# Patient Record
Sex: MALE | Race: WHITE | Employment: OTHER | URBAN - METROPOLITAN AREA
[De-identification: names, ages, dates, MRNs, and addresses within clinical notes are randomized per-mention and may not be internally consistent; named-entity substitution may affect disease eponyms.]

---

## 2022-10-13 ENCOUNTER — APPOINTMENT (OUTPATIENT)
Dept: GENERAL RADIOLOGY | Age: 62
DRG: 988 | End: 2022-10-13
Payer: COMMERCIAL

## 2022-10-13 ENCOUNTER — APPOINTMENT (OUTPATIENT)
Dept: CT IMAGING | Age: 62
DRG: 988 | End: 2022-10-13
Payer: COMMERCIAL

## 2022-10-13 ENCOUNTER — ANESTHESIA EVENT (OUTPATIENT)
Dept: OPERATING ROOM | Age: 62
DRG: 988 | End: 2022-10-13
Payer: COMMERCIAL

## 2022-10-13 ENCOUNTER — HOSPITAL ENCOUNTER (INPATIENT)
Age: 62
LOS: 3 days | Discharge: HOME | DRG: 988 | End: 2022-10-16
Attending: EMERGENCY MEDICINE | Admitting: STUDENT IN AN ORGANIZED HEALTH CARE EDUCATION/TRAINING PROGRAM
Payer: COMMERCIAL

## 2022-10-13 DIAGNOSIS — F10.10 ALCOHOL ABUSE: ICD-10-CM

## 2022-10-13 DIAGNOSIS — R55 SYNCOPE, UNSPECIFIED SYNCOPE TYPE: Primary | ICD-10-CM

## 2022-10-13 DIAGNOSIS — R29.6 FREQUENT FALLS: ICD-10-CM

## 2022-10-13 DIAGNOSIS — T79.6XXA TRAUMATIC RHABDOMYOLYSIS, INITIAL ENCOUNTER (HCC): ICD-10-CM

## 2022-10-13 DIAGNOSIS — S01.81XA FOREHEAD LACERATION, INITIAL ENCOUNTER: ICD-10-CM

## 2022-10-13 DIAGNOSIS — S01.311A COMPLEX LACERATION OF RIGHT EAR, INITIAL ENCOUNTER: ICD-10-CM

## 2022-10-13 DIAGNOSIS — R79.89 ELEVATED LFTS: ICD-10-CM

## 2022-10-13 DIAGNOSIS — I71.43 INFRARENAL ABDOMINAL AORTIC ANEURYSM (AAA) WITHOUT RUPTURE: ICD-10-CM

## 2022-10-13 LAB
A/G RATIO: 1.7 (ref 1.1–2.2)
ALBUMIN SERPL-MCNC: 4.5 G/DL (ref 3.4–5)
ALP BLD-CCNC: 83 U/L (ref 40–129)
ALT SERPL-CCNC: 86 U/L (ref 10–40)
AMMONIA: 31 UMOL/L (ref 16–60)
AMPHETAMINE SCREEN, URINE: NORMAL
ANION GAP SERPL CALCULATED.3IONS-SCNC: 17 MMOL/L (ref 3–16)
AST SERPL-CCNC: 244 U/L (ref 15–37)
BACTERIA: NORMAL /HPF
BARBITURATE SCREEN URINE: NORMAL
BASOPHILS ABSOLUTE: 0 K/UL (ref 0–0.2)
BASOPHILS RELATIVE PERCENT: 0.3 %
BENZODIAZEPINE SCREEN, URINE: NORMAL
BILIRUB SERPL-MCNC: 1.5 MG/DL (ref 0–1)
BILIRUB SERPL-MCNC: 1.8 MG/DL (ref 0–1)
BILIRUBIN DIRECT: 0.4 MG/DL (ref 0–0.3)
BILIRUBIN URINE: NEGATIVE
BILIRUBIN, INDIRECT: 1.1 MG/DL (ref 0–1)
BLOOD, URINE: ABNORMAL
BUN BLDV-MCNC: 10 MG/DL (ref 7–20)
CALCIUM SERPL-MCNC: 9.7 MG/DL (ref 8.3–10.6)
CANNABINOID SCREEN URINE: NORMAL
CHLORIDE BLD-SCNC: 95 MMOL/L (ref 99–110)
CLARITY: CLEAR
CO2: 22 MMOL/L (ref 21–32)
COCAINE METABOLITE SCREEN URINE: NORMAL
COLOR: YELLOW
COMMENT UA: NORMAL
CREAT SERPL-MCNC: 0.6 MG/DL (ref 0.8–1.3)
EOSINOPHILS ABSOLUTE: 0 K/UL (ref 0–0.6)
EOSINOPHILS RELATIVE PERCENT: 0.2 %
EPITHELIAL CELLS, UA: 0 /HPF (ref 0–5)
ETHANOL: NORMAL MG/DL (ref 0–0.08)
FENTANYL SCREEN, URINE: NORMAL
GFR AFRICAN AMERICAN: >60
GFR NON-AFRICAN AMERICAN: >60
GLUCOSE BLD-MCNC: 121 MG/DL (ref 70–99)
GLUCOSE URINE: NEGATIVE MG/DL
HCT VFR BLD CALC: 47.3 % (ref 40.5–52.5)
HEMOGLOBIN: 16.6 G/DL (ref 13.5–17.5)
HYALINE CASTS: 0 /LPF (ref 0–8)
KETONES, URINE: NEGATIVE MG/DL
LACTIC ACID: 4.2 MMOL/L (ref 0.4–2)
LEUKOCYTE ESTERASE, URINE: NEGATIVE
LYMPHOCYTES ABSOLUTE: 0.5 K/UL (ref 1–5.1)
LYMPHOCYTES RELATIVE PERCENT: 3.6 %
Lab: NORMAL
MAGNESIUM: 1.9 MG/DL (ref 1.8–2.4)
MCH RBC QN AUTO: 33.3 PG (ref 26–34)
MCHC RBC AUTO-ENTMCNC: 35.2 G/DL (ref 31–36)
MCV RBC AUTO: 94.5 FL (ref 80–100)
METHADONE SCREEN, URINE: NORMAL
MICROSCOPIC EXAMINATION: YES
MONOCYTES ABSOLUTE: 1.2 K/UL (ref 0–1.3)
MONOCYTES RELATIVE PERCENT: 9.5 %
NEUTROPHILS ABSOLUTE: 11.2 K/UL (ref 1.7–7.7)
NEUTROPHILS RELATIVE PERCENT: 86.4 %
NITRITE, URINE: NEGATIVE
OPIATE SCREEN URINE: NORMAL
OXYCODONE URINE: NORMAL
PDW BLD-RTO: 14 % (ref 12.4–15.4)
PH UA: 7
PH UA: 7 (ref 5–8)
PHENCYCLIDINE SCREEN URINE: NORMAL
PLATELET # BLD: 132 K/UL (ref 135–450)
PMV BLD AUTO: 7.3 FL (ref 5–10.5)
POTASSIUM REFLEX MAGNESIUM: 4.2 MMOL/L (ref 3.5–5.1)
PRO-BNP: 202 PG/ML (ref 0–124)
PROTEIN UA: NEGATIVE MG/DL
RBC # BLD: 5 M/UL (ref 4.2–5.9)
RBC UA: NORMAL /HPF (ref 0–4)
SODIUM BLD-SCNC: 134 MMOL/L (ref 136–145)
SPECIFIC GRAVITY UA: 1.01 (ref 1–1.03)
TOTAL CK: 6707 U/L (ref 39–308)
TOTAL PROTEIN: 7.2 G/DL (ref 6.4–8.2)
TROPONIN: <0.01 NG/ML
URINE REFLEX TO CULTURE: ABNORMAL
URINE TYPE: ABNORMAL
UROBILINOGEN, URINE: 1 E.U./DL
WBC # BLD: 13 K/UL (ref 4–11)
WBC UA: 0 /HPF (ref 0–5)

## 2022-10-13 PROCEDURE — 36415 COLL VENOUS BLD VENIPUNCTURE: CPT

## 2022-10-13 PROCEDURE — 99221 1ST HOSP IP/OBS SF/LOW 40: CPT | Performed by: OTOLARYNGOLOGY

## 2022-10-13 PROCEDURE — 83735 ASSAY OF MAGNESIUM: CPT

## 2022-10-13 PROCEDURE — 6360000004 HC RX CONTRAST MEDICATION: Performed by: PHYSICIAN ASSISTANT

## 2022-10-13 PROCEDURE — 93005 ELECTROCARDIOGRAM TRACING: CPT | Performed by: PHYSICIAN ASSISTANT

## 2022-10-13 PROCEDURE — 6360000002 HC RX W HCPCS: Performed by: PHYSICIAN ASSISTANT

## 2022-10-13 PROCEDURE — 70450 CT HEAD/BRAIN W/O DYE: CPT

## 2022-10-13 PROCEDURE — 80053 COMPREHEN METABOLIC PANEL: CPT

## 2022-10-13 PROCEDURE — 70486 CT MAXILLOFACIAL W/O DYE: CPT

## 2022-10-13 PROCEDURE — 82077 ASSAY SPEC XCP UR&BREATH IA: CPT

## 2022-10-13 PROCEDURE — 99285 EMERGENCY DEPT VISIT HI MDM: CPT

## 2022-10-13 PROCEDURE — 71045 X-RAY EXAM CHEST 1 VIEW: CPT

## 2022-10-13 PROCEDURE — 2580000003 HC RX 258: Performed by: STUDENT IN AN ORGANIZED HEALTH CARE EDUCATION/TRAINING PROGRAM

## 2022-10-13 PROCEDURE — 82140 ASSAY OF AMMONIA: CPT

## 2022-10-13 PROCEDURE — 90471 IMMUNIZATION ADMIN: CPT | Performed by: PHYSICIAN ASSISTANT

## 2022-10-13 PROCEDURE — 82550 ASSAY OF CK (CPK): CPT

## 2022-10-13 PROCEDURE — 90714 TD VACC NO PRESV 7 YRS+ IM: CPT | Performed by: PHYSICIAN ASSISTANT

## 2022-10-13 PROCEDURE — 96360 HYDRATION IV INFUSION INIT: CPT

## 2022-10-13 PROCEDURE — 72125 CT NECK SPINE W/O DYE: CPT

## 2022-10-13 PROCEDURE — 2580000003 HC RX 258: Performed by: INTERNAL MEDICINE

## 2022-10-13 PROCEDURE — 71260 CT THORAX DX C+: CPT

## 2022-10-13 PROCEDURE — 83605 ASSAY OF LACTIC ACID: CPT

## 2022-10-13 PROCEDURE — 1200000000 HC SEMI PRIVATE

## 2022-10-13 PROCEDURE — 96365 THER/PROPH/DIAG IV INF INIT: CPT

## 2022-10-13 PROCEDURE — 96361 HYDRATE IV INFUSION ADD-ON: CPT

## 2022-10-13 PROCEDURE — 2580000003 HC RX 258: Performed by: EMERGENCY MEDICINE

## 2022-10-13 PROCEDURE — 85025 COMPLETE CBC W/AUTO DIFF WBC: CPT

## 2022-10-13 PROCEDURE — 82248 BILIRUBIN DIRECT: CPT

## 2022-10-13 PROCEDURE — 96375 TX/PRO/DX INJ NEW DRUG ADDON: CPT

## 2022-10-13 PROCEDURE — 84484 ASSAY OF TROPONIN QUANT: CPT

## 2022-10-13 PROCEDURE — 80307 DRUG TEST PRSMV CHEM ANLYZR: CPT

## 2022-10-13 PROCEDURE — 96366 THER/PROPH/DIAG IV INF ADDON: CPT

## 2022-10-13 PROCEDURE — 2500000003 HC RX 250 WO HCPCS: Performed by: STUDENT IN AN ORGANIZED HEALTH CARE EDUCATION/TRAINING PROGRAM

## 2022-10-13 PROCEDURE — 6370000000 HC RX 637 (ALT 250 FOR IP): Performed by: PHYSICIAN ASSISTANT

## 2022-10-13 PROCEDURE — 6360000002 HC RX W HCPCS: Performed by: INTERNAL MEDICINE

## 2022-10-13 PROCEDURE — 83880 ASSAY OF NATRIURETIC PEPTIDE: CPT

## 2022-10-13 PROCEDURE — 81001 URINALYSIS AUTO W/SCOPE: CPT

## 2022-10-13 PROCEDURE — 2580000003 HC RX 258: Performed by: PHYSICIAN ASSISTANT

## 2022-10-13 RX ORDER — 0.9 % SODIUM CHLORIDE 0.9 %
1000 INTRAVENOUS SOLUTION INTRAVENOUS ONCE
Status: COMPLETED | OUTPATIENT
Start: 2022-10-13 | End: 2022-10-13

## 2022-10-13 RX ORDER — POLYETHYLENE GLYCOL 3350 17 G/17G
17 POWDER, FOR SOLUTION ORAL DAILY PRN
Status: DISCONTINUED | OUTPATIENT
Start: 2022-10-13 | End: 2022-10-16 | Stop reason: HOSPADM

## 2022-10-13 RX ORDER — SODIUM CHLORIDE, SODIUM LACTATE, POTASSIUM CHLORIDE, CALCIUM CHLORIDE 600; 310; 30; 20 MG/100ML; MG/100ML; MG/100ML; MG/100ML
INJECTION, SOLUTION INTRAVENOUS CONTINUOUS
Status: DISCONTINUED | OUTPATIENT
Start: 2022-10-13 | End: 2022-10-16

## 2022-10-13 RX ORDER — SODIUM CHLORIDE 0.9 % (FLUSH) 0.9 %
5-40 SYRINGE (ML) INJECTION PRN
Status: DISCONTINUED | OUTPATIENT
Start: 2022-10-13 | End: 2022-10-16 | Stop reason: HOSPADM

## 2022-10-13 RX ORDER — NICOTINE 21 MG/24HR
1 PATCH, TRANSDERMAL 24 HOURS TRANSDERMAL DAILY
Status: DISCONTINUED | OUTPATIENT
Start: 2022-10-13 | End: 2022-10-16 | Stop reason: HOSPADM

## 2022-10-13 RX ORDER — LORAZEPAM 2 MG/ML
1 INJECTION INTRAMUSCULAR ONCE
Status: COMPLETED | OUTPATIENT
Start: 2022-10-13 | End: 2022-10-13

## 2022-10-13 RX ORDER — SODIUM CHLORIDE 9 MG/ML
30 INJECTION, SOLUTION INTRAVENOUS ONCE
Status: COMPLETED | OUTPATIENT
Start: 2022-10-13 | End: 2022-10-13

## 2022-10-13 RX ORDER — METRONIDAZOLE 500 MG/100ML
500 INJECTION, SOLUTION INTRAVENOUS EVERY 8 HOURS
Status: DISCONTINUED | OUTPATIENT
Start: 2022-10-13 | End: 2022-10-16 | Stop reason: HOSPADM

## 2022-10-13 RX ORDER — 0.9 % SODIUM CHLORIDE 0.9 %
1000 INTRAVENOUS SOLUTION INTRAVENOUS ONCE
Status: DISCONTINUED | OUTPATIENT
Start: 2022-10-13 | End: 2022-10-13

## 2022-10-13 RX ORDER — SODIUM CHLORIDE 9 MG/ML
INJECTION, SOLUTION INTRAVENOUS PRN
Status: DISCONTINUED | OUTPATIENT
Start: 2022-10-13 | End: 2022-10-16 | Stop reason: HOSPADM

## 2022-10-13 RX ORDER — ACETAMINOPHEN 650 MG/1
650 SUPPOSITORY RECTAL EVERY 6 HOURS PRN
Status: DISCONTINUED | OUTPATIENT
Start: 2022-10-13 | End: 2022-10-16 | Stop reason: HOSPADM

## 2022-10-13 RX ORDER — BACITRACIN, NEOMYCIN, POLYMYXIN B 400; 3.5; 5 [USP'U]/G; MG/G; [USP'U]/G
OINTMENT TOPICAL ONCE
Status: COMPLETED | OUTPATIENT
Start: 2022-10-13 | End: 2022-10-13

## 2022-10-13 RX ORDER — GAUZE BANDAGE 2" X 2"
100 BANDAGE TOPICAL DAILY
Status: DISCONTINUED | OUTPATIENT
Start: 2022-10-13 | End: 2022-10-16 | Stop reason: HOSPADM

## 2022-10-13 RX ORDER — ONDANSETRON 4 MG/1
4 TABLET, ORALLY DISINTEGRATING ORAL EVERY 8 HOURS PRN
Status: DISCONTINUED | OUTPATIENT
Start: 2022-10-13 | End: 2022-10-14

## 2022-10-13 RX ORDER — ACETAMINOPHEN 325 MG/1
650 TABLET ORAL EVERY 6 HOURS PRN
Status: DISCONTINUED | OUTPATIENT
Start: 2022-10-13 | End: 2022-10-16 | Stop reason: HOSPADM

## 2022-10-13 RX ORDER — SODIUM CHLORIDE 0.9 % (FLUSH) 0.9 %
5-40 SYRINGE (ML) INJECTION EVERY 12 HOURS SCHEDULED
Status: DISCONTINUED | OUTPATIENT
Start: 2022-10-13 | End: 2022-10-16 | Stop reason: HOSPADM

## 2022-10-13 RX ORDER — ONDANSETRON 2 MG/ML
4 INJECTION INTRAMUSCULAR; INTRAVENOUS EVERY 6 HOURS PRN
Status: DISCONTINUED | OUTPATIENT
Start: 2022-10-13 | End: 2022-10-14

## 2022-10-13 RX ADMIN — SODIUM CHLORIDE 1000 ML: 9 INJECTION, SOLUTION INTRAVENOUS at 13:40

## 2022-10-13 RX ADMIN — CEFEPIME 2000 MG: 2 INJECTION, POWDER, FOR SOLUTION INTRAVENOUS at 15:30

## 2022-10-13 RX ADMIN — IOPAMIDOL 75 ML: 755 INJECTION, SOLUTION INTRAVENOUS at 14:15

## 2022-10-13 RX ADMIN — METRONIDAZOLE 500 MG: 500 INJECTION, SOLUTION INTRAVENOUS at 22:01

## 2022-10-13 RX ADMIN — Medication 100 MG: at 15:40

## 2022-10-13 RX ADMIN — CLOSTRIDIUM TETANI TOXOID ANTIGEN (FORMALDEHYDE INACTIVATED) AND CORYNEBACTERIUM DIPHTHERIAE TOXOID ANTIGEN (FORMALDEHYDE INACTIVATED) 0.5 ML: 5; 2 INJECTION, SUSPENSION INTRAMUSCULAR at 16:56

## 2022-10-13 RX ADMIN — LORAZEPAM 1 MG: 2 INJECTION INTRAMUSCULAR; INTRAVENOUS at 15:42

## 2022-10-13 RX ADMIN — SODIUM CHLORIDE 30 ML/KG/HR: 9 INJECTION, SOLUTION INTRAVENOUS at 15:47

## 2022-10-13 RX ADMIN — VANCOMYCIN HYDROCHLORIDE 1250 MG: 10 INJECTION, POWDER, LYOPHILIZED, FOR SOLUTION INTRAVENOUS at 23:03

## 2022-10-13 RX ADMIN — BACITRACIN ZINC, NEOMYCIN, POLYMYXIN B SULFAT: 5000; 3.5; 4 OINTMENT TOPICAL at 15:45

## 2022-10-13 RX ADMIN — SODIUM CHLORIDE, POTASSIUM CHLORIDE, SODIUM LACTATE AND CALCIUM CHLORIDE: 600; 310; 30; 20 INJECTION, SOLUTION INTRAVENOUS at 21:49

## 2022-10-13 RX ADMIN — SODIUM CHLORIDE 1000 ML: 9 INJECTION, SOLUTION INTRAVENOUS at 19:06

## 2022-10-13 ASSESSMENT — PAIN SCALES - GENERAL
PAINLEVEL_OUTOF10: 0

## 2022-10-13 ASSESSMENT — PAIN - FUNCTIONAL ASSESSMENT: PAIN_FUNCTIONAL_ASSESSMENT: 0-10

## 2022-10-13 NOTE — PROGRESS NOTES
Medication Reconciliation    List of medications patient is currently taking is complete. Source of information: 1. Conversation with patient at bedside                                      2. EPIC records      Allergies  Patient has no known allergies. Notes regarding home medications:   1.  Patient reports no regular prescription/OTC/herbal medications      Ishmael Gunn [facility]   10/13/2022  1:43 PM

## 2022-10-13 NOTE — CONSULTS
[facility]      Patient Name: Nanda Faulkner  Medical Record Number:  [id number]  Primary Care Physician:  Pcp Gladys  Date of Consultation: 10/13/2022    Chief Complaint: Facial trauma        HISTORY OF PRESENT ILLNESS  Lesa Rodrigues is a(n) 60 y.o. male who presents for evaluation of facial trauma. The patient says that he actually fell in the shower on Tuesday. He struck the right side of his face and had quite a bit of bleeding. He said that he does not have a good reason for why he waited until now to present to the emergency room. Minimal pain. Other than the obvious laceration of the right brow and right ear he does not think he hurt anything else. No changes in hearing. No changes in vision. No changes in occlusion. REVIEW OF SYSTEMS  As above    PHYSICAL EXAM  GENERAL: No Acute Distress, Alert and Oriented, no Hoarseness, strong voice  EYES: EOMI, Anti-icteric  HENT:   Head: Normocephalic and atraumatic. Face: Large deep laceration down to bone of the right frontal region extending through the right brow and into the right eyelid. I do not see any orbital fat. It does not seem to be involving the globe. He still able to raise the eyebrow and close his eye completely   Right Ear: Macerated right ear with exposed cartilage, small amount of purulent and serous fluid draining from the wound  Left Ear: Normal external ear, normal external auditory canal  Mouth/Oral Cavity:  normal lips, Uvula is midline, no mucosal lesions, no trismus  Oropharynx/Larynx:  normal oropharynx,  Nose:Normal external nasal appearance. NECK: Normal range of motion, no thyromegaly, trachea is midline, no lymphadenopathy, no neck masses, no crepitus      RADIOLOGY  Summary of findings:  I reviewed the CT scan of the facial bones.   I see no obvious evidence of a fracture            ASSESSMENT/PLAN  Right ear and right facial laceration-patient has a fairly severe right facial and right ear laceration. Ordinarily we try to wash this out and close them as soon as possible, but has already been 48 hours. I think that admitting him for IV antibiotics for 24 hours prior to washout and closure would be best.  I discussed the risk of the closure of the ear and the brow. I think that it is fairly high risk for infection given the delayed  closure. Also told him I think there is going to be a significant cosmetic defect of the right ear as there appears to be dead tissue. I do still think that washing it out and closing it the best we can as ideal for the healing process. Patient agrees with the plan.    -N.p.o. at midnight  -Recommend broad-spectrum IV antibiotics with vancomycin and Zosyn.  -Bacitracin to open wounds. I have performed a head and neck physical exam personally or was physically present during the key or critical portions of the service. This note was generated completely or in part utilizing Dragon dictation speech recognition software. Occasionally, words are mistranscribed and despite editing, the text may contain inaccuracies due to incorrect word recognition. If further clarification is needed please contact the office at [phone].

## 2022-10-13 NOTE — ED PROVIDER NOTES
[address]      Pt Name: Abigail Ramsey  MRN: [id number]  [place] 1960  Date of evaluation: 10/13/2022  Provider: Oh AmayaWalls , [address] [place] [address]       Chief Complaint   Patient presents with    Fall     Pt felt dizzy in shower and fell the right ear is cut in half and dangling x2 days ago. The right eyebrow is lacerated. Pt states they are drinking before bed to help them sleep. 7-8 drinks. Denies pain. States they are light headed. Pt thinks they lost consciousness. Ear Laceration     Pt fell in shower and ear is cut open. Ear looks necrotic. Facial Laceration     Pt fell in shower and cut eyebrow open. HISTORY OF PRESENT ILLNESS   (Location/Symptom, Timing/Onset, Context/Setting, Quality, Duration, Modifying Factors, Severity)  Note limiting factors. Abigail Ramsey is a 60 y.o. male who presents to the emergency department with a complaint of blackout spells. The patient states that he has been having fainting episodes in which she wakes up on the ground over the last several weeks. He states that this last occurred 2 days ago on Tuesday. He states that he was in the bathtub and awakened and found that he had cut his right eyebrow and right ear. He does not remember feeling dizzy or lightheaded before he passed out. He denies any preceding dizziness, vision change, speech change, chest pain, shortness of breath, palpitations. He contacted his sister today and advised her that he needed to go to the hospital because of the laceration to his right ear. He is unsure of his last tetanus immunization. He denies any headache or neck pain. He denies any chest pain heaviness pressure shortness of breath diaphoresis or dyspnea on exertion. He denies any palpitations or arrhythmia. He denies any prior history of syncope. He denies any history of seizures. He does drink alcohol, 7 or 8 drinks daily, mostly in the evening.   He states that it helps him sleep. He last drank yesterday evening at 11 PM.  He denies any history of alcohol withdrawal.  He denies any associated alcoholic seizures. He denies any associated incontinence. He does complain of some bruising to his upper back from the fall. He denies any recent fever, chills, cough or cold symptoms. He denies any recent nausea vomiting or diarrhea. He denies any melena hematochezia. He denies any abdominal pain dysuria hematuria frequency urgency. He denies any history of drug use. He denies any history of hypertension, hyperlipidemia, diabetes, coronary artery disease, thromboembolic disease, arrhythmia, or cardiomyopathy. He smokes tobacco.    Nursing Notes were reviewed. HPI        REVIEW OF SYSTEMS    (2-9 systems for level 4, 10 or more for level 5)       Constitutional: Negative for fever or chills. HENT: Negative for rhinorrhea and sore throat. Eyes: Negative for redness or drainage. Respiratory: Negative for shortness of breath or dyspnea on exertion. Cardiovascular: Negative for chest pain. Gastrointestinal: Negative for abdominal pain. Negative for vomiting or diarrhea. Genitourinary: Negative for flank pain. Negative for dysuria. Negative for hematuria. Neurological: Negative for headache. Musculoskeletal:  Negative edema. Hematological: Negative for adenopathy. All systems are reviewed and are negative except for those listed above in the history of present illness and ROS. PAST MEDICAL HISTORY   History reviewed. No pertinent past medical history. SURGICAL HISTORY     History reviewed. No pertinent surgical history. CURRENT MEDICATIONS       There are no discharge medications for this patient. ALLERGIES     Patient has no known allergies. FAMILY HISTORY     History reviewed. No pertinent family history.        SOCIAL HISTORY       Social History     Socioeconomic History    Marital status: Single     Spouse name: None    Number of children: None    Years of education: None    Highest education level: None   Tobacco Use    Smoking status: Every Day     Types: Cigarettes    Smokeless tobacco: Never   Vaping Use    Vaping Use: Never used   Substance and Sexual Activity    Alcohol use: Yes     Alcohol/week: 77.0 standard drinks     Types: 42 Cans of beer, 35 Shots of liquor per week    Drug use: Never    Sexual activity: Not Currently       SCREENINGS    [place] Coma Scale  Eye Opening: Spontaneous  Best Verbal Response: Oriented  Best Motor Response: Obeys commands  [place] Coma Scale Score: 15        PHYSICAL EXAM    (up to 7 for level 4, 8 or more for level 5)     ED Triage Vitals [10/13/22 1118]   BP Temp Temp Source Heart Rate Resp SpO2 Height Weight   (!) 146/81 98.4 °F (36.9 °C) Oral (!) 127 18 97 % 5' 8\" (1.727 m) 181 lb 3.5 oz (82.2 kg)         Physical Exam   Constitutional: Awake and alert. Head: There is a large oblique laceration to the right mid eyebrow that extends onto the forehead. No orbital involvement. Normocephalic. No hemotympanum. No niño sign. Eyes: Pupils equal and reactive. Pupils 3 mm and reactive bilaterally. Extraocular muscles were intact. No entrapment. No impairment of gaze. No hyphema. Mild bruising noted to the right upper lid. Lash margins were intact. No photophobia. Conjunctiva normal.    HENT: No mid facial bony tenderness. Mandible nontender forage motion. No malocclusion. Oral mucosa moist.  Airway patent. Pharynx without erythema. Nares were clear. No intraoral or intranasal injury. There was a large irregular laceration to the right ear that transected the pinna in the upper one third of the pinna. Cartilage was fully exposed. There was a flap of skin from the rim of the pinna that was hanging. This was thin and appeared to be nonviable. No evidence of infection or necrosis. No erythema or soft tissue swelling. Neck:  Soft and supple. Nontender.   No point or axial tenderness. No pain with range of motion. Heart: Sinus tachycardia on the monitor. No murmur. Lungs:  Clear to auscultation. No wheezes, rales, or ronchi. No conversational dyspnea or accessory muscle use. Chest: Chest wall non-tender. No evidence of trauma. Abdomen:  Soft, nondistended, bowel sounds present. Nontender. No guarding rigidity or rebound. No masses. Musculoskeletal: Thoracic and lumbar spine were nontender. No point tenderness or step-off. Pelvis stable and nontender. Some old appearing bruises noted to the upper back bilaterally, brownish-red in appearance. No bony tenderness or step-off. This was in the bilateral suprascapular area. Extremities non-tender with full range of motion. Radial and dorsalis pedis pulses were intact. No calf tenderness erythema or edema. Neurological: Alert and oriented x 3. GCS 15. No dysarthria. No aphasia. No pronator drift. Speech clear. Cranial nerves II-XII intact. No facial droop. No acute focal motor or sensory deficits. Able to lift all extremities off the bed without difficulty. There is a mild resting tremor in the upper and lower extremities. No agitation. Skin: Skin is warm and dry. No rash. Lymphatic:  No lympadenopathy. Psychiatric: Normal mood and affect. Behavior is normal.  No hallucinations. He denies any suicidal or homicidal ideations. No auditory or visual hallucinations. No agitation. DIAGNOSTIC RESULTS     EKG: All EKG's are interpreted by the Emergency Department Physician who either signs or Co-signs this chart in the absence of a cardiologist.    Patrick tachycardia. Rate 105. WI interval 132 ms. QRS duration 126 ms. QTc 510 ms. R axis -51 degrees. Right bundle branch block. No ST elevation.     RADIOLOGY:   Non-plain film images such as CT, Ultrasound and MRI are read by the radiologist. Plain radiographic images are visualized and preliminarily interpreted by the emergency physician with the below findings:        Interpretation per the Radiologist below, if available at the time of this note:    CT CHEST ABDOMEN PELVIS W CONTRAST Additional Contrast? None   Final Result   No acute abnormalities identified in the chest, abdomen or pelvis. Fusiform aneurysmal dilation of the infrarenal abdominal aorta with eccentric   thrombus formation. The aneurysm measures approximately 3.7 cm in diameter. RECOMMENDATIONS:   For management of fusiform AAA:      3.5-3.9 cm AAA, recommend follow-up every 2 years. Note: Recommend Vascular consultation if a fusiform AAA enlarges by >0.5 cm   in 6 months or >1 cm in 1 year or for a saccular AAA of any size. References: Shamika Space Radiol 2013; 47(63):481-033; J Vasc Surg. 2018; 67:2-77         CT HEAD WO CONTRAST   Preliminary Result   No evidence of acute intracranial abnormality. CT CERVICAL SPINE WO CONTRAST   Final Result   No acute abnormality of the cervical spine. CT FACIAL BONES WO CONTRAST   Preliminary Result   1. No radiographic finding to suggest presence of acute facial bone fracture. 2. Findings suggestive of presence of multiple dental caries. Abnormal   lucency surrounding multiple teeth particularly the maxillary teeth may be on   the basis of underlying periodontal disease as described above. XR CHEST PORTABLE   Final Result   Minimal scarring/atelectatic changes seen in the lateral aspect of the left   lower lung field. No confluent airspace opacity seen.          XR ELBOW LEFT (MIN 3 VIEWS)    (Results Pending)   XR ELBOW RIGHT (MIN 3 VIEWS)    (Results Pending)         ED BEDSIDE ULTRASOUND:   Performed by ED Physician - none    LABS:  Labs Reviewed   CBC WITH AUTO DIFFERENTIAL - Abnormal; Notable for the following components:       Result Value    WBC 13.0 (*)     Platelets 188 (*)     Neutrophils Absolute 11.2 (*)     Lymphocytes Absolute 0.5 (*)     All other components within normal limits   COMPREHENSIVE METABOLIC PANEL W/ REFLEX TO MG FOR LOW K - Abnormal; Notable for the following components:    Sodium 134 (*)     Chloride 95 (*)     Anion Gap 17 (*)     Glucose 121 (*)     Creatinine 0.6 (*)     Total Bilirubin 1.8 (*)     ALT 86 (*)      (*)     All other components within normal limits   URINALYSIS WITH REFLEX TO CULTURE - Abnormal; Notable for the following components:    Blood, Urine SMALL (*)     All other components within normal limits   LACTIC ACID - Abnormal; Notable for the following components:    Lactic Acid 4.2 (*)     All other components within normal limits    Narrative:     Ashok Mena tel. [phone],  Chemistry results called to and read back by Eri Polanco RN, 10/13/2022  13:20, by [address] [place] [address] - Abnormal; Notable for the following components:    Pro- (*)     All other components within normal limits   BILIRUBIN TOTAL DIRECT & INDIRECT - Abnormal; Notable for the following components: Total Bilirubin 1.5 (*)     Bilirubin, Direct 0.4 (*)     Bilirubin, Indirect 1.1 (*)     All other components within normal limits   CK - Abnormal; Notable for the following components: Total CK 6,707 (*)     All other components within normal limits   TROPONIN   ETHANOL   URINE DRUG SCREEN   AMMONIA   MAGNESIUM   MICROSCOPIC URINALYSIS   BASIC METABOLIC PANEL W/ REFLEX TO MG FOR LOW K   CBC WITH AUTO DIFFERENTIAL   CK       All other labs were within normal range or not returned as of this dictation.     EMERGENCY DEPARTMENT COURSE and DIFFERENTIAL DIAGNOSIS/MDM:   Vitals:    Vitals:    10/13/22 2030 10/13/22 2100 10/13/22 2104 10/13/22 2122   BP: 129/84 115/72  129/79   Pulse: (!) 104 100 93 99   Resp: 20 27 23 18   Temp:    98.4 °F (36.9 °C)   TempSrc:    Oral   SpO2:   99% 96%   Weight:       Height:             MDM        The patient presents to the emergency department with a complaint of blackout spells that have been occurring multiple times over the last few weeks. The last occurred on Tuesday. Patient has no recall of falling and no preceding presyncopal symptoms. Differential diagnosis would include seizure, syncope, simple fainting spell, alcohol intoxication. It appears that he does drink heavily. The patient does have a mild alcohol withdrawal.  He is mildly tachycardic with a heart rate of 115 and there is a mild resting tremor. However, there is no agitation or hallucinations. He was given Ativan 1 mg IV. The patient has large irregular laceration to the right ear that transects the pinna with full exposure of the cartilage. Wound care was provided. Wound was cleansed with chlorhexidine and irrigated with normal saline. Sterile dressing was applied. There is also a laceration to the right eyebrow as noted above. Tetanus immunization was updated. The patient was given cefepime 2 g IV. Please refer to the photographs documented in the physician assistant's chart regarding lacerations to the right ear and right eyebrow. Care was discussed by the physician assistant with Dr. Abdelrahman Navarro who is on-call for ENT. Patient's wounds are now greater than 50 hours old. They were unable to be repaired here in the emergency department. The patient may require surgical debridement. Further management per Dr. Abdelrahman Navarro. REASSESSMENT          CT head, cervical spine, and maxillofacial are negative. CT chest abdomen pelvis was added. White blood cell count is 13.0. Glucose 121. Bicarb 22. Creatinine 0.6. Troponin is normal.  Serum alcohol is 0. Lactate is elevated at 4.2. Patient is afebrile. Clinical suspicion for sepsis is very low. Fluid bolus of normal saline 30 mL/kg was given. He will also be placed on CIWA protocol. He will be admitted for further treatment and evaluation. A call was placed to the hospitalist on-call for admission. CK is elevated consistent with rhabdomyolysis.   Renal function is normal.  IV fluids will be continued. CRITICAL CARE TIME   Total Critical Care time was 20 minutes, excluding separately reportable procedures. There was a high probability of clinically significant/life threatening deterioration in the patient's condition which required my urgent intervention. CONSULTS:  IP CONSULT TO OTOLARYNGOLOGY  PHARMACY TO DOSE VANCOMYCIN    PROCEDURES:  Unless otherwise noted below, none     Procedures        FINAL IMPRESSION      1. Syncope, unspecified syncope type    2. Complex laceration of right ear, initial encounter    3. Forehead laceration, initial encounter    4. Alcohol abuse    5. Elevated LFTs    6. Infrarenal abdominal aortic aneurysm (AAA) without rupture    7. Traumatic rhabdomyolysis, initial encounter [facility])          DISPOSITION/PLAN   DISPOSITION Admitted 10/13/2022 06:09:04 PM      PATIENT REFERRED TO:  No follow-up provider specified. DISCHARGE MEDICATIONS:  There are no discharge medications for this patient. Controlled Substances Monitoring:     No flowsheet data found. (Please note that portions of this note were completed with a voice recognition program.  Efforts were made to edit the dictations but occasionally words are mis-transcribed. )    1859 Jose Luis Werner DO (electronically signed)  Attending Emergency Physician           Marielos Ibrahim DO  10/13/22 6954

## 2022-10-13 NOTE — H&P
Hospital Medicine History & Physical      PCP: Pcp No    Date of Admission: 10/13/2022    Date of Service: 10/13/2022    Pt seen/examined on 10/13/2022    Admitted to Inpatient with expected LOS greater than two midnights due to medical therapy. Chief Complaint:     Chief Complaint   Patient presents with    Fall     Pt felt dizzy in shower and fell the right ear is cut in half and dangling x2 days ago. The right eyebrow is lacerated. Pt states they are drinking before bed to help them sleep. 7-8 drinks. Denies pain. States they are light headed. Pt thinks they lost consciousness. Ear Laceration     Pt fell in shower and ear is cut open. Ear looks necrotic. Facial Laceration     Pt fell in shower and cut eyebrow open. History Of Present Illness:      60 y.o. male with a history of multiple medical problems who presented to [facility] with falls. Has been drinking quite a bit of alcohol at night time. No hx of seizures. He's been passing out and falling down recently. Murl Rung in the showed 2 days ago and cut himself rather badly. Waited till now to come in to ED for evaluation. No other complaints. ENT consulted, will take to OR tomorrow to wash out right facial laceration and close. Past Medical History:      Reviewed and non-contributory except as noted below  No past medical history on file. Past Surgical History:      Reviewed and non-contributory except as noted below  No past surgical history on file. Medications Prior to Admission:      Reviewed and non-contributory except as noted below  Prior to Admission medications    Not on File       Allergies:     Reviewed and non-contributory except as noted below   Patient has no known allergies. Social History:      Reviewed and non-contributory except as noted below    TOBACCO:   has no history on file for tobacco use. ETOH:   has no history on file for alcohol use.     Family History:      Reviewed and non-contributory except as noted below    No family history on file. REVIEW OF SYSTEMS:   Pertinent positives and negatives as noted in the HPI. All other systems reviewed and negative. PHYSICAL EXAM PERFORMED:    /82   Pulse (!) 112   Temp 97.8 °F (36.6 °C) (Oral)   Resp 28   Ht 5' 8\" (1.727 m)   Wt 181 lb 3.5 oz (82.2 kg)   SpO2 97%   BMI 27.55 kg/m²     General appearance: No apparent distress, appears stated age and cooperative. HEENT: Pupils equal, round, and reactive to light. Conjunctivae/corneas clear. Face: Deep laceration in R frontal region. Macerated right ear with purulent drainage. Neck: Supple, with full range of motion. No jugular venous distention. Trachea midline. Respiratory:  Normal respiratory effort. Clear to auscultation, bilaterally without Rales/Wheezes/Rhonchi. Cardiovascular: Regular rate and rhythm with normal S1/S2 without murmurs, rubs or gallops. Abdomen: Soft, non-tender, non-distended with normal bowel sounds. Musculoskeletal: No clubbing, cyanosis or edema bilaterally. Full range of motion without deformity. Skin: Skin color, texture, turgor normal.  No rashes or lesions. Neurologic:  Neurovascularly intact without any focal sensory/motor deficits. Cranial nerves: II-XII intact, grossly non-focal.  Psychiatric: Alert and oriented, thought content appropriate, normal insight    Labs:     Recent Labs     10/13/22  1241   WBC 13.0*   HGB 16.6   HCT 47.3   *     Recent Labs     10/13/22  1241   *   K 4.2   CL 95*   CO2 22   BUN 10   CREATININE 0.6*   CALCIUM 9.7     Recent Labs     10/13/22  1241 10/13/22  1516   *  --    ALT 86*  --    BILIDIR  --  0.4*   BILITOT 1.8* 1.5*   ALKPHOS 83  --      No results for input(s): INR in the last 72 hours.   Recent Labs     10/13/22  1241 10/13/22  1516   CKTOTAL  --  6,707*   TROPONINI <0.01  --        Urinalysis:      Lab Results   Component Value Date/Time    NITRU Negative 10/13/2022 03:16 PM    WBCUA 0 10/13/2022 03:16 PM    BACTERIA None Seen 10/13/2022 03:16 PM    RBCUA 0-2 10/13/2022 03:16 PM    BLOODU SMALL 10/13/2022 03:16 PM    SPECGRAV 1.006 10/13/2022 03:16 PM    GLUCOSEU Negative 10/13/2022 03:16 PM       Radiology:     CT CHEST ABDOMEN PELVIS W CONTRAST Additional Contrast? None   Final Result   No acute abnormalities identified in the chest, abdomen or pelvis. Fusiform aneurysmal dilation of the infrarenal abdominal aorta with eccentric   thrombus formation. The aneurysm measures approximately 3.7 cm in diameter. RECOMMENDATIONS:   For management of fusiform AAA:      3.5-3.9 cm AAA, recommend follow-up every 2 years. Note: Recommend Vascular consultation if a fusiform AAA enlarges by >0.5 cm   in 6 months or >1 cm in 1 year or for a saccular AAA of any size. References: Sera Graves Radiol 2013; 54(72):655-376; J Vasc Surg. 2018; 67:2-77         CT HEAD WO CONTRAST   Preliminary Result   No evidence of acute intracranial abnormality. CT CERVICAL SPINE WO CONTRAST   Final Result   No acute abnormality of the cervical spine. CT FACIAL BONES WO CONTRAST   Preliminary Result   1. No radiographic finding to suggest presence of acute facial bone fracture. 2. Findings suggestive of presence of multiple dental caries. Abnormal   lucency surrounding multiple teeth particularly the maxillary teeth may be on   the basis of underlying periodontal disease as described above. XR CHEST PORTABLE   Final Result   Minimal scarring/atelectatic changes seen in the lateral aspect of the left   lower lung field. No confluent airspace opacity seen.          XR ELBOW LEFT (MIN 3 VIEWS)    (Results Pending)   XR ELBOW RIGHT (MIN 3 VIEWS)    (Results Pending)       ASSESSMENT:    Active Hospital Problems    Diagnosis Date Noted    Frequent falls [R29.6] 10/13/2022     Priority: Medium       PLAN:    Recurrent falls likely 2/2 Alcohol abuse  - CIWA  - Thiamine  - IVF    Facial lacerations  - ENT consulted, to OR tomorrow  - Recommend IV abx with vanc and zosyn. D/t rhabdo and risk for IQRA, will start with vanc, cefepime, and flagyl  - Bacitracin ointment    Rhabdomyolysis  - CK 6700 on admit  - Daily CK  - LR @ 150 cc/h    Transaminitis  - With mild bilirubinemia, likely 2/2 alcohol abuse  - INR in AM  - Daily CMP    Lactic acidosis  - 4.2 on arrival  - Thiamine administration  - IVF as above    The patient and / or the family were informed of the results of any tests, a time was given to answer questions, a plan was proposed and they agreed with plan. DVT Prophylaxis: SCDs  Diet: No diet orders on file  Code Status: No Order    PT/OT Eval Status: Deferred    Dispo: Qiana Crane pending clinical improvement      Geoff Stack MD   Internal Medicine  10/13/2022 6:09 PM  Reach via [employer]      Thank you Pcp No for the opportunity to be involved in this patient's care. If you have any questions or concerns please feel free to contact me via [address] [place] [address] Avenue.

## 2022-10-13 NOTE — ED PROVIDER NOTES
[facility]        Pt Name: Sarah Mccoy  MRN: [id number]  [place] 1960  Date of evaluation: 10/13/2022  Provider: FRED Llamas  PCP: Pcp No  Note Started: 11:35 AM EDT     This patient was also seen and evaluated by the attending physician Anita Wright, [address] [place] [address]       Chief Complaint   Patient presents with    Fall     Pt felt dizzy in shower and fell the right ear is cut in half and dangling x2 days ago. The right eyebrow is lacerated. Pt states they are drinking before bed to help them sleep. 7-8 drinks. Denies pain. States they are light headed. Pt thinks they lost consciousness. Ear Laceration     Pt fell in shower and ear is cut open. Ear looks necrotic. Facial Laceration     Pt fell in shower and cut eyebrow open. HISTORY OF PRESENT ILLNESS   (Location, Timing/Onset, Context/Setting, Quality, Duration, Modifying Factors, Severity, Associated Signs and Symptoms)  Note limiting factors. Sarah Mccoy is a 60 y.o. male who presents wearing a fall in the shower. He says it happened 2 days ago, and he thinks he passed out, not sure how long he was on the floor, and says he realizes he probably should have gotten this checked out sooner but does not have a good explanation for why he waited. He says he has a big cut on his right ear, and another on the forehead. Says has been having episodes of passing out and falling a lot lately at home. Lives alone. Admits to drinking a lot of alcohol, mostly at night. Takes no prescription medications. Denies any other complaints, including shortness of breath, abdominal pain, chest pain. Says he has some bruises elsewhere but no significant pain. Nursing Notes were all reviewed and agreed with or any disagreements were addressed in the HPI.     REVIEW OF SYSTEMS    (2-9 systems for level 4, 10 or more for level 5)     Positives and pertinent negatives as per HPI. PAST MEDICAL HISTORY   No past medical history on file. SURGICAL HISTORY   No past surgical history on file. CURRENTMEDICATIONS       Previous Medications    No medications on file       ALLERGIES     Patient has no known allergies. FAMILYHISTORY     No family history on file. SOCIAL HISTORY          SCREENINGS    Danika Coma Scale  Eye Opening: Spontaneous  Best Verbal Response: Oriented  Best Motor Response: Obeys commands  Danika Coma Scale Score: 15      PHYSICAL EXAM    (up to 7 for level 4, 8 or more for level 5)     ED Triage Vitals [10/13/22 1118]   BP Temp Temp Source Heart Rate Resp SpO2 Height Weight   (!) 146/81 98.4 °F (36.9 °C) Oral (!) 127 18 97 % 5' 8\" (1.727 m) 181 lb 3.5 oz (82.2 kg)       Physical Exam  Vitals and nursing note reviewed. Constitutional:       General: He is not in acute distress. Appearance: Normal appearance. He is not ill-appearing. HENT:      Head: Normocephalic and atraumatic. Comments: There is a laceration noted to the right periorbital area superior and lateral to the eye, through the eyebrow and upper eyelid, with clotted blood and early stages of healing. Negative for significant bony tenderness in this area. There was also a complex laceration to the right ear all the way through the helix and antihelix, not involving the tragus or canal, with broad exposure of cartilage and soft tissue. See images below. Negative for raccoon eyes or Sloan's sign. Negative for mastoid tenderness bilaterally. Nose: Nose normal.   Eyes:      General:         Right eye: No discharge. Left eye: No discharge. Extraocular Movements: Extraocular movements intact. Pupils: Pupils are equal, round, and reactive to light. Cardiovascular:      Rate and Rhythm: Tachycardia present. Pulmonary:      Effort: Pulmonary effort is normal. No respiratory distress. Musculoskeletal:         General: Normal range of motion. Cervical back: Normal range of motion. Comments: Patient has some bruising on his elbows over the olecranon, and some ecchymosis on the back in various stages of healing. No direct spinal tenderness. Skin:     General: Skin is warm and dry. Neurological:      General: No focal deficit present. Mental Status: He is alert and oriented to person, place, and time. Psychiatric:         Mood and Affect: Mood normal.         Behavior: Behavior normal.                 DIAGNOSTIC RESULTS   LABS:    Labs Reviewed   CBC WITH AUTO DIFFERENTIAL - Abnormal; Notable for the following components:       Result Value    WBC 13.0 (*)     Platelets 668 (*)     Neutrophils Absolute 11.2 (*)     Lymphocytes Absolute 0.5 (*)     All other components within normal limits   COMPREHENSIVE METABOLIC PANEL W/ REFLEX TO MG FOR LOW K - Abnormal; Notable for the following components:    Sodium 134 (*)     Chloride 95 (*)     Anion Gap 17 (*)     Glucose 121 (*)     Creatinine 0.6 (*)     Total Bilirubin 1.8 (*)     ALT 86 (*)      (*)     All other components within normal limits   URINALYSIS WITH REFLEX TO CULTURE - Abnormal; Notable for the following components:    Blood, Urine SMALL (*)     All other components within normal limits   LACTIC ACID - Abnormal; Notable for the following components:    Lactic Acid 4.2 (*)     All other components within normal limits    Narrative:     Fer Ortiz tel. [phone],  Chemistry results called to and read back by Erik Anton RN, 10/13/2022  13:20, by Olinda Brumfield - Abnormal; Notable for the following components:    Pro- (*)     All other components within normal limits   BILIRUBIN TOTAL DIRECT & INDIRECT - Abnormal; Notable for the following components:     Total Bilirubin 1.5 (*)     Bilirubin, Direct 0.4 (*)     Bilirubin, Indirect 1.1 (*)     All other components within normal limits   CK - Abnormal; Notable for the following components: Total CK 6,707 (*)     All other components within normal limits   TROPONIN   ETHANOL   URINE DRUG SCREEN   AMMONIA   MAGNESIUM   MICROSCOPIC URINALYSIS       When ordered only abnormal lab results are displayed. All other labs were within normal range or not returned as of this dictation. EKG: When ordered, EKG's are interpreted by the Emergency Department Physician in the absence of a cardiologist.  Please see their note for interpretation of EKG. RADIOLOGY:   All images such as plain radiographs, CT, Ultrasound and MRI are interpreted by a radiologist. Some images are visualized and preliminarily interpreted by me and/or the ED attending physician. Interpretation per the radiologist below, if available at the time of this note:    CT CHEST ABDOMEN PELVIS W CONTRAST Additional Contrast? None   Final Result   No acute abnormalities identified in the chest, abdomen or pelvis. Fusiform aneurysmal dilation of the infrarenal abdominal aorta with eccentric   thrombus formation. The aneurysm measures approximately 3.7 cm in diameter. RECOMMENDATIONS:   For management of fusiform AAA:      3.5-3.9 cm AAA, recommend follow-up every 2 years. Note: Recommend Vascular consultation if a fusiform AAA enlarges by >0.5 cm   in 6 months or >1 cm in 1 year or for a saccular AAA of any size. References: Richards Maite Radiol 2013; 53(80):716-706; J Vasc Surg. 2018; 67:2-77         CT HEAD WO CONTRAST   Preliminary Result   No evidence of acute intracranial abnormality. CT CERVICAL SPINE WO CONTRAST   Final Result   No acute abnormality of the cervical spine. CT FACIAL BONES WO CONTRAST   Preliminary Result   1. No radiographic finding to suggest presence of acute facial bone fracture. 2. Findings suggestive of presence of multiple dental caries.   Abnormal   lucency surrounding multiple teeth particularly the maxillary teeth may be on   the basis of underlying periodontal disease as described above. XR CHEST PORTABLE   Final Result   Minimal scarring/atelectatic changes seen in the lateral aspect of the left   lower lung field. No confluent airspace opacity seen. XR ELBOW LEFT (MIN 3 VIEWS)    (Results Pending)   XR ELBOW RIGHT (MIN 3 VIEWS)    (Results Pending)       CONSULTS:  [facility]   Unless otherwise noted below, none. Procedures    EMERGENCY DEPARTMENT COURSE and DIFFERENTIAL DIAGNOSIS/MDM:   Vitals:    Vitals:    10/13/22 1558 10/13/22 1621 10/13/22 1623 10/13/22 1700   BP: 137/88 137/88 (!) 146/81 129/82   Pulse: (!) 101  (!) 127 (!) 112   Resp: 29   28   Temp: 97.8 °F (36.6 °C)      TempSrc: Oral      SpO2: 98%   97%   Weight:       Height:           Patient was given the following medications:  Medications   thiamine mononitrate tablet 100 mg (100 mg Oral Given 10/13/22 1540)   nicotine (NICODERM CQ) 21 MG/24HR 1 patch (1 patch TransDERmal Patch Applied 10/13/22 1539)   neomycin-bacitracin-polymyxin (NEOSPORIN) ointment (has no administration in time range)   0.9 % sodium chloride bolus (has no administration in time range)   tetanus & diphtheria toxoids (adult) 5-2 LFU injection 0.5 mL (0.5 mLs IntraMUSCular Given 10/13/22 1656)   LORazepam (ATIVAN) injection 1 mg (1 mg IntraVENous Given 10/13/22 1542)   cefepime (MAXIPIME) 2000 mg IVPB minibag (0 mg IntraVENous Stopped 10/13/22 1705)   iopamidol (ISOVUE-370) 76 % injection 75 mL (75 mLs IntraVENous Given 10/13/22 1415)   0.9 % sodium chloride infusion (30 mL/kg/hr × 82.2 kg IntraVENous New Bag 10/13/22 1547)           Is this patient to be included in the SEP-1 Core Measure due to severe sepsis or septic shock? No   Exclusion criteria - the patient is NOT to be included for SEP-1 Core Measure due to:  May have criteria for sepsis, but does not meet criteria for severe sepsis or septic shock    See physical exam above. Patient's wounds are now about 3days old.   They were cleaned in the ED but not appropriate for suturing. I consulted the ENT physician on-call, Dr. Jesenia Yepez, who visited the patient in the ED, says he will likely take the patient to the OR tomorrow for liver cleaning and closure as possible. Patient is alcoholic, says last drink was last night, and he was tachycardic and had a tremor in the ER, but CIWA score was 5. No seizure activity. He was given IV Ativan and seizure precautions were placed. Laboratory work-up was notable for CK greater than 6700. Lactate greater than 4, normal kidney function. Had some LFT and bilirubin elevation. He was given IV fluids and antibiotics in the emergency department. CT scans of the head, cervical spine, facial bones, chest, abdomen and pelvis all showed no acute findings. There was an incidental AAA. I consulted the hospitalist, who agreed to accept and admit the patient. The patient verbalized understanding and agreement with this plan of care. CRITICAL CARE TIME   There was a high probability of clinically significant and/or life threatening deterioration in this patient's condition which required my urgent intervention. I independently provided 20 minutes of non-concurrent critical care out of the total shared critical care time provided. This excludes any time for separately reportable procedures. FINAL IMPRESSION      1. Syncope, unspecified syncope type    2. Complex laceration of right ear, initial encounter    3. Forehead laceration, initial encounter    4. Alcohol abuse    5. Elevated LFTs    6. Infrarenal abdominal aortic aneurysm (AAA) without rupture    7. Traumatic rhabdomyolysis, initial encounter [facility])          DISPOSITION/PLAN   DISPOSITION Decision To Admit 10/13/2022 04:21:52 PM      PATIENT REFERRED TO:  No follow-up provider specified.     DISCHARGE MEDICATIONS:  New Prescriptions    No medications on file       DISCONTINUED MEDICATIONS:  Discontinued Medications    No medications on file (Please note that portions of this note were completed with a voice recognition program.  Efforts were made to edit the dictations but occasionally words are mis-transcribed.)    FRED Henderson (electronically signed)        Yana Henderson  10/13/22 [phone]

## 2022-10-13 NOTE — ED NOTES
Pt. to be admitted to [place] [address] room 4281. Report called to Fransisca Kent RN who will in turn give report to night shift nurse. Transport will be initiated when room has been cleaned.       Kaila Nuñez RN  10/13/22 8807

## 2022-10-13 NOTE — ED NOTES
1st attempt to call report to 4N. No answer from nurse. Will attempt again.       Sienna Bob RN  10/13/22 8885

## 2022-10-14 ENCOUNTER — ANESTHESIA (OUTPATIENT)
Dept: OPERATING ROOM | Age: 62
DRG: 988 | End: 2022-10-14
Payer: COMMERCIAL
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
## 2022-10-14 LAB
ANION GAP SERPL CALCULATED.3IONS-SCNC: 10 MMOL/L (ref 3–16)
BASOPHILS ABSOLUTE: 0.1 K/UL (ref 0–0.2)
BASOPHILS RELATIVE PERCENT: 0.6 %
BUN BLDV-MCNC: 9 MG/DL (ref 7–20)
CALCIUM SERPL-MCNC: 8.4 MG/DL (ref 8.3–10.6)
CHLORIDE BLD-SCNC: 107 MMOL/L (ref 99–110)
CO2: 23 MMOL/L (ref 21–32)
CREAT SERPL-MCNC: 0.5 MG/DL (ref 0.8–1.3)
EKG ATRIAL RATE: 105 BPM
EKG DIAGNOSIS: NORMAL
EKG P AXIS: 45 DEGREES
EKG P-R INTERVAL: 132 MS
EKG Q-T INTERVAL: 386 MS
EKG QRS DURATION: 126 MS
EKG QTC CALCULATION (BAZETT): 510 MS
EKG R AXIS: -51 DEGREES
EKG T AXIS: 4 DEGREES
EKG VENTRICULAR RATE: 105 BPM
EOSINOPHILS ABSOLUTE: 0 K/UL (ref 0–0.6)
EOSINOPHILS RELATIVE PERCENT: 0.2 %
GFR AFRICAN AMERICAN: >60
GFR NON-AFRICAN AMERICAN: >60
GLUCOSE BLD-MCNC: 100 MG/DL (ref 70–99)
HCT VFR BLD CALC: 40.1 % (ref 40.5–52.5)
HEMATOLOGY PATH CONSULT: NO
HEMOGLOBIN: 14 G/DL (ref 13.5–17.5)
LYMPHOCYTES ABSOLUTE: 0.9 K/UL (ref 1–5.1)
LYMPHOCYTES RELATIVE PERCENT: 10.7 %
MCH RBC QN AUTO: 33.6 PG (ref 26–34)
MCHC RBC AUTO-ENTMCNC: 35 G/DL (ref 31–36)
MCV RBC AUTO: 95.9 FL (ref 80–100)
MONOCYTES ABSOLUTE: 0.8 K/UL (ref 0–1.3)
MONOCYTES RELATIVE PERCENT: 9.6 %
NEUTROPHILS ABSOLUTE: 6.9 K/UL (ref 1.7–7.7)
NEUTROPHILS RELATIVE PERCENT: 78.9 %
PDW BLD-RTO: 13.5 % (ref 12.4–15.4)
PLATELET # BLD: 98 K/UL (ref 135–450)
PLATELET SLIDE REVIEW: ABNORMAL
PMV BLD AUTO: 7.1 FL (ref 5–10.5)
POTASSIUM REFLEX MAGNESIUM: 3.7 MMOL/L (ref 3.5–5.1)
RBC # BLD: 4.18 M/UL (ref 4.2–5.9)
RBC # BLD: NORMAL 10*6/UL
SLIDE REVIEW: ABNORMAL
SODIUM BLD-SCNC: 140 MMOL/L (ref 136–145)
TOTAL CK: 4287 U/L (ref 39–308)
VANCOMYCIN RANDOM: 8.1 UG/ML
WBC # BLD: 8.7 K/UL (ref 4–11)

## 2022-10-14 PROCEDURE — 7100000000 HC PACU RECOVERY - FIRST 15 MIN: Performed by: OTOLARYNGOLOGY

## 2022-10-14 PROCEDURE — 1200000000 HC SEMI PRIVATE

## 2022-10-14 PROCEDURE — 36415 COLL VENOUS BLD VENIPUNCTURE: CPT

## 2022-10-14 PROCEDURE — 0KQ00ZZ REPAIR HEAD MUSCLE, OPEN APPROACH: ICD-10-PCS | Performed by: OTOLARYNGOLOGY

## 2022-10-14 PROCEDURE — 6370000000 HC RX 637 (ALT 250 FOR IP): Performed by: OTOLARYNGOLOGY

## 2022-10-14 PROCEDURE — 0HB1XZZ EXCISION OF FACE SKIN, EXTERNAL APPROACH: ICD-10-PCS | Performed by: OTOLARYNGOLOGY

## 2022-10-14 PROCEDURE — 82550 ASSAY OF CK (CPK): CPT

## 2022-10-14 PROCEDURE — 2500000003 HC RX 250 WO HCPCS: Performed by: STUDENT IN AN ORGANIZED HEALTH CARE EDUCATION/TRAINING PROGRAM

## 2022-10-14 PROCEDURE — 85025 COMPLETE CBC W/AUTO DIFF WBC: CPT

## 2022-10-14 PROCEDURE — A4217 STERILE WATER/SALINE, 500 ML: HCPCS | Performed by: OTOLARYNGOLOGY

## 2022-10-14 PROCEDURE — 6370000000 HC RX 637 (ALT 250 FOR IP): Performed by: NURSE PRACTITIONER

## 2022-10-14 PROCEDURE — 97161 PT EVAL LOW COMPLEX 20 MIN: CPT

## 2022-10-14 PROCEDURE — 97530 THERAPEUTIC ACTIVITIES: CPT

## 2022-10-14 PROCEDURE — 2500000003 HC RX 250 WO HCPCS

## 2022-10-14 PROCEDURE — 80202 ASSAY OF VANCOMYCIN: CPT

## 2022-10-14 PROCEDURE — 6360000002 HC RX W HCPCS: Performed by: NURSE PRACTITIONER

## 2022-10-14 PROCEDURE — 6360000002 HC RX W HCPCS

## 2022-10-14 PROCEDURE — 3600000003 HC SURGERY LEVEL 3 BASE: Performed by: OTOLARYNGOLOGY

## 2022-10-14 PROCEDURE — 80048 BASIC METABOLIC PNL TOTAL CA: CPT

## 2022-10-14 PROCEDURE — 97166 OT EVAL MOD COMPLEX 45 MIN: CPT

## 2022-10-14 PROCEDURE — 3600000013 HC SURGERY LEVEL 3 ADDTL 15MIN: Performed by: OTOLARYNGOLOGY

## 2022-10-14 PROCEDURE — 3700000001 HC ADD 15 MINUTES (ANESTHESIA): Performed by: OTOLARYNGOLOGY

## 2022-10-14 PROCEDURE — 2709999900 HC NON-CHARGEABLE SUPPLY: Performed by: OTOLARYNGOLOGY

## 2022-10-14 PROCEDURE — 13152 CMPLX RPR E/N/E/L 2.6-7.5 CM: CPT | Performed by: OTOLARYNGOLOGY

## 2022-10-14 PROCEDURE — 7100000001 HC PACU RECOVERY - ADDTL 15 MIN: Performed by: OTOLARYNGOLOGY

## 2022-10-14 PROCEDURE — 6370000000 HC RX 637 (ALT 250 FOR IP): Performed by: STUDENT IN AN ORGANIZED HEALTH CARE EDUCATION/TRAINING PROGRAM

## 2022-10-14 PROCEDURE — 13132 CMPLX RPR F/C/C/M/N/AX/G/H/F: CPT | Performed by: OTOLARYNGOLOGY

## 2022-10-14 PROCEDURE — 3700000000 HC ANESTHESIA ATTENDED CARE: Performed by: OTOLARYNGOLOGY

## 2022-10-14 PROCEDURE — 2580000003 HC RX 258: Performed by: STUDENT IN AN ORGANIZED HEALTH CARE EDUCATION/TRAINING PROGRAM

## 2022-10-14 PROCEDURE — 93010 ELECTROCARDIOGRAM REPORT: CPT | Performed by: INTERNAL MEDICINE

## 2022-10-14 PROCEDURE — 6360000002 HC RX W HCPCS: Performed by: FAMILY MEDICINE

## 2022-10-14 PROCEDURE — 6360000002 HC RX W HCPCS: Performed by: STUDENT IN AN ORGANIZED HEALTH CARE EDUCATION/TRAINING PROGRAM

## 2022-10-14 PROCEDURE — 2580000003 HC RX 258: Performed by: FAMILY MEDICINE

## 2022-10-14 PROCEDURE — 2580000003 HC RX 258

## 2022-10-14 PROCEDURE — 2580000003 HC RX 258: Performed by: OTOLARYNGOLOGY

## 2022-10-14 RX ORDER — PROMETHAZINE HYDROCHLORIDE 25 MG/ML
12.5 INJECTION, SOLUTION INTRAMUSCULAR; INTRAVENOUS EVERY 6 HOURS PRN
Status: DISCONTINUED | OUTPATIENT
Start: 2022-10-14 | End: 2022-10-16 | Stop reason: HOSPADM

## 2022-10-14 RX ORDER — SODIUM CHLORIDE 0.9 % (FLUSH) 0.9 %
5-40 SYRINGE (ML) INJECTION EVERY 12 HOURS SCHEDULED
Status: DISCONTINUED | OUTPATIENT
Start: 2022-10-14 | End: 2022-10-14

## 2022-10-14 RX ORDER — ACETAMINOPHEN 325 MG/1
650 TABLET ORAL EVERY 6 HOURS PRN
Status: DISCONTINUED | OUTPATIENT
Start: 2022-10-14 | End: 2022-10-14 | Stop reason: ALTCHOICE

## 2022-10-14 RX ORDER — LANOLIN ALCOHOL/MO/W.PET/CERES
6 CREAM (GRAM) TOPICAL NIGHTLY PRN
Status: DISCONTINUED | OUTPATIENT
Start: 2022-10-14 | End: 2022-10-15

## 2022-10-14 RX ORDER — PROPOFOL 10 MG/ML
INJECTION, EMULSION INTRAVENOUS PRN
Status: DISCONTINUED | OUTPATIENT
Start: 2022-10-14 | End: 2022-10-14 | Stop reason: SDUPTHER

## 2022-10-14 RX ORDER — LORAZEPAM 2 MG/ML
3 INJECTION INTRAMUSCULAR
Status: DISCONTINUED | OUTPATIENT
Start: 2022-10-14 | End: 2022-10-16 | Stop reason: HOSPADM

## 2022-10-14 RX ORDER — ONDANSETRON 2 MG/ML
4 INJECTION INTRAMUSCULAR; INTRAVENOUS EVERY 6 HOURS PRN
Status: DISCONTINUED | OUTPATIENT
Start: 2022-10-14 | End: 2022-10-14 | Stop reason: SDUPTHER

## 2022-10-14 RX ORDER — SODIUM CHLORIDE 9 MG/ML
INJECTION, SOLUTION INTRAVENOUS PRN
Status: DISCONTINUED | OUTPATIENT
Start: 2022-10-14 | End: 2022-10-16 | Stop reason: HOSPADM

## 2022-10-14 RX ORDER — DEXAMETHASONE SODIUM PHOSPHATE 4 MG/ML
INJECTION, SOLUTION INTRA-ARTICULAR; INTRALESIONAL; INTRAMUSCULAR; INTRAVENOUS; SOFT TISSUE PRN
Status: DISCONTINUED | OUTPATIENT
Start: 2022-10-14 | End: 2022-10-14 | Stop reason: SDUPTHER

## 2022-10-14 RX ORDER — LORAZEPAM 1 MG/1
3 TABLET ORAL
Status: DISCONTINUED | OUTPATIENT
Start: 2022-10-14 | End: 2022-10-16 | Stop reason: HOSPADM

## 2022-10-14 RX ORDER — SODIUM CHLORIDE 0.9 % (FLUSH) 0.9 %
5-40 SYRINGE (ML) INJECTION EVERY 12 HOURS SCHEDULED
Status: DISCONTINUED | OUTPATIENT
Start: 2022-10-14 | End: 2022-10-14 | Stop reason: HOSPADM

## 2022-10-14 RX ORDER — ENOXAPARIN SODIUM 100 MG/ML
40 INJECTION SUBCUTANEOUS DAILY
Status: DISCONTINUED | OUTPATIENT
Start: 2022-10-14 | End: 2022-10-16 | Stop reason: HOSPADM

## 2022-10-14 RX ORDER — SODIUM CHLORIDE 0.9 % (FLUSH) 0.9 %
5-40 SYRINGE (ML) INJECTION PRN
Status: DISCONTINUED | OUTPATIENT
Start: 2022-10-14 | End: 2022-10-14 | Stop reason: HOSPADM

## 2022-10-14 RX ORDER — LIDOCAINE HYDROCHLORIDE AND EPINEPHRINE BITARTRATE 20; .01 MG/ML; MG/ML
INJECTION, SOLUTION SUBCUTANEOUS
Status: DISCONTINUED | OUTPATIENT
Start: 2022-10-14 | End: 2022-10-14 | Stop reason: ALTCHOICE

## 2022-10-14 RX ORDER — OXYCODONE HYDROCHLORIDE 5 MG/1
5 TABLET ORAL
Status: DISCONTINUED | OUTPATIENT
Start: 2022-10-14 | End: 2022-10-14 | Stop reason: HOSPADM

## 2022-10-14 RX ORDER — SODIUM CHLORIDE 9 MG/ML
INJECTION, SOLUTION INTRAVENOUS PRN
Status: DISCONTINUED | OUTPATIENT
Start: 2022-10-14 | End: 2022-10-14 | Stop reason: HOSPADM

## 2022-10-14 RX ORDER — ACETAMINOPHEN 650 MG/1
650 SUPPOSITORY RECTAL EVERY 6 HOURS PRN
Status: DISCONTINUED | OUTPATIENT
Start: 2022-10-14 | End: 2022-10-14 | Stop reason: ALTCHOICE

## 2022-10-14 RX ORDER — LORAZEPAM 1 MG/1
1 TABLET ORAL
Status: DISCONTINUED | OUTPATIENT
Start: 2022-10-14 | End: 2022-10-16 | Stop reason: HOSPADM

## 2022-10-14 RX ORDER — ONDANSETRON 2 MG/ML
4 INJECTION INTRAMUSCULAR; INTRAVENOUS
Status: DISCONTINUED | OUTPATIENT
Start: 2022-10-14 | End: 2022-10-14 | Stop reason: HOSPADM

## 2022-10-14 RX ORDER — MAGNESIUM HYDROXIDE 1200 MG/15ML
LIQUID ORAL CONTINUOUS PRN
Status: COMPLETED | OUTPATIENT
Start: 2022-10-14 | End: 2022-10-14

## 2022-10-14 RX ORDER — LIDOCAINE HYDROCHLORIDE 20 MG/ML
INJECTION, SOLUTION EPIDURAL; INFILTRATION; INTRACAUDAL; PERINEURAL PRN
Status: DISCONTINUED | OUTPATIENT
Start: 2022-10-14 | End: 2022-10-14 | Stop reason: SDUPTHER

## 2022-10-14 RX ORDER — GAUZE BANDAGE 2" X 2"
100 BANDAGE TOPICAL DAILY
Status: DISCONTINUED | OUTPATIENT
Start: 2022-10-14 | End: 2022-10-14

## 2022-10-14 RX ORDER — LORAZEPAM 1 MG/1
2 TABLET ORAL
Status: DISCONTINUED | OUTPATIENT
Start: 2022-10-14 | End: 2022-10-16 | Stop reason: HOSPADM

## 2022-10-14 RX ORDER — PROMETHAZINE HYDROCHLORIDE 25 MG/1
12.5 TABLET ORAL EVERY 6 HOURS PRN
Status: DISCONTINUED | OUTPATIENT
Start: 2022-10-14 | End: 2022-10-16 | Stop reason: HOSPADM

## 2022-10-14 RX ORDER — SUCCINYLCHOLINE/SOD CL,ISO/PF 200MG/10ML
SYRINGE (ML) INTRAVENOUS PRN
Status: DISCONTINUED | OUTPATIENT
Start: 2022-10-14 | End: 2022-10-14 | Stop reason: SDUPTHER

## 2022-10-14 RX ORDER — DROPERIDOL 2.5 MG/ML
0.62 INJECTION, SOLUTION INTRAMUSCULAR; INTRAVENOUS
Status: DISCONTINUED | OUTPATIENT
Start: 2022-10-14 | End: 2022-10-14 | Stop reason: HOSPADM

## 2022-10-14 RX ORDER — ONDANSETRON 2 MG/ML
INJECTION INTRAMUSCULAR; INTRAVENOUS PRN
Status: DISCONTINUED | OUTPATIENT
Start: 2022-10-14 | End: 2022-10-14 | Stop reason: SDUPTHER

## 2022-10-14 RX ORDER — BACITRACIN ZINC AND POLYMYXIN B SULFATE 500; 1000 [USP'U]/G; [USP'U]/G
OINTMENT TOPICAL
Status: COMPLETED | OUTPATIENT
Start: 2022-10-14 | End: 2022-10-14

## 2022-10-14 RX ORDER — ONDANSETRON 4 MG/1
4 TABLET, ORALLY DISINTEGRATING ORAL EVERY 8 HOURS PRN
Status: DISCONTINUED | OUTPATIENT
Start: 2022-10-14 | End: 2022-10-14 | Stop reason: SDUPTHER

## 2022-10-14 RX ORDER — LORAZEPAM 1 MG/1
4 TABLET ORAL
Status: DISCONTINUED | OUTPATIENT
Start: 2022-10-14 | End: 2022-10-16 | Stop reason: HOSPADM

## 2022-10-14 RX ORDER — LORAZEPAM 2 MG/ML
2 INJECTION INTRAMUSCULAR
Status: DISCONTINUED | OUTPATIENT
Start: 2022-10-14 | End: 2022-10-16 | Stop reason: HOSPADM

## 2022-10-14 RX ORDER — LORAZEPAM 2 MG/ML
1 INJECTION INTRAMUSCULAR
Status: DISCONTINUED | OUTPATIENT
Start: 2022-10-14 | End: 2022-10-16 | Stop reason: HOSPADM

## 2022-10-14 RX ORDER — SODIUM CHLORIDE 0.9 % (FLUSH) 0.9 %
5-40 SYRINGE (ML) INJECTION PRN
Status: DISCONTINUED | OUTPATIENT
Start: 2022-10-14 | End: 2022-10-14

## 2022-10-14 RX ORDER — DEXMEDETOMIDINE HYDROCHLORIDE 100 UG/ML
INJECTION, SOLUTION INTRAVENOUS PRN
Status: DISCONTINUED | OUTPATIENT
Start: 2022-10-14 | End: 2022-10-14 | Stop reason: SDUPTHER

## 2022-10-14 RX ORDER — MIDAZOLAM HYDROCHLORIDE 1 MG/ML
INJECTION INTRAMUSCULAR; INTRAVENOUS PRN
Status: DISCONTINUED | OUTPATIENT
Start: 2022-10-14 | End: 2022-10-14 | Stop reason: SDUPTHER

## 2022-10-14 RX ORDER — FENTANYL CITRATE 50 UG/ML
25 INJECTION, SOLUTION INTRAMUSCULAR; INTRAVENOUS EVERY 5 MIN PRN
Status: DISCONTINUED | OUTPATIENT
Start: 2022-10-14 | End: 2022-10-14 | Stop reason: HOSPADM

## 2022-10-14 RX ORDER — POLYETHYLENE GLYCOL 3350 17 G/17G
17 POWDER, FOR SOLUTION ORAL DAILY PRN
Status: DISCONTINUED | OUTPATIENT
Start: 2022-10-14 | End: 2022-10-14

## 2022-10-14 RX ORDER — LORAZEPAM 2 MG/ML
4 INJECTION INTRAMUSCULAR
Status: DISCONTINUED | OUTPATIENT
Start: 2022-10-14 | End: 2022-10-16 | Stop reason: HOSPADM

## 2022-10-14 RX ORDER — FENTANYL CITRATE 50 UG/ML
INJECTION, SOLUTION INTRAMUSCULAR; INTRAVENOUS PRN
Status: DISCONTINUED | OUTPATIENT
Start: 2022-10-14 | End: 2022-10-14 | Stop reason: SDUPTHER

## 2022-10-14 RX ADMIN — FENTANYL CITRATE 50 MCG: 50 INJECTION INTRAMUSCULAR; INTRAVENOUS at 14:40

## 2022-10-14 RX ADMIN — SODIUM CHLORIDE, POTASSIUM CHLORIDE, SODIUM LACTATE AND CALCIUM CHLORIDE: 600; 310; 30; 20 INJECTION, SOLUTION INTRAVENOUS at 15:40

## 2022-10-14 RX ADMIN — CEFEPIME 2000 MG: 2 INJECTION, POWDER, FOR SOLUTION INTRAVENOUS at 02:59

## 2022-10-14 RX ADMIN — Medication 100 MG: at 09:15

## 2022-10-14 RX ADMIN — Medication 140 MG: at 14:26

## 2022-10-14 RX ADMIN — ONDANSETRON 4 MG: 2 INJECTION INTRAMUSCULAR; INTRAVENOUS at 15:29

## 2022-10-14 RX ADMIN — ENOXAPARIN SODIUM 40 MG: 100 INJECTION SUBCUTANEOUS at 12:11

## 2022-10-14 RX ADMIN — DEXMEDETOMIDINE HYDROCHLORIDE 4 MCG: 100 INJECTION, SOLUTION INTRAVENOUS at 14:35

## 2022-10-14 RX ADMIN — METRONIDAZOLE 500 MG: 500 INJECTION, SOLUTION INTRAVENOUS at 22:30

## 2022-10-14 RX ADMIN — METRONIDAZOLE 500 MG: 500 INJECTION, SOLUTION INTRAVENOUS at 14:21

## 2022-10-14 RX ADMIN — SODIUM CHLORIDE, POTASSIUM CHLORIDE, SODIUM LACTATE AND CALCIUM CHLORIDE: 600; 310; 30; 20 INJECTION, SOLUTION INTRAVENOUS at 12:14

## 2022-10-14 RX ADMIN — LIDOCAINE HYDROCHLORIDE 100 MG: 20 INJECTION, SOLUTION EPIDURAL; INFILTRATION; INTRACAUDAL; PERINEURAL at 14:25

## 2022-10-14 RX ADMIN — DEXAMETHASONE SODIUM PHOSPHATE 4 MG: 4 INJECTION, SOLUTION INTRAMUSCULAR; INTRAVENOUS at 14:30

## 2022-10-14 RX ADMIN — Medication 6 MG: at 00:31

## 2022-10-14 RX ADMIN — MIDAZOLAM 2 MG: 1 INJECTION INTRAMUSCULAR; INTRAVENOUS at 14:25

## 2022-10-14 RX ADMIN — FENTANYL CITRATE 100 MCG: 50 INJECTION INTRAMUSCULAR; INTRAVENOUS at 14:25

## 2022-10-14 RX ADMIN — METRONIDAZOLE 500 MG: 500 INJECTION, SOLUTION INTRAVENOUS at 14:00

## 2022-10-14 RX ADMIN — DEXMEDETOMIDINE HYDROCHLORIDE 4 MCG: 100 INJECTION, SOLUTION INTRAVENOUS at 14:40

## 2022-10-14 RX ADMIN — PROPOFOL 200 MG: 10 INJECTION, EMULSION INTRAVENOUS at 14:26

## 2022-10-14 RX ADMIN — ACETAMINOPHEN 650 MG: 325 TABLET ORAL at 20:39

## 2022-10-14 RX ADMIN — CEFEPIME 2000 MG: 2 INJECTION, POWDER, FOR SOLUTION INTRAVENOUS at 16:38

## 2022-10-14 RX ADMIN — FENTANYL CITRATE 50 MCG: 50 INJECTION INTRAMUSCULAR; INTRAVENOUS at 15:25

## 2022-10-14 RX ADMIN — VANCOMYCIN HYDROCHLORIDE 1250 MG: 10 INJECTION, POWDER, LYOPHILIZED, FOR SOLUTION INTRAVENOUS at 12:16

## 2022-10-14 RX ADMIN — METRONIDAZOLE 500 MG: 500 INJECTION, SOLUTION INTRAVENOUS at 06:21

## 2022-10-14 RX ADMIN — Medication 6 MG: at 20:39

## 2022-10-14 RX ADMIN — VANCOMYCIN HYDROCHLORIDE 1500 MG: 10 INJECTION, POWDER, LYOPHILIZED, FOR SOLUTION INTRAVENOUS at 23:38

## 2022-10-14 ASSESSMENT — LIFESTYLE VARIABLES: SMOKING_STATUS: 1

## 2022-10-14 ASSESSMENT — PAIN SCALES - GENERAL: PAINLEVEL_OUTOF10: 0

## 2022-10-14 ASSESSMENT — PAIN - FUNCTIONAL ASSESSMENT: PAIN_FUNCTIONAL_ASSESSMENT: 0-10

## 2022-10-14 NOTE — CARE COORDINATION
Discharge Planning:  Consult noted. Pt is off the floor for surgery. SW will assess once pt is back up on the floor and time permits.    CHELSEA Baker  [phone]  Electronically signed by Rhonda Nix on 10/14/2022 at 4:28 PM

## 2022-10-14 NOTE — ANESTHESIA POSTPROCEDURE EVALUATION
[facility] Department of Anesthesiology  Post-Anesthesia Note       Name:  Peggy Souza                                  Age:  60 y.o. MRN:  [id number]     Last Vitals & Oxygen Saturation: /62   Pulse 84   Temp 98.3 °F (36.8 °C) (Oral)   Resp 19   Ht 5' 8\" (1.727 m)   Wt 181 lb (82.1 kg)   SpO2 97%   BMI 27.52 kg/m²   Patient Vitals for the past 4 hrs:   BP Temp Temp src Pulse Resp SpO2 Height Weight   10/14/22 1615 114/62 98.3 °F (36.8 °C) Oral 84 19 97 % -- --   10/14/22 1601 (!) 130/94 -- -- (!) 102 24 92 % -- --   10/14/22 1556 (!) 142/66 -- -- (!) 108 28 90 % -- --   10/14/22 1550 97/85 -- -- (!) 106 19 -- -- --   10/14/22 1543 107/67 98 °F (36.7 °C) Temporal 94 26 92 % -- --   10/14/22 1335 108/70 98.2 °F (36.8 °C) Oral 68 17 98 % 5' 8\" (1.727 m) 181 lb (82.1 kg)       Level of consciousness:  Awake, alert    Respiratory: Respirations easy, no distress. Stable. Cardiovascular: Hemodynamically stable. Hydration: Adequate. PONV: Adequately managed. Post-op pain: Adequately controlled. Post-op assessment: Tolerated anesthetic well without complication. Complications:  None.     Odin Anderson MD  October 14, 2022   5:15 PM

## 2022-10-14 NOTE — PROGRESS NOTES
Hospital Medicine Progress Note      Admit Date: 10/13/2022       CC: F/U for fall- dizzy in shower, injudry to ear and face (eyebrow); drinking before bed to help him sleep (7-8 drinks)    HPI: 60 y.o. male with a history of multiple medical problems who presented to [facility] with falls. Has been drinking quite a bit of alcohol at night time. No hx of seizures. He's been passing out and falling down recently. Lenin Constable in the shower 2 days ago and cut himself rather badly. Waited till now to come in to ED for evaluation. No other complaints. ENT consulted, will take to OR tomorrow to wash out right facial laceration and close. Interval History/Subjective: patient sitting up in bed. Daughter at bedside. Sounds as if he drinks quite a bit of alcohol daily including 4-6 beers and hard liquor after work. Daughter states he fell earlier in the week but patient didn't tell her until a couple days later. He does not have a PCP. [facility] referral will be given on discharge. States he drank and fell taking a shower 2- 3 days ago after passing out. He has a laceration to the right ear, right eyebrow/forehead. States he has been falling a lot lately at home. He is alert and oriented. He is having OR washout and repair this afternoon with ENT. He is on antibx. For his rhabdo, he is on LR infusion @ 150. Ck is improving. Workup otherwise unremarkable. Went over all labs and tests with patient and daughter at the bedside. Review of Systems:       The patient denied headaches, visual changes, LOC, SOB, CP, ABD pain, N/V/D, skin changes, new or worsening weakness or neuromuscular deficits. Comprehensive ROS negative except as mentioned above. Past Medical History:    History reviewed. No pertinent past medical history. Past Surgical History:    History reviewed. No pertinent surgical history. Allergies:  Patient has no known allergies. Past medical and surgical history reviewed.  Any changes have been noted.     PHYSICAL EXAM:  /71   Pulse 96   Temp 97.8 °F (36.6 °C) (Oral)   Resp 18   Ht 5' 8\" (1.727 m)   Wt 181 lb 3.5 oz (82.2 kg)   SpO2 97%   BMI 27.55 kg/m²       Intake/Output Summary (Last 24 hours) at 10/14/2022 0931  Last data filed at 10/14/2022 0355  Gross per 24 hour   Intake 51.51 ml   Output 200 ml   Net -148.49 ml        General appearance:   No apparent distress, appears stated age. Cooperative. HEENT: right ear with dressing covering ear. Laceration to center of outer pinna, looks necrotic, cartilage and soft tissue can be visualized at wound, possibly some part of the ear appears to be missing or possibly deformed from laceration; laceration right eye brow and upper eyelid ecchymosis right eye and edeam Normocephalic, atraumatic. PERRLA. EOMi. Conjunctivae/corneas clear, no icterus, non-injected. Neck: Supple, with full range of motion. No jugular venous distention. Trachea midline. Respiratory:  Normal respiratory effort. Clear to auscultation, bilaterally without Rales/Wheezes/Rhonchi. Cardiovascular:  Regular rate and rhythm without murmurs, rubs or gallops. Abdomen: Soft, non-tender, non-distended, without rebound or guarding. Normal bowel sounds. Musculoskeletal:  No clubbing, cyanosis or edema bilaterally. Full range of motion without deformity. Skin: Skin color, texture, turgor normal.  No rashes or lesions. Neurologic:  Neurovascularly intact without any focal sensory/motor deficits. Cranial nerves: II-XII intact, grossly intact. No facial asymmetry, tongue midline.    Psychiatric:  Alert and oriented, thought content appropriate  Capillary Refill: Brisk,< 3 seconds   Peripheral Pulses: +2 palpable, equal bilaterally                     LABS:    Lab Results   Component Value Date    WBC 13.0 (H) 10/13/2022    HGB 16.6 10/13/2022    HCT 47.3 10/13/2022    MCV 94.5 10/13/2022     (L) 10/13/2022    LYMPHOPCT 3.6 10/13/2022    RBC 5.00 10/13/2022    MCH 33.3 10/13/2022    MCHC 35.2 10/13/2022    RDW 14.0 10/13/2022       Lab Results   Component Value Date    CREATININE 0.6 (L) 10/13/2022    BUN 10 10/13/2022     (L) 10/13/2022    K 4.2 10/13/2022    CL 95 (L) 10/13/2022    CO2 22 10/13/2022       Lab Results   Component Value Date/Time    MG 1.90 10/13/2022 03:16 PM       Lab Results   Component Value Date    ALT 86 (H) 10/13/2022     (H) 10/13/2022    ALKPHOS 83 10/13/2022    BILITOT 1.5 (H) 10/13/2022        No flowsheet data found. No results found for: LABA1C    Imaging:  CT CHEST ABDOMEN PELVIS W CONTRAST Additional Contrast? None   Final Result   No acute abnormalities identified in the chest, abdomen or pelvis. Fusiform aneurysmal dilation of the infrarenal abdominal aorta with eccentric   thrombus formation. The aneurysm measures approximately 3.7 cm in diameter. RECOMMENDATIONS:   For management of fusiform AAA:      3.5-3.9 cm AAA, recommend follow-up every 2 years. Note: Recommend Vascular consultation if a fusiform AAA enlarges by >0.5 cm   in 6 months or >1 cm in 1 year or for a saccular AAA of any size. References: Ashley Cast Radiol 2013; 49(30):716-511; J Vasc Surg. 2018; 67:2-77         CT HEAD WO CONTRAST   Preliminary Result   No evidence of acute intracranial abnormality. CT CERVICAL SPINE WO CONTRAST   Final Result   No acute abnormality of the cervical spine. CT FACIAL BONES WO CONTRAST   Preliminary Result   1. No radiographic finding to suggest presence of acute facial bone fracture. 2. Findings suggestive of presence of multiple dental caries. Abnormal   lucency surrounding multiple teeth particularly the maxillary teeth may be on   the basis of underlying periodontal disease as described above. XR CHEST PORTABLE   Final Result   Minimal scarring/atelectatic changes seen in the lateral aspect of the left   lower lung field. No confluent airspace opacity seen.              Scheduled and prn Medications:    Scheduled Meds:   thiamine  100 mg Oral Daily    nicotine  1 patch TransDERmal Daily    sodium chloride flush  5-40 mL IntraVENous 2 times per day    metroNIDAZOLE  500 mg IntraVENous Q8H    cefepime  2,000 mg IntraVENous Q12H    vancomycin (VANCOCIN) intermittent dosing (placeholder)   Other RX Placeholder    vancomycin  1,250 mg IntraVENous Q12H     Continuous Infusions:   sodium chloride      lactated ringers 150 mL/hr at 10/13/22 7419     PRN Meds:.melatonin, sodium chloride flush, sodium chloride, ondansetron **OR** ondansetron, polyethylene glycol, acetaminophen **OR** acetaminophen    Assessment & Plan:        Recurrent falls likely 2/2 Alcohol abuse  - CIWA  - Thiamine  - IVF  - PT/OT evals  - social work for resources alcohol abuse  - urine drug screen and ETOH all neg    Alcohol abuse  - CIWA score if needed     Facial lacerations (eyebrow (R), eyelid (R); ear (R))  - ENT consulted, to OR 10/14/22 for repair and washout   - Recommend IV abx with vanc and zosyn. D/t rhabdo and risk for IQRA, will start with vanc, cefepime, and flagyl  - Bacitracin ointment     Rhabdomyolysis  - CK 6700 on admit-- improving   - Daily CK x3 checks  - LR @ 150 cc/h     Transaminitis  - With mild bilirubinemia, likely 2/2 alcohol abuse  - INR in AM  - Daily CMP     Lactic acidosis  - 4.2 on arrival  - Thiamine administration  - IVF as above    Continue current regimen/therapies. Monitor. Adjust medical regimen as appropriate. Body mass index is 27.55 kg/m². The patient and / or the family were informed of the results of any tests, a time was given to answer questions, a plan was proposed and they agreed with plan.       DVT ppx: lovenox      Diet: Diet NPO    Consults:  IP CONSULT TO OTOLARYNGOLOGY  PHARMACY TO DOSE VANCOMYCIN    DISPO/placement plan: pending     Code Status: Full Code      ARMAND Burroughs - CNP  10/14/22

## 2022-10-14 NOTE — PROGRESS NOTES
Patient return to unit @ 1615. VSS /62 HR84. A+O x4. Quiet and awake. Follows instructions to not touch new bandage on Right ear and face. Small amount of bloody drainage noted. Call light with in reach, family at bedside. All needs met at this time.  Electronically signed by Ester Garcia RN on 10/14/2022 at 5:46 PM

## 2022-10-14 NOTE — PROGRESS NOTES
Physical Therapy  Facility/Department: [facility] SURG  Physical Therapy Initial Assessment    Name: Symone Brian  : 1960  MRN: [id number]  Date of Service: 10/14/2022    Discharge Recommendations:  Home independently          Patient Diagnosis(es): The primary encounter diagnosis was Syncope, unspecified syncope type. Diagnoses of Complex laceration of right ear, initial encounter, Forehead laceration, initial encounter, Alcohol abuse, Elevated LFTs, Infrarenal abdominal aortic aneurysm (AAA) without rupture, and Traumatic rhabdomyolysis, initial encounter [facility]) were also pertinent to this visit. Past Medical History:  has no past medical history on file. Past Surgical History:  has no past surgical history on file. Assessment   Assessment: Pt is a 60 y.o. male who presented to the ED on 10/13/22 with right ear and facial laceration secondary to fall in shower when dizzy. Prior to admission, pt living alone in home setting, independent with ADLs and ambulation without device, working full time. Pt currently functioning near baseline. Anticipate return home independently.   Therapy Prognosis: Good  Decision Making: Low Complexity  History: see above  Exam: see below  Clinical Presentation: stable  No Skilled PT: Independent with functional mobility   Requires PT Follow-Up: No  Activity Tolerance  Activity Tolerance: Patient tolerated treatment well     Plan   Physcial Therapy Plan  General Plan: Discharge with evaluation only  Safety Devices  Type of Devices: Call light within reach, Left in bed, Nurse notified     Restrictions  Restrictions/Precautions  Restrictions/Precautions: Fall Risk  Position Activity Restriction  Other position/activity restrictions: CIWA     Subjective   General  Chart Reviewed: Yes  Patient assessed for rehabilitation services?: Yes  Additional Pertinent Hx: Pt is a 60 y.o. male who presented to the ED on 10/13/22 with right ear and facial laceration secondary to fall in shower when dizzy. Response To Previous Treatment: Not applicable  Family / Caregiver Present: No  Referring Practitioner: ARMAND Archuleta CNP  Referral Date : 10/14/22  Diagnosis: fall; right ear laceration  Follows Commands: Within Functional Limits  Subjective  Subjective: Pt is agreeable to PT         Social/Functional History  Social/Functional History  Lives With: Alone  Type of Home: Apartment  Home Layout: One level  Home Access: Level entry  Bathroom Shower/Tub: Tub/Shower unit  Bathroom Toilet: Standard  Has the patient had two or more falls in the past year or any fall with injury in the past year?: Yes (one fall in shower - dizzy)  ADL Assistance: [address]: Independent  Ambulation Assistance: Independent  Transfer Assistance: Independent  Active : Yes  Mode of Transportation: Car  Occupation: Full time employment  Type of Occupation: [employer]  Leisure & Hobbies: golf; hang out with friends    Vision/Hearing  Hearing  Hearing: Within functional limits      Cognition   Orientation  Overall Orientation Status: Within Functional Limits  Cognition  Overall Cognitive Status: WFL     Objective   Gross Assessment  Strength:  Within functional limits       Bed mobility  Supine to Sit: Modified independent  Sit to Supine: Modified independent  Transfers  Sit to Stand: Independent  Stand to Sit: Independent  Ambulation  Surface: Level tile  Device: No Device  Assistance: Independent  Gait Deviations: None  Distance: 120'     Balance  Posture: Good  Sitting - Static: Good  Sitting - Dynamic: Good  Standing - Static: Good  Standing - Dynamic: Good             AM-PAC Score  AM-PAC Inpatient Mobility Raw Score : 24 (10/14/22 1147)  AM-PAC Inpatient T-Scale Score : 61.14 (10/14/22 1147)  Mobility Inpatient CMS 0-100% Score: 0 (10/14/22 1147)  Mobility Inpatient CMS G-Code Modifier : [address] (10/14/22 1147)          Education  Patient Education  Education Given To: Patient  Education Provided: Role of Therapy;Plan of Care  Education Method: Demonstration  Barriers to Learning: None  Education Outcome: Verbalized understanding      Therapy Time   Individual Concurrent Group Co-treatment   Time In 1120         Time Out 1143         Minutes 23         Timed Code Treatment Minutes: 130 Omari Vo, PT

## 2022-10-14 NOTE — CONSULTS
Clinical Pharmacy Note  Vancomycin Consult    Eliazar García is a 60 y.o. male ordered Vancomycin for skin/soft tissue infection; consult received from Dr. Kourtney Preston to manage therapy. Also receiving cefepime and metronidazole. Allergies:  Patient has no known allergies. Temp max:  Temp (24hrs), Av.2 °F (36.8 °C), Min:97.8 °F (36.6 °C), Max:98.4 °F (36.9 °C)      Recent Labs     10/13/22  1241   WBC 13.0*       Recent Labs     10/13/22  1241   BUN 10   CREATININE 0.6*         Intake/Output Summary (Last 24 hours) at 10/13/2022 2326  Last data filed at 10/13/2022 1705  Gross per 24 hour   Intake 51.51 ml   Output --   Net 51.51 ml       Culture Results:  pending    Ht Readings from Last 1 Encounters:   10/13/22 5' 8\" (1.727 m)        Wt Readings from Last 1 Encounters:   10/13/22 181 lb 3.5 oz (82.2 kg)         Estimated Creatinine Clearance: 132 mL/min (A) (based on SCr of 0.6 mg/dL (L)). Assessment  -59 y/o M presented to ED with large lacerations to ear and forehead after fall in shower 2 days ago. -Met SIRS criteria upon arrival with WBC 13, , and source of infection of lacerations. Plan:  Day # 1 of vancomycin. Vancomycin 1250 mg IV every 12 hours ordered. Goal -600  Predicted   Level ordered for 2100 on 10/14 (after 2 total doses)    Thank you for the consult.    Adeel Smith, PharmD  10/13/2022 11:30 PM

## 2022-10-14 NOTE — PROGRESS NOTES
Occupational Therapy  [facility]  Occupational Therapy Initial Assessment/Discharge    Name: Dreek Watters  : 1960  MRN: [id number]  Date of Service: 10/14/2022    Discharge Recommendations:  Home with assist PRN     Derek Watters scored a 23/24 on the AM-PAC ADL Inpatient form. At this time, no further OT is recommended upon discharge due to pt near baseline. Recommend patient returns to prior setting with prior services. Patient Diagnosis(es): The primary encounter diagnosis was Syncope, unspecified syncope type. Diagnoses of Complex laceration of right ear, initial encounter, Forehead laceration, initial encounter, Alcohol abuse, Elevated LFTs, Infrarenal abdominal aortic aneurysm (AAA) without rupture, and Traumatic rhabdomyolysis, initial encounter [facility]) were also pertinent to this visit. Past Medical History:  has no past medical history on file. Past Surgical History:  has no past surgical history on file. Assessment   Assessment: Pt is a 61 yo M admitted d/t a fall at home - has been drinking a lot alcohol and passed out in the shower. He now has multiple facial lacerations and is awaiting surgery. PTA, pt lives alone in an apt where he is typically independent in self-care, fxl mobility, and still works FT. He is functioning close to baseline today, completing fxl transfers and mobility independently, and anticipate overall independent/supervision for ADLs. Pt denies concern for d/c home, will sign off. Anticipate pt will be safe to return home w/ assist PRN when medically stable.   Decision Making: Medium Complexity  REQUIRES OT FOLLOW-UP: No  Activity Tolerance  Activity Tolerance: Patient Tolerated treatment well        Plan   Occupational Therapy Plan  Times Per Week: d/c OT     Restrictions  Restrictions/Precautions  Restrictions/Precautions: Fall Risk  Position Activity Restriction  Other position/activity restrictions: CIWA    Subjective General  Chart Reviewed: Yes  Patient assessed for rehabilitation services?: Yes  Additional Pertinent Hx: per H&P: \"60 y.o. male with a history of multiple medical problems who presented to [facility] with falls. Has been drinking quite a bit of alcohol at night time. No hx of seizures. He's been passing out and falling down recently. Cyrilla Henna in the showed 2 days ago and cut himself rather badly. Waited till now to come in to ED for evaluation. ENT consulted, will take to OR tomorrow to wash out right facial laceration and close. \"  Family / Caregiver Present: Yes (Dtr)  Referring Practitioner: ARMAND Glez CNP  Diagnosis: Frequent falls, facial lacerations  Subjective  Subjective: Pt met b/s for OT eval/tx w/ PT. Pt in bed, agreeable to therapy. Denied pain  General Comment  Comments: Per RN ok to see     Social/Functional History  Social/Functional History  Lives With: Alone  Type of Home: Apartment  Home Layout: One level  Home Access: Level entry  Bathroom Shower/Tub: Tub/Shower unit  Bathroom Toilet: Standard  Has the patient had two or more falls in the past year or any fall with injury in the past year?: Yes (one fall in shower - dizzy)  ADL Assistance: [address] [place] [address] Avenue: Independent  Ambulation Assistance: Independent  Transfer Assistance: Independent  Active : Yes  Mode of Transportation: Car  Occupation: Full time employment  Type of Occupation: [employer]  Leisure & Hobbies: golf; hang out with friends       Objective   Safety Devices  Type of Devices: Call light within reach; Left in bed;Nurse notified  Balance  Sitting: Intact  Standing: Intact  Gait  Overall Level of Assistance: Independent (Fxl mobility ~40 ft independently, no LOB noted)     AROM: Within functional limits  PROM: Within functional limits  Strength:  Within functional limits  Coordination: Within functional limits  ADL  Additional Comments: Pt declined ADLs, anticipate he would be independent w/ most ADLs, up to supervision for bathing based on ROM, strength, endurance this session     Activity Tolerance  Activity Tolerance: Patient tolerated treatment well  Bed mobility  Supine to Sit: Modified independent  Sit to Supine: Modified independent  Transfers  Sit to stand: Independent  Stand to sit:  Independent  Hearing  Hearing: Within functional limits  Cognition  Overall Cognitive Status: WFL  Orientation  Overall Orientation Status: Within Functional Limits                  Education Given To: Patient  Education Provided: Role of Therapy  Education Method: Verbal  Barriers to Learning: None  Education Outcome: Verbalized understanding              AM-PAC Score  AM-[facility] Inpatient Daily Activity Raw Score: 23 (10/14/22 1146)  AM-PAC Inpatient ADL T-Scale Score : 51.12 (10/14/22 1146)  ADL Inpatient CMS 0-100% Score: 15.86 (10/14/22 1146)  ADL Inpatient CMS G-Code Modifier : CI (10/14/22 1146)    Goals  Short Term Goals  Time Frame for Short Term Goals: No acute care OT goals identified, pt nearing baseline function  Patient Goals   Patient goals : to go home       Therapy Time   Individual Concurrent Group Co-treatment   Time In 1120         Time Out 1143         Minutes 23         Timed Code Treatment Minutes: [address], [address]

## 2022-10-14 NOTE — PROGRESS NOTES
Patient is status post closure of his right facial and right ear lacerations. Plan per ENT  Patient is okay to discharge from my standpoint  -1 week of broad-spectrum antibiotics with Augmentin and ciprofloxacin  -Follow-up in 1 week  -Bacitracin ointment to incisions twice a day  -May remove head wrap to change gauze dressing and apply bacitracin as needed.

## 2022-10-14 NOTE — PROGRESS NOTES
Patient VSS, A+Ox4. Transported off unit for surgery. Consent form signed. All needs met at this time.  Electronically signed by Ester Garcia RN on 10/14/2022 at 1:48 PM

## 2022-10-14 NOTE — OP NOTE
[address] [facility] SURGERY  OPERATIVE REPORT    Patient Name: Nanda Faulkner  YOB: 1960  Medical Record Number:  [id number]  Billing Number:  [id number]  Date of Procedure: [unfilled]  Time: 5041    Pre Operative Diagnoses: Right facial laceration  Right ear laceration    Post Operative Diagnoses:    Same             Procedure: Complex closure of right facial laceration (19377)  Complex closure of right ear laceration (73428)       Surgeon: Dominick Cowan MD    OR Staff/ Assistant:  Circulator: Jadon Munoz RN  Surgical Assistant: Jazmin Barrett RN  Scrub Person First: Megan Roger  Circulator Assist: Colleen Wagner RN    Anesthesia:  General anesthesia. Findings:  1) 3.5 cm laceration extending from the right forehead, through the right brow and into the right eyelid. Complex closure requiring debridement of tissue, undermining and closure of multiple layers    2. Complex laceration of right ear with nonviable right helical cartilage and skin. Complex closure after debridement measured approximately 4 cm in length    Indications: This is a 60 y.o. male who presented to the emergency room yesterday after falling in his bathtub 48 hours prior. He had complex lacerations involving the right forehead and right ear. This is was made to admit him for some IV antibiotics for 24 hours prior to closure given the delay in presentation. Risks and benefits discussed with the patient including alternate treatment options, Informed consent was obtained, the patient elected to proceed with the planned procedure. DETAILS OF PROCEDURE(S):   The patient was brought to the operating room and placed in supine position on the operating table. He underwent uncomplicated general anesthesia with endotracheal intubation. The head of bed was turned 180 degrees. The right brow and right ear wounds were copiously debrided with bacitracin and a scrub brush.   There were then irrigated with saline. The patient was then prepped and draped. Attention was first turned to the right brow laceration. The wound was irrigated further. There was some nonviable wound edges that were debrided. The laceration extended to the frontal bone superiorly. More medially the frontalis muscle appeared to be intact. The laceration approached the orbital septum, but did not violate it. There was no evidence of orbital fat. After debridement of the wound it was closed in multiple layers with simple interrupted deep 4-0 Vicryl sutures followed by a running 5-0 Prolene. Bacitracin was applied to    Attention was then turned to the right ear. This was a nonlinear laceration, but rather stellate. There was nonviable conchal and helical cartilage removed. There was also nonviable skin of the fawn and helix removed. The wound edges were freshened. The wound was then closed multiple layers. Some deep interrupted 4-0 Vicryl sutures were used to close some of the deeper tissue and to reapproximate the cartilage. The 4-0 Vicryl sutures were then used to close skin in a simple interrupted fashion. Some of the superior helical skin was ripped off of the underlying cartilage. Some of it was nonviable so it could not be entirely closed. It was brought back into its approximate position and a quilting 4-0 plain gut suture was used to hold it in place. Bacitracin was applied followed by a [place] dressing    I attest that I was present for and did the entire procedure myself. Estimated Blood Loss: 25 mL                      Complications: There were no complications.     Payton Lennon MD

## 2022-10-14 NOTE — ANESTHESIA PRE PROCEDURE
Department of Anesthesiology  Preprocedure Note       Name:  Nydia Horton   Age:  60 y.o.  :  1960                                          MRN:  [id number]         Date:  10/14/2022      Surgeon: Deisy Kumar):  Guera Steele MD    Procedure: Procedure(s):  COMPLEX CLOSURE OF RIGHT FACE AND EAR DEFECT    Medications prior to admission:   Prior to Admission medications    Not on File       Current medications:    Current Facility-Administered Medications   Medication Dose Route Frequency Provider Last Rate Last Admin    melatonin tablet 6 mg  6 mg Oral Nightly PRN Alicia Corpus, APRN - CNP   6 mg at 10/14/22 0031    0.9 % sodium chloride infusion   IntraVENous PRN Snow Mad, APRN - CNP        enoxaparin (LOVENOX) injection 40 mg  40 mg SubCUTAneous Daily Herbert Mad, APRN - CNP   40 mg at 10/14/22 1211    LORazepam (ATIVAN) tablet 1 mg  1 mg Oral Q1H PRN Snow Mad, APRN - CNP        Or    LORazepam (ATIVAN) injection 1 mg  1 mg IntraVENous Q1H PRN Herbert Mad, APRN - CNP        Or    LORazepam (ATIVAN) tablet 2 mg  2 mg Oral Q1H PRN Snow Mad, APRN - CNP        Or    LORazepam (ATIVAN) injection 2 mg  2 mg IntraVENous Q1H PRN Snow Mad, APRN - CNP        Or    LORazepam (ATIVAN) tablet 3 mg  3 mg Oral Q1H PRN Herbert Mad, APRN - CNP        Or    LORazepam (ATIVAN) injection 3 mg  3 mg IntraVENous Q1H PRN Herbert Mad, APRN - CNP        Or    LORazepam (ATIVAN) tablet 4 mg  4 mg Oral Q1H PRN Snow Mad, APRN - CNP        Or    LORazepam (ATIVAN) injection 4 mg  4 mg IntraVENous Q1H PRN Herbert Mad, APRN - CNP        promethazine (PHENERGAN) injection 12.5 mg  12.5 mg IntraMUSCular Q6H PRN Snow Mad, APRN - CNP        promethazine (PHENERGAN) tablet 12.5 mg  12.5 mg Oral Q6H PRN Herbert Mad, APRN - CNP        thiamine mononitrate tablet 100 mg  100 mg Oral Daily Michelle Russell MD   100 mg at 10/14/22 0915    nicotine (NICODERM CQ) 21 MG/24HR 1 patch  1 patch TransDERmal Daily Chela Wheatley MD   1 patch at 10/13/22 1539    sodium chloride flush 0.9 % injection 5-40 mL  5-40 mL IntraVENous 2 times per day Chela Wheatley MD        sodium chloride flush 0.9 % injection 5-40 mL  5-40 mL IntraVENous PRN Chela Wheatley MD        0.9 % sodium chloride infusion   IntraVENous PRN Chela Wheatley MD        polyethylene glycol (GLYCOLAX) packet 17 g  17 g Oral Daily PRN Chela Wheatley MD        acetaminophen (TYLENOL) tablet 650 mg  650 mg Oral Q6H PRN Chela Wheatley MD        Or    acetaminophen (TYLENOL) suppository 650 mg  650 mg Rectal Q6H PRN Chela Wheatley MD        lactated ringers infusion   IntraVENous Continuous Chela Wheatley  mL/hr at 10/14/22 1214 New Bag at 10/14/22 1214    metronidazole (FLAGYL) 500 mg in 0.9% NaCl 100 mL IVPB premix  500 mg IntraVENous Q8H Chela Wheatley MD   Stopped at 10/14/22 0808    cefepime (MAXIPIME) 2000 mg IVPB minibag  2,000 mg IntraVENous Q12H [facility]   Stopped at 10/14/22 0640    vancomycin (VANCOCIN) intermittent dosing (placeholder)   Other RX Placeholder Chela Wheatley MD        vancomycin (VANCOCIN) 1250 mg in dextrose 5 % 250 mL IVPB  1,250 mg IntraVENous Q12H Chela Wheatley .7 mL/hr at 10/14/22 1216 1,250 mg at 10/14/22 1216       Allergies:  No Known Allergies    Problem List:    Patient Active Problem List   Diagnosis Code    Frequent falls R29.6    Fall at home, initial encounter [address], Y92.009       Past Medical History:  History reviewed. No pertinent past medical history. Past Surgical History:  History reviewed. No pertinent surgical history. Social History:    Social History     Tobacco Use    Smoking status: Every Day     Types: Cigarettes    Smokeless tobacco: Never   Substance Use Topics    Alcohol use:  Yes     Alcohol/week: 77.0 standard drinks     Types: 42 Cans of beer, 35 Shots of liquor per week                                Ready to quit: No  Counseling given: Yes      Vital Signs (Current):   Vitals:    10/14/22 0355 10/14/22 0733 10/14/22 0900 10/14/22 1141   BP: 126/77 123/71  124/80   Pulse: (!) 111 96 82 88   Resp: 18 18  18   Temp: 98.2 °F (36.8 °C) 97.8 °F (36.6 °C)  97.8 °F (36.6 °C)   TempSrc: Oral Oral  Oral   SpO2: 96% 97%  97%   Weight:       Height:                                                  BP Readings from Last 3 Encounters:   10/14/22 124/80       NPO Status: Time of last liquid consumption: 2100                        Time of last solid consumption: 2000                        Date of last liquid consumption: 10/13/22                        Date of last solid food consumption: 10/13/22    BMI:   Wt Readings from Last 3 Encounters:   10/14/22 181 lb 3.5 oz (82.2 kg)     Body mass index is 27.55 kg/m². CBC:   Lab Results   Component Value Date/Time    WBC 8.7 10/14/2022 08:44 AM    RBC 4.18 10/14/2022 08:44 AM    HGB 14.0 10/14/2022 08:44 AM    HCT 40.1 10/14/2022 08:44 AM    MCV 95.9 10/14/2022 08:44 AM    RDW 13.5 10/14/2022 08:44 AM    PLT 98 10/14/2022 08:44 AM       CMP:   Lab Results   Component Value Date/Time     10/14/2022 08:44 AM    K 3.7 10/14/2022 08:44 AM     10/14/2022 08:44 AM    CO2 23 10/14/2022 08:44 AM    BUN 9 10/14/2022 08:44 AM    CREATININE 0.5 10/14/2022 08:44 AM    GFRAA >60 10/14/2022 08:44 AM    AGRATIO 1.7 10/13/2022 12:41 PM    LABGLOM >60 10/14/2022 08:44 AM    GLUCOSE 100 10/14/2022 08:44 AM    PROT 7.2 10/13/2022 12:41 PM    CALCIUM 8.4 10/14/2022 08:44 AM    BILITOT 1.5 10/13/2022 03:16 PM    ALKPHOS 83 10/13/2022 12:41 PM     10/13/2022 12:41 PM    ALT 86 10/13/2022 12:41 PM       POC Tests: No results for input(s): POCGLU, POCNA, POCK, POCCL, POCBUN, POCHEMO, POCHCT in the last 72 hours.     Coags: No results found for: PROTIME, INR, APTT    HCG (If Applicable): No results found for: PREGTESTUR, PREGSERUM, HCG, HCGQUANT     ABGs: No results found for: PHART, PO2ART, ATV5NKP, XNJ2JYT, BEART, A0QIHMFN     Type & Screen (If Applicable):  No results found for: LABABO, LABRH    Drug/Infectious Status (If Applicable):  No results found for: HIV, HEPCAB    COVID-19 Screening (If Applicable): No results found for: COVID19        Anesthesia Evaluation  Patient summary reviewed no history of anesthetic complications:   Airway: Mallampati: III  TM distance: >3 FB   Neck ROM: full  Mouth opening: > = 3 FB   Dental:    (+) upper dentures      Pulmonary:normal exam    (+) current smoker                           Cardiovascular:  Exercise tolerance: good (>4 METS),   (+) dysrhythmias (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 Other Findings:           Anesthesia Plan      general     ASA 3     (62 yo M with PMHx of ETOH abuse presents for closure of right eyebrow and ear lacerations. Alcoholic with large lacerations. Denies any prior cardiac issues, his fall likely due to ETOH intoxication. Does have RBBB and LAFB on EKG with q waves. Will monitor. I discussed with the patient the risks and benefits to general anesthesia including possible anesthetic complications but not limited to: PONV, damage to the airway and surrounding structures (teeth, lips, gums, tongue, etc.), adverse reactions to medications, cardiac complications (MI, CHF, arrhythmias, etc.), respiratory complications (post-op ventilation, respiratory failure, etc.), neurologic complications (nerve damage, stroke, seizure) and death.  The patient was given the opportunity to ask questions and all questions were answered to the patient's satisfaction.  )  Induction: intravenous. MIPS: Postoperative opioids intended and Prophylactic antiemetics administered. Anesthetic plan and risks discussed with patient. Plan discussed with CRNA. This pre-anesthesia assessment may be used as a history and physical.    DOS STAFF ADDENDUM:    Pt seen and examined, chart reviewed (including anesthesia, drug and allergy history). No interval changes to history and physical examination. Anesthetic plan, risks, benefits, alternatives, and personnel involved discussed with patient. Patient verbalized an understanding and agrees to proceed.       Benny Mahoney MD  October 14, 2022  1:37 PM

## 2022-10-15 LAB
A/G RATIO: 1.5 (ref 1.1–2.2)
ALBUMIN SERPL-MCNC: 3.3 G/DL (ref 3.4–5)
ALP BLD-CCNC: 62 U/L (ref 40–129)
ALT SERPL-CCNC: 65 U/L (ref 10–40)
ANION GAP SERPL CALCULATED.3IONS-SCNC: 14 MMOL/L (ref 3–16)
AST SERPL-CCNC: 139 U/L (ref 15–37)
BASOPHILS ABSOLUTE: 0 K/UL (ref 0–0.2)
BASOPHILS RELATIVE PERCENT: 0.2 %
BILIRUB SERPL-MCNC: 0.9 MG/DL (ref 0–1)
BUN BLDV-MCNC: 11 MG/DL (ref 7–20)
CALCIUM SERPL-MCNC: 8.3 MG/DL (ref 8.3–10.6)
CHLORIDE BLD-SCNC: 101 MMOL/L (ref 99–110)
CO2: 21 MMOL/L (ref 21–32)
CREAT SERPL-MCNC: <0.5 MG/DL (ref 0.8–1.3)
EOSINOPHILS ABSOLUTE: 0 K/UL (ref 0–0.6)
EOSINOPHILS RELATIVE PERCENT: 0.2 %
GFR AFRICAN AMERICAN: >60
GFR NON-AFRICAN AMERICAN: >60
GLUCOSE BLD-MCNC: 91 MG/DL (ref 70–99)
HCT VFR BLD CALC: 37.5 % (ref 40.5–52.5)
HEMOGLOBIN: 12.9 G/DL (ref 13.5–17.5)
LYMPHOCYTES ABSOLUTE: 0.9 K/UL (ref 1–5.1)
LYMPHOCYTES RELATIVE PERCENT: 11.4 %
MCH RBC QN AUTO: 33.6 PG (ref 26–34)
MCHC RBC AUTO-ENTMCNC: 34.4 G/DL (ref 31–36)
MCV RBC AUTO: 97.8 FL (ref 80–100)
MONOCYTES ABSOLUTE: 0.7 K/UL (ref 0–1.3)
MONOCYTES RELATIVE PERCENT: 8.8 %
NEUTROPHILS ABSOLUTE: 6.3 K/UL (ref 1.7–7.7)
NEUTROPHILS RELATIVE PERCENT: 79.4 %
PDW BLD-RTO: 13.8 % (ref 12.4–15.4)
PLATELET # BLD: 83 K/UL (ref 135–450)
PMV BLD AUTO: 8.3 FL (ref 5–10.5)
POTASSIUM REFLEX MAGNESIUM: 3.8 MMOL/L (ref 3.5–5.1)
RBC # BLD: 3.83 M/UL (ref 4.2–5.9)
SODIUM BLD-SCNC: 136 MMOL/L (ref 136–145)
TOTAL CK: 2352 U/L (ref 39–308)
TOTAL PROTEIN: 5.5 G/DL (ref 6.4–8.2)
WBC # BLD: 8 K/UL (ref 4–11)

## 2022-10-15 PROCEDURE — 6360000002 HC RX W HCPCS

## 2022-10-15 PROCEDURE — 85025 COMPLETE CBC W/AUTO DIFF WBC: CPT

## 2022-10-15 PROCEDURE — 6370000000 HC RX 637 (ALT 250 FOR IP): Performed by: NURSE PRACTITIONER

## 2022-10-15 PROCEDURE — 80053 COMPREHEN METABOLIC PANEL: CPT

## 2022-10-15 PROCEDURE — 82550 ASSAY OF CK (CPK): CPT

## 2022-10-15 PROCEDURE — 2500000003 HC RX 250 WO HCPCS: Performed by: STUDENT IN AN ORGANIZED HEALTH CARE EDUCATION/TRAINING PROGRAM

## 2022-10-15 PROCEDURE — 6360000002 HC RX W HCPCS: Performed by: FAMILY MEDICINE

## 2022-10-15 PROCEDURE — 36415 COLL VENOUS BLD VENIPUNCTURE: CPT

## 2022-10-15 PROCEDURE — 6360000002 HC RX W HCPCS: Performed by: NURSE PRACTITIONER

## 2022-10-15 PROCEDURE — 6370000000 HC RX 637 (ALT 250 FOR IP): Performed by: STUDENT IN AN ORGANIZED HEALTH CARE EDUCATION/TRAINING PROGRAM

## 2022-10-15 PROCEDURE — 2580000003 HC RX 258: Performed by: STUDENT IN AN ORGANIZED HEALTH CARE EDUCATION/TRAINING PROGRAM

## 2022-10-15 PROCEDURE — 2580000003 HC RX 258

## 2022-10-15 PROCEDURE — 1200000000 HC SEMI PRIVATE

## 2022-10-15 PROCEDURE — 2580000003 HC RX 258: Performed by: FAMILY MEDICINE

## 2022-10-15 RX ORDER — TRAZODONE HYDROCHLORIDE 50 MG/1
50 TABLET ORAL NIGHTLY
Status: DISCONTINUED | OUTPATIENT
Start: 2022-10-15 | End: 2022-10-16 | Stop reason: HOSPADM

## 2022-10-15 RX ORDER — GINSENG 100 MG
CAPSULE ORAL 2 TIMES DAILY
Status: DISCONTINUED | OUTPATIENT
Start: 2022-10-15 | End: 2022-10-16 | Stop reason: HOSPADM

## 2022-10-15 RX ADMIN — BACITRACIN: 500 OINTMENT TOPICAL at 21:44

## 2022-10-15 RX ADMIN — TRAZODONE HYDROCHLORIDE 50 MG: 50 TABLET ORAL at 20:40

## 2022-10-15 RX ADMIN — Medication 10 ML: at 09:02

## 2022-10-15 RX ADMIN — SODIUM CHLORIDE, POTASSIUM CHLORIDE, SODIUM LACTATE AND CALCIUM CHLORIDE: 600; 310; 30; 20 INJECTION, SOLUTION INTRAVENOUS at 08:49

## 2022-10-15 RX ADMIN — VANCOMYCIN HYDROCHLORIDE 1500 MG: 10 INJECTION, POWDER, LYOPHILIZED, FOR SOLUTION INTRAVENOUS at 12:49

## 2022-10-15 RX ADMIN — METRONIDAZOLE 500 MG: 500 INJECTION, SOLUTION INTRAVENOUS at 21:38

## 2022-10-15 RX ADMIN — ACETAMINOPHEN 650 MG: 325 TABLET ORAL at 02:56

## 2022-10-15 RX ADMIN — METRONIDAZOLE 500 MG: 500 INJECTION, SOLUTION INTRAVENOUS at 16:38

## 2022-10-15 RX ADMIN — Medication 100 MG: at 08:47

## 2022-10-15 RX ADMIN — ENOXAPARIN SODIUM 40 MG: 100 INJECTION SUBCUTANEOUS at 08:47

## 2022-10-15 RX ADMIN — CEFEPIME 2000 MG: 2 INJECTION, POWDER, FOR SOLUTION INTRAVENOUS at 03:19

## 2022-10-15 RX ADMIN — CEFEPIME 2000 MG: 2 INJECTION, POWDER, FOR SOLUTION INTRAVENOUS at 17:42

## 2022-10-15 RX ADMIN — METRONIDAZOLE 500 MG: 500 INJECTION, SOLUTION INTRAVENOUS at 08:51

## 2022-10-15 ASSESSMENT — PAIN SCALES - GENERAL: PAINLEVEL_OUTOF10: 7

## 2022-10-15 NOTE — PROGRESS NOTES
Clinical Pharmacy Note  Vancomycin Consult    Eliazar García is a 60 y.o. male ordered Vancomycin for skin/soft tissue infection; consult received from Dr. Kourtney Preston to manage therapy. Also receiving cefepime. Allergies:  Patient has no known allergies. Temp max:  Temp (24hrs), Av.2 °F (36.8 °C), Min:97.8 °F (36.6 °C), Max:99.1 °F (37.3 °C)      Recent Labs     10/13/22  1241 10/14/22  0844   WBC 13.0* 8.7       Recent Labs     10/13/22  1241 10/14/22  0844   BUN 10 9   CREATININE 0.6* 0.5*         Intake/Output Summary (Last 24 hours) at 10/14/2022 2331  Last data filed at 10/14/2022 1630  Gross per 24 hour   Intake 1100 ml   Output 625 ml   Net 475 ml       Culture Results:  pending    Ht Readings from Last 1 Encounters:   10/14/22 5' 8\" (1.727 m)        Wt Readings from Last 1 Encounters:   10/14/22 181 lb (82.1 kg)         Estimated Creatinine Clearance: 158 mL/min (A) (based on SCr of 0.5 mg/dL (L)). Assessment:  Day # 2 of vancomycin. Current regimen: 1250 mg every 12 hours  Vancomycin level: 8.1 mg/L (9 hours post dose)  Predicted AUC: 412    Plan:  Change regimen to 1500 mg every 12 hours. Yields an estimated AUC of 493 and trough 13.7 mg/L  Will order dose after 3 doses of new regimen (0900 on 10/16)    Thank you for the consult.    Adeel Smith, PharmD  10/14/2022 11:51 PM

## 2022-10-15 NOTE — PLAN OF CARE
Problem: Pain  Goal: Verbalizes/displays adequate comfort level or baseline comfort level  10/15/2022 1149 by Chelita Ervin RN  Outcome: Progressing     Problem: Safety - Adult  Goal: Free from fall injury  10/15/2022 1149 by Chelita Ervin RN  Outcome: Progressing     Problem: Discharge Planning  Goal: Discharge to home or other facility with appropriate resources  10/15/2022 1149 by Chelita Ervin RN  Outcome: Progressing

## 2022-10-15 NOTE — PROGRESS NOTES
Hospitalist Progress Note      PCP: Pcp No    Date of Admission: 10/13/2022    Chief Complaint: F/U for fall- dizzy in shower, injudry to ear and face (eyebrow); drinking before bed to help him sleep (7-8 drinks)    Hospital Course: 60 y.o. male with a history of multiple medical problems who presented to [facility] with falls. Has been drinking quite a bit of alcohol at night time. No hx of seizures. He's been passing out and falling down recently. Peggy Elliottre in the shower 2 days ago and cut himself rather badly. Waited till now to come in to ED for evaluation. No other complaints. ENT consulted, will take to OR tomorrow to wash out right facial laceration and close. Subjective: Pt laying in bed. Wants to remove dressing to right ear because if teels too tight. Told him to leave in place until ENT sees him. Patient unhappy with this response. Asked for something to help him sleep at night. Melatonin was ineffective. Patient then thanked me for \"showing up\". Denied other concerns or needs. Assessment/Plan:    Recurrent falls likely 2/2 Alcohol abuse  - CIWA  - Thiamine  - IVF  - PT/OT evals  - social work for resources alcohol abuse  - urine drug screen and ETOH all neg     Alcohol abuse  - CIWA score if needed     Facial lacerations (eyebrow (R), eyelid (R); ear (R))  - ENT consulted, OR 10/14/22 for repair and washout   - Recommend IV abx with vanc and zosyn.  D/t rhabdo and risk for IQRA, will start with vanc, cefepime, and flagyl  - Bacitracin ointment  -Okay to DC per ENT, see note for dressing change orders     Rhabdomyolysis  - CK 6700 on admit-- improving to 2000  - Daily CK x3 checks  - LR @ 150 cc/h     Transaminitis  - With mild bilirubinemia, likely 2/2 alcohol abuse  - INR in AM  - Daily CMP     Lactic acidosis  - 4.2 on arrival, has not been rechecked, will order recheck  - Thiamine administration  - IVF as above    Active Hospital Problems    Diagnosis     Fall at home, initial encounter [[id number].[email or web address], Y92.009]      Priority: Medium    Frequent falls [R29.6]      Priority: Medium       Medications:  Reviewed    Infusion Medications    sodium chloride      sodium chloride      lactated ringers 150 mL/hr at 10/15/22 0849     Scheduled Medications    enoxaparin  40 mg SubCUTAneous Daily    vancomycin  1,500 mg IntraVENous Q12H    thiamine  100 mg Oral Daily    nicotine  1 patch TransDERmal Daily    sodium chloride flush  5-40 mL IntraVENous 2 times per day    metroNIDAZOLE  500 mg IntraVENous Q8H    cefepime  2,000 mg IntraVENous Q12H    vancomycin (VANCOCIN) intermittent dosing (placeholder)   Other RX Placeholder     PRN Meds: melatonin, sodium chloride, LORazepam **OR** LORazepam **OR** LORazepam **OR** LORazepam **OR** LORazepam **OR** LORazepam **OR** LORazepam **OR** LORazepam, promethazine, promethazine, sodium chloride flush, sodium chloride, polyethylene glycol, acetaminophen **OR** acetaminophen      Intake/Output Summary (Last 24 hours) at 10/15/2022 1110  Last data filed at 10/15/2022 0428  Gross per 24 hour   Intake 2567.51 ml   Output 425 ml   Net 2142.51 ml       Physical Exam Performed:    /73   Pulse 72   Temp 97.8 °F (36.6 °C) (Oral)   Resp 16   Ht 5' 8\" (1.727 m)   Wt 181 lb (82.1 kg)   SpO2 97%   BMI 27.52 kg/m²     General appearance: No apparent distress, appears stated age and cooperative. HEENT: Pupils equal, round, and reactive to light. Conjunctivae/corneas clear. Neck: Supple, with full range of motion. No jugular venous distention. Trachea midline. Respiratory:  Normal respiratory effort. Clear to auscultation, bilaterally without Rales/Wheezes/Rhonchi. Cardiovascular: Regular rate and rhythm with normal S1/S2 without murmurs, rubs or gallops. Abdomen: Soft, non-tender, non-distended with normal bowel sounds. Musculoskeletal: No clubbing, cyanosis or edema bilaterally. Full range of motion without deformity.   Skin: Skin color, texture, turgor normal.  No rashes or lesions. Dressing to right ear and right eyebrow  Neurologic:  Neurovascularly intact without any focal sensory/motor deficits. Cranial nerves: II-XII intact, grossly non-focal.  Psychiatric: Alert and oriented, thought content appropriate, normal insight  Capillary Refill: Brisk,< 3 seconds   Peripheral Pulses: +2 palpable, equal bilaterally       Labs:   Recent Labs     10/13/22  1241 10/14/22  0844 10/15/22  0607   WBC 13.0* 8.7 8.0   HGB 16.6 14.0 12.9*   HCT 47.3 40.1* 37.5*   * 98* 83*     Recent Labs     10/13/22  1241 10/14/22  0844 10/15/22  0607   * 140 136   K 4.2 3.7 3.8   CL 95* 107 101   CO2 22 23 21   BUN 10 9 11   CREATININE 0.6* 0.5* <0.5*   CALCIUM 9.7 8.4 8.3     Recent Labs     10/13/22  1241 10/13/22  1516 10/15/22  0607   *  --  139*   ALT 86*  --  65*   BILIDIR  --  0.4*  --    BILITOT 1.8* 1.5* 0.9   ALKPHOS 83  --  62     No results for input(s): INR in the last 72 hours. Recent Labs     10/13/22  1241 10/13/22  1516 10/14/22  0844 10/15/22  0607   CKTOTAL  --  6,707* 4,287* 2,352*   TROPONINI <0.01  --   --   --        Urinalysis:      Lab Results   Component Value Date/Time    NITRU Negative 10/13/2022 03:16 PM    WBCUA 0 10/13/2022 03:16 PM    BACTERIA None Seen 10/13/2022 03:16 PM    RBCUA 0-2 10/13/2022 03:16 PM    BLOODU SMALL 10/13/2022 03:16 PM    SPECGRAV 1.006 10/13/2022 03:16 PM    GLUCOSEU Negative 10/13/2022 03:16 PM       Radiology:  CT CHEST ABDOMEN PELVIS W CONTRAST Additional Contrast? None   Final Result   No acute abnormalities identified in the chest, abdomen or pelvis. Fusiform aneurysmal dilation of the infrarenal abdominal aorta with eccentric   thrombus formation. The aneurysm measures approximately 3.7 cm in diameter. RECOMMENDATIONS:   For management of fusiform AAA:      3.5-3.9 cm AAA, recommend follow-up every 2 years.       Note: Recommend Vascular consultation if a fusiform AAA enlarges by >0.5 cm   in 6 months or >1 cm in 1 year or for a saccular AAA of any size. References: Connie Janette Radiol 2013; 5065):138-723; J Vasc Surg. 2018; 67:2-77         CT HEAD WO CONTRAST   Preliminary Result   No evidence of acute intracranial abnormality. CT CERVICAL SPINE WO CONTRAST   Final Result   No acute abnormality of the cervical spine. CT FACIAL BONES WO CONTRAST   Preliminary Result   1. No radiographic finding to suggest presence of acute facial bone fracture. 2. Findings suggestive of presence of multiple dental caries. Abnormal   lucency surrounding multiple teeth particularly the maxillary teeth may be on   the basis of underlying periodontal disease as described above. XR CHEST PORTABLE   Final Result   Minimal scarring/atelectatic changes seen in the lateral aspect of the left   lower lung field. No confluent airspace opacity seen. DVT Prophylaxis: Lovenox  Diet: ADULT DIET; Regular  Code Status: Full Code    PT/OT Eval Status: No acute needs    Dispo -home once medically stable, likely tomorrow    ARMAND Corcoran - CNP      NOTE:  This report was transcribed using voice recognition software. Every effort was made to ensure accuracy; however, inadvertent computerized transcription errors may be present.

## 2022-10-15 NOTE — PLAN OF CARE
Problem: Discharge Planning  Goal: Discharge to home or other facility with appropriate resources  10/15/2022 0136 by Angela Farah RN  Outcome: Progressing  10/14/2022 1909 by Molly Kirkland RN  Outcome: Progressing     Problem: Pain  Goal: Verbalizes/displays adequate comfort level or baseline comfort level  10/15/2022 0136 by Angela Farah RN  Outcome: Progressing  10/14/2022 1909 by Molly Kirkland RN  Outcome: Progressing     Problem: Safety - Adult  Goal: Free from fall injury  10/15/2022 0136 by Angela Farah RN  Outcome: Progressing  10/14/2022 1909 by Molly Kirkland RN  Outcome: Progressing

## 2022-10-16 VITALS
HEIGHT: 68 IN | HEART RATE: 66 BPM | OXYGEN SATURATION: 97 % | TEMPERATURE: 97.3 F | WEIGHT: 179.45 LBS | SYSTOLIC BLOOD PRESSURE: 130 MMHG | DIASTOLIC BLOOD PRESSURE: 78 MMHG | BODY MASS INDEX: 27.2 KG/M2 | RESPIRATION RATE: 16 BRPM

## 2022-10-16 LAB
CHOLESTEROL, TOTAL: 148 MG/DL (ref 0–199)
HDLC SERPL-MCNC: 61 MG/DL (ref 40–60)
LACTIC ACID: 0.8 MMOL/L (ref 0.4–2)
LDL CHOLESTEROL CALCULATED: 71 MG/DL
TOTAL CK: 1363 U/L (ref 39–308)
TRIGL SERPL-MCNC: 82 MG/DL (ref 0–150)
VANCOMYCIN RANDOM: 7 UG/ML
VLDLC SERPL CALC-MCNC: 16 MG/DL

## 2022-10-16 PROCEDURE — 36415 COLL VENOUS BLD VENIPUNCTURE: CPT

## 2022-10-16 PROCEDURE — 80202 ASSAY OF VANCOMYCIN: CPT

## 2022-10-16 PROCEDURE — 6370000000 HC RX 637 (ALT 250 FOR IP): Performed by: STUDENT IN AN ORGANIZED HEALTH CARE EDUCATION/TRAINING PROGRAM

## 2022-10-16 PROCEDURE — 2580000003 HC RX 258

## 2022-10-16 PROCEDURE — 80061 LIPID PANEL: CPT

## 2022-10-16 PROCEDURE — 2500000003 HC RX 250 WO HCPCS: Performed by: STUDENT IN AN ORGANIZED HEALTH CARE EDUCATION/TRAINING PROGRAM

## 2022-10-16 PROCEDURE — 2580000003 HC RX 258: Performed by: STUDENT IN AN ORGANIZED HEALTH CARE EDUCATION/TRAINING PROGRAM

## 2022-10-16 PROCEDURE — 6360000002 HC RX W HCPCS: Performed by: NURSE PRACTITIONER

## 2022-10-16 PROCEDURE — 6360000002 HC RX W HCPCS

## 2022-10-16 PROCEDURE — 82550 ASSAY OF CK (CPK): CPT

## 2022-10-16 PROCEDURE — 83605 ASSAY OF LACTIC ACID: CPT

## 2022-10-16 RX ORDER — THIAMINE MONONITRATE (VIT B1) 100 MG
100 TABLET ORAL DAILY
Qty: 30 TABLET | Refills: 0 | Status: SHIPPED | OUTPATIENT
Start: 2022-10-16

## 2022-10-16 RX ORDER — AMOXICILLIN AND CLAVULANATE POTASSIUM 875; 125 MG/1; MG/1
1 TABLET, FILM COATED ORAL 2 TIMES DAILY
Qty: 14 TABLET | Refills: 0 | Status: SHIPPED | OUTPATIENT
Start: 2022-10-16 | End: 2022-10-23

## 2022-10-16 RX ORDER — GREEN TEA/HOODIA GORDONII 315-12.5MG
1 CAPSULE ORAL 2 TIMES DAILY
Qty: 28 TABLET | Refills: 0 | Status: SHIPPED | OUTPATIENT
Start: 2022-10-16 | End: 2022-10-30

## 2022-10-16 RX ORDER — GINSENG 100 MG
CAPSULE ORAL
Qty: 2 EACH | Refills: 0 | Status: SHIPPED | OUTPATIENT
Start: 2022-10-16 | End: 2022-10-26

## 2022-10-16 RX ORDER — CIPROFLOXACIN 500 MG/1
500 TABLET, FILM COATED ORAL 2 TIMES DAILY
Qty: 14 TABLET | Refills: 0 | Status: SHIPPED | OUTPATIENT
Start: 2022-10-16 | End: 2022-10-23

## 2022-10-16 RX ADMIN — Medication 100 MG: at 08:50

## 2022-10-16 RX ADMIN — METRONIDAZOLE 500 MG: 500 INJECTION, SOLUTION INTRAVENOUS at 06:20

## 2022-10-16 RX ADMIN — ENOXAPARIN SODIUM 40 MG: 100 INJECTION SUBCUTANEOUS at 08:50

## 2022-10-16 RX ADMIN — SODIUM CHLORIDE, POTASSIUM CHLORIDE, SODIUM LACTATE AND CALCIUM CHLORIDE: 600; 310; 30; 20 INJECTION, SOLUTION INTRAVENOUS at 06:19

## 2022-10-16 RX ADMIN — CEFEPIME 2000 MG: 2 INJECTION, POWDER, FOR SOLUTION INTRAVENOUS at 03:40

## 2022-10-16 RX ADMIN — Medication 10 ML: at 08:53

## 2022-10-16 RX ADMIN — BACITRACIN: 500 OINTMENT TOPICAL at 08:50

## 2022-10-16 NOTE — DISCHARGE SUMMARY
Hospital Medicine Discharge Summary    Patient ID: Yesika Urbina      Patient's PCP: Pcp No    Admit Date: 10/13/2022     Discharge Date: 10/16/2022      Admitting Physician: Ranulfo Avalos MD     Discharge Physician: ARMAND Montes - CNP     Discharge Diagnoses  Recurrent falls secondary to alcohol abuse  Alcohol abuse  Facial lacerations to right eyebrow, right eyelid, right ear  Rhabdomyolysis  Transaminitis  Lactic acidosis  Fusiform infrarenal abdominal aorta aneurysm      Hospital Course:  60 y.o. male with a history of multiple medical problems who presented to [facility] with falls. Has been drinking quite a bit of alcohol at night time. No hx of seizures. He's been passing out and falling down recently. Tamea Sprung in the shower 2 days ago and cut himself rather badly. Waited till now to come in to ED for evaluation. No other complaints. ENT consulted, will take to OR tomorrow to wash out right facial laceration and close. Recurrent falls likely 2/2 Alcohol abuse  - Thiamine  - PT/OT evals-okay to discharge to home setting  - social work for resources alcohol abuse  - urine drug screen and ETOH all neg     Alcohol abuse  -Withdrawal symptoms very minimal, only mild hand tremor. Alert and oriented, steady on feet. -Encouraged alcohol cessation     Facial lacerations (eyebrow (R), eyelid (R); ear (R))  - ENT consulted, OR 10/14/22 for repair and washout   -Treated with IV Vanco, cefepime, Flagyl. -ENT recommended Augmentin and Cipro for 1 week at discharge. Added probiotic. Prescriptions provided. - Bacitracin ointment twice daily to lacerations. With twice daily dressing changes. Follow-up with ENT in 1 week.      Rhabdomyolysis  - CK 6700 on admit--improved to 1300  -Treated with IV fluids  -Continue good hydration at home      Transaminitis  - With mild bilirubinemia,  2/2 alcohol abuse  -Trending down     Lactic acidosis  - 4.2 on arrival, resolved now  -Treated with IV fluids    Fusiform infrarenal abdominal aorta aneurysm  -Seen on CT abdomen pelvis. Measures 3.7 cm in diameter.  -Recommend recheck in 2 years  -PCP to follow      Patient stated they felt well and wanted to go home. Patient denied chest pain, shortness of breath, palpitations, abdominal pain, nausea vomiting, diarrhea, dysuria, headache lightheadedness or dizziness. Appetite good. Voiding without difficulty. Reviewed plan of care with patient, verbalized understanding and agreement. Denied further questions or needs. Sister came to bedside, requested I read round on patient to review plan of care with her. Did review round, reviewed plan of care and discharge instructions with the patient again and his sister. They verbalized understanding and agreement. Physical Exam Performed:     /78   Pulse 66   Temp 97.3 °F (36.3 °C) (Oral)   Resp 16   Ht 5' 8\" (1.727 m)   Wt 179 lb 7.3 oz (81.4 kg)   SpO2 97%   BMI 27.29 kg/m²       General appearance:  No apparent distress, appears stated age and cooperative. HEENT:  Normal cephalic, atraumatic without obvious deformity. Pupils equal, round, and reactive to light. Extra ocular muscles intact. Conjunctivae/corneas clear. Neck: Supple, with full range of motion. No jugular venous distention. Trachea midline. Respiratory:  Normal respiratory effort. Clear to auscultation, bilaterally without Rales/Wheezes/Rhonchi. Cardiovascular:  Regular rate and rhythm with normal S1/S2 without murmurs, rubs or gallops. Abdomen: Soft, non-tender, non-distended with normal bowel sounds. Musculoskeletal:  No clubbing, cyanosis or edema bilaterally. Full range of motion without deformity. Skin: Skin color, texture, turgor normal.  No rashes or lesions. Dressing to right ear and right eyebrow dry and intact  Neurologic:  Neurovascularly intact without any focal sensory/motor deficits.  Cranial nerves: II-XII intact, grossly non-focal.  Psychiatric:  Alert and oriented, thought content appropriate, normal insight  Capillary Refill: Brisk,< 3 seconds   Peripheral Pulses: +2 palpable, equal bilaterally       Labs: For convenience and continuity at follow-up the following most recent labs are provided:      CBC:    Lab Results   Component Value Date/Time    WBC 8.0 10/15/2022 06:07 AM    HGB 12.9 10/15/2022 06:07 AM    HCT 37.5 10/15/2022 06:07 AM    PLT 83 10/15/2022 06:07 AM       Renal:    Lab Results   Component Value Date/Time     10/15/2022 06:07 AM    K 3.8 10/15/2022 06:07 AM     10/15/2022 06:07 AM    CO2 21 10/15/2022 06:07 AM    BUN 11 10/15/2022 06:07 AM    CREATININE <0.5 10/15/2022 06:07 AM    CALCIUM 8.3 10/15/2022 06:07 AM         Significant Diagnostic Studies    Radiology:   CT CHEST ABDOMEN PELVIS W CONTRAST Additional Contrast? None   Final Result   No acute abnormalities identified in the chest, abdomen or pelvis. Fusiform aneurysmal dilation of the infrarenal abdominal aorta with eccentric   thrombus formation. The aneurysm measures approximately 3.7 cm in diameter. RECOMMENDATIONS:   For management of fusiform AAA:      3.5-3.9 cm AAA, recommend follow-up every 2 years. Note: Recommend Vascular consultation if a fusiform AAA enlarges by >0.5 cm   in 6 months or >1 cm in 1 year or for a saccular AAA of any size. References: Huyen Grain Radiol 2013; 80(72):068-162; J Vasc Surg. 2018; 67:2-77         CT HEAD WO CONTRAST   Final Result   No evidence of acute intracranial abnormality. CT CERVICAL SPINE WO CONTRAST   Final Result   No acute abnormality of the cervical spine. CT FACIAL BONES WO CONTRAST   Final Result   1. No radiographic finding to suggest presence of acute facial bone fracture. 2. Findings suggestive of presence of multiple dental caries. Abnormal   lucency surrounding multiple teeth particularly the maxillary teeth may be on   the basis of underlying periodontal disease as described above. XR CHEST PORTABLE   Final Result   Minimal scarring/atelectatic changes seen in the lateral aspect of the left   lower lung field. No confluent airspace opacity seen. Consults:     IP CONSULT TO OTOLARYNGOLOGY  PHARMACY TO DOSE VANCOMYCIN  IP CONSULT TO SOCIAL WORK  IP CONSULT TO SOCIAL WORK    Disposition: Home    Condition at Discharge: Stable    Discharge Instructions/Follow-up:      Plan per ENT  -1 week of broad-spectrum antibiotics with Augmentin and ciprofloxacin  -Follow-up in 1 week  -Bacitracin ointment to incisions twice a day  -May remove head wrap to change gauze dressing and apply bacitracin as needed. Follow up with pcp in 1 week  Stop drinking alcohol    Code Status:  Prior     Activity: activity as tolerated    Diet: regular diet      Discharge Medications:     Discharge Medication List as of 10/16/2022 10:29 AM             Details   thiamine mononitrate (THIAMINE) 100 MG tablet Take 1 tablet by mouth daily, Disp-30 tablet, R-0Normal      amoxicillin-clavulanate (AUGMENTIN) 875-125 MG per tablet Take 1 tablet by mouth 2 times daily for 7 days, Disp-14 tablet, R-0Normal      ciprofloxacin (CIPRO) 500 MG tablet Take 1 tablet by mouth 2 times daily for 7 days, Disp-14 tablet, R-0Normal      bacitracin 500 UNIT/GM ointment Apply topically to lacerations 2 times daily. , Disp-2 each, R-0, Normal      Probiotic Acidophilus (FLORANEX) TABS Take 1 tablet by mouth 2 times daily for 14 days, Disp-28 tablet, R-0Normal             Time Spent on discharge is more than 30 minutes in the examination, evaluation, counseling and review of medications and discharge plan. Signed: ARMAND Yung - CNP   10/17/2022    The patient was seen and examined on day of discharge and this discharge summary is in conjunction with any daily progress note from day of discharge. Thank you Pcp No for the opportunity to be involved in this patient's care.  If you have any questions or concerns please feel free to contact me at [phone]. NOTE:  This report was transcribed using voice recognition software. Every effort was made to ensure accuracy; however, inadvertent computerized transcription errors may be present.

## 2022-10-16 NOTE — PLAN OF CARE
Problem: Pain  Goal: Verbalizes/displays adequate comfort level or baseline comfort level  10/16/2022 1022 by Malina Lynch RN  Outcome: Progressing     Problem: Safety - Adult  Goal: Free from fall injury  10/16/2022 1022 by Malina Lynch RN  Outcome: Progressing     Problem: Discharge Planning  Goal: Discharge to home or other facility with appropriate resources  10/16/2022 1022 by Malina Lynch RN  Outcome: Progressing

## 2022-10-16 NOTE — PROGRESS NOTES
Clinical Pharmacy Note  Vancomycin Consult    Cheikh Obando is a 60 y.o. male ordered Vancomycin for skin/soft tissue infection; consult received from Dr. Aziza Villa to manage therapy. Also receiving cefepime. Allergies:  Patient has no known allergies. Temp max:  Temp (24hrs), Av.4 °F (36.9 °C), Min:97.3 °F (36.3 °C), Max:99.5 °F (37.5 °C)      Recent Labs     10/13/22  1241 10/14/22  0844 10/15/22  0607   WBC 13.0* 8.7 8.0         Recent Labs     10/13/22  1241 10/14/22  0844 10/15/22  0607   BUN 10 9 11   CREATININE 0.6* 0.5* <0.5*           Intake/Output Summary (Last 24 hours) at 10/16/2022 1033  Last data filed at 10/15/2022 1803  Gross per 24 hour   Intake 1800 ml   Output --   Net 1800 ml         Culture Results:  pending    Ht Readings from Last 1 Encounters:   10/14/22 5' 8\" (1.727 m)        Wt Readings from Last 1 Encounters:   10/16/22 179 lb 7.3 oz (81.4 kg)         Estimated Creatinine Clearance: 146 mL/min (based on SCr of 0.5 mg/dL). Assessment/Plan:  Day # 4 of vancomycin.   Current regimen: 1500 mg every 12 hours  Vancomycin level: 7 mg/L (20 hours post dose)  Extrapolated AUC = 481 with current regimen    Continue Vancomycin 1,500 mg IVPB Q12H as ordered    Jean Garcia RP, PharmD, BCPS  10/16/2022 10:34 AM

## 2022-10-16 NOTE — PROGRESS NOTES
Dressing to right ear changed, ointment applied. Minimal drainage, and patient tolerated very well, denied pain.

## 2022-10-16 NOTE — DISCHARGE INSTRUCTIONS
Plan per ENT  -1 week of broad-spectrum antibiotics with Augmentin and ciprofloxacin  -Follow-up in 1 week  -Bacitracin ointment to incisions twice a day  -May remove head wrap to change gauze dressing and apply bacitracin as needed.             Follow up with pcp in 1 week  Stop drinking alcohol

## 2022-10-16 NOTE — PROGRESS NOTES
Patient and sister Maricruz Abel given verbal and written discharge instructions. Sister demonstrates proper dressing change wound care,to right ear and right eye. Sister to provide ride home.

## 2022-10-16 NOTE — ACP (ADVANCE CARE PLANNING)
Advance Care Planning     Advance Care Planning Activator (Inpatient)  Conversation Note      Date of ACP Conversation: 10/16/2022     Conversation Conducted with: Patient with Decision Making Capacity    ACP Activator: Oscar Meng RN    Health Care Decision Maker:     Current Designated Health Care Decision Maker:     Primary Decision Maker: Sita Turcios - Brother/Sister - [phone]    Care Preferences    Ventilation: \"If you were in your present state of health and suddenly became very ill and were unable to breathe on your own, what would your preference be about the use of a ventilator (breathing machine) if it were available to you? \"      Would the patient desire the use of ventilator (breathing machine)?: yes    \"If your health worsens and it becomes clear that your chance of recovery is unlikely, what would your preference be about the use of a ventilator (breathing machine) if it were available to you? \"     Would the patient desire the use of ventilator (breathing machine)?: Unsure      Resuscitation  \"CPR works best to restart the heart when there is a sudden event, like a heart attack, in someone who is otherwise healthy. Unfortunately, CPR does not typically restart the heart for people who have serious health conditions or who are very sick. \"    \"In the event your heart stopped as a result of an underlying serious health condition, would you want attempts to be made to restart your heart (answer \"yes\" for attempt to resuscitate) or would you prefer a natural death (answer \"no\" for do not attempt to resuscitate)? \" yes       [] Yes   [x] No   Educated Patient / Patricia Miguel regarding differences between Advance Directives and portable DNR orders.     Length of ACP Conversation in minutes:  5    Conversation Outcomes:  [x] ACP discussion completed  [] Existing advance directive reviewed with patient; no changes to patient's previously recorded wishes  [] New Advance Directive completed  [] Portable Do Not Rescitate prepared for Provider review and signature  [] POLST/POST/MOLST/MOST prepared for Provider review and signature      Follow-up plan:    [] Schedule follow-up conversation to continue planning  [] Referred individual to Provider for additional questions/concerns   [] Advised patient/agent/surrogate to review completed ACP document and update if needed with changes in condition, patient preferences or care setting    [] This note routed to one or more involved healthcare providers    Mahin ANTONY RN  Case Management  [phone]    Electronically signed by Mahin Frederick RN on 10/16/2022 at 11:19 AM

## 2022-10-16 NOTE — DISCHARGE INSTR - DIET

## 2022-10-16 NOTE — CARE COORDINATION
CASE MANAGEMENT DISCHARGE SUMMARY: Discharge order noted. CM met with the patient and sister to discuss discharge needs. ETOH outpatient rehab resources provided with [facility] business card attached. VM message left for [facility]. Unable to arrange home healthcare services for the patient due to lack of PCP. Sister states she is very comfortable with the dressing changes BID to his right ear. [address] PCP list provided.      DISCHARGE DATE: 10/16/22    DISCHARGED TO HOME     TRANSPORTATION: Sister             TIME: Afternoon     PREFERRED PHARMACY: [address] [place] [address] HORTENCIAN, RN  Case Management  [phone]             Electronically signed by Santos Diop RN on 10/16/2022 at 11:20 AM

## 2022-10-16 NOTE — CARE COORDINATION
INITIAL CASE MANAGEMENT ASSESSMENT    Met with patient to assess possible discharge needs. Explained Case Management role/services. Living Situation: Lives alone in an apartment. ADLs: Independent     DME: N/A    PT/OT Recs: No recommendations at this time. Active Services: N/A     Transportation: Active . Sister will provide transportation at discharge. Medications: [facility]     PCP: Does not have a PCP      HD/PD: N/A    PLAN/COMMENTS: Discharge plan is to discharge to his sister's home. She will be providing the wound care to his ear and forehead. Will require [facility] outpatient resources and [facility] PCP resources. Sister will provide transportation at discharge. Provided contact information for patient or family to call with any questions. Will follow and assist as needed.     Nithya ANTONY RN  Case Management  [phone]    Electronically signed by Nithya Castañeda RN on 10/16/2022 at 11:14 AM

## 2022-10-17 ENCOUNTER — TELEPHONE (OUTPATIENT)
Dept: ENT CLINIC | Age: 62
End: 2022-10-17

## 2022-10-17 NOTE — PROGRESS NOTES
Physician Progress Note      Douglas GORDON  CSN #:                  [id number]  :                       1960  ADMIT DATE:       10/13/2022 11:14 AM  DISCH DATE:        10/16/2022 12:40 PM  RESPONDING  PROVIDER #:        Jason Kovacs MD          QUERY TEXT:    Patient admitted with fall related to ETOH abuse. Per procedure note dated   10/14 documentation of laceration repair of right ear. To accurately reflect   the procedure performed please document the deepest depth of laceration which   was repaired: The medical record reflects the following:  Risk Factors: s/p fall - right ear laceration  Clinical Indicators: per procedure note, \"Attention was then turned to the   right ear. This was a nonlinear laceration, but rather stellate. There was   nonviable conchal and helical cartilage removed. There was also nonviable   skin of the fawn and helix removed. The wound edges were freshened. The   wound was then closed multiple layers. \"  Treatment: ENT consult, repair of laceration, ABX  Options provided:  -- Laceration repair of skin  -- Laceration repair of subcutaneous tissue  -- Laceration repair of fascia  -- Laceration repair of muscle  -- Other - I will add my own diagnosis  -- Disagree - Not applicable / Not valid  -- Disagree - Clinically unable to determine / Unknown  -- Refer to Clinical Documentation Reviewer    PROVIDER RESPONSE TEXT:    Addendum to procedure note: Laceration repair of muscle of right ear was   performed during procedure on .     Query created by: Vinicio James on 10/17/2022 11:19 AM      Electronically signed by:  Jason Kovacs MD 10/17/2022 11:24 AM

## 2022-10-17 NOTE — PROGRESS NOTES
Physician Progress Note      PATIENTNolia Closs DAVID  CSN #:                  [id number]  :                       1960  ADMIT DATE:       10/13/2022 11:14 AM  DISCH DATE:        10/16/2022 12:40 PM  RESPONDING  PROVIDER #:        Lashon Narayanan MD          QUERY TEXT:    Patient admitted with . Per Op note dated 10/14 documentation of debridement   of right brow. To accurately reflect the procedure performed please document   if debridement was excisional or nonexcisional and the deepest depth of tissue   removed as down to and including: The medical record reflects the following:  Risk Factors: ETOH abuse s/p fall  Clinical Indicators: per op note, \"Attention was first turned to the right   brow laceration. The wound was irrigated further. There was some nonviable   wound edges that were debrided. The laceration extended to the frontal bone   superiorly. More medially the frontalis muscle appeared to be intact. The   laceration approached the orbital septum, but did not violate it. There was   no evidence of orbital fat. \"  Treatment: debridement, ABX, bacitracin, ENT consult  Options provided:  -- Nonexcisional debridement of skin  -- Excisional debridement of skin  -- Nonexcisional debridement of subcutaneous tissue  -- Excisional debridement of subcutaneous tissue  -- Nonexcisional debridement of fascia  -- Excisional debridement of fascia  -- Nonexcisional debridement of muscle  -- Excisional debridement of muscle  -- Nonexcisional debridement of bone  -- Excisional debridement of bone  -- Other - I will add my own diagnosis  -- Disagree - Not applicable / Not valid  -- Disagree - Clinically unable to determine / Unknown  -- Refer to Clinical Documentation Reviewer    PROVIDER RESPONSE TEXT:    Excisional debridement of skin was performed of right forehead, through the   right brow and into the right eyelidduring procedure on .     Query created by: Bard Simmons on 10/17/2022 11:14 AM      Electronically signed by:  Lazara East MD 10/17/2022 11:18 AM

## 2022-10-17 NOTE — TELEPHONE ENCOUNTER
----- Message from Deepthi Looney MD sent at 10/14/2022  3:58 PM EDT -----  Regarding: appointment  Patient is a follow-up next Thursday or Friday. He is currently admitted.

## 2022-10-20 ENCOUNTER — OFFICE VISIT (OUTPATIENT)
Dept: ENT CLINIC | Age: 62
End: 2022-10-20

## 2022-10-20 VITALS — WEIGHT: 183 LBS | BODY MASS INDEX: 27.74 KG/M2 | HEIGHT: 68 IN

## 2022-10-20 DIAGNOSIS — S09.93XD FACIAL INJURY, SUBSEQUENT ENCOUNTER: Primary | ICD-10-CM

## 2022-10-20 PROCEDURE — 99024 POSTOP FOLLOW-UP VISIT: CPT | Performed by: OTOLARYNGOLOGY

## 2022-10-20 NOTE — PROGRESS NOTES
The patient is following up for his facial trauma. I took him to the operating room on October 14, 2022 for delayed closure of her right ear and right brow laceration. He had waited 48 hours before presenting after the trauma. Overall he is doing well. Feels as though the brow is healing well. Exam  The brow laceration is healing very well. He seems to have normal mobility of his brow. No evidence of eye involvement. The patient's right ear is still very swollen and erythematous. There is a little bit of exposed cartilage superiorly. There is not seem to be significant purulent drainage when I palpate it. It does appear that everything is viable at this point. Plan  Overall patient doing well. The brow laceration is healing very well. The ear is at least viable at this point. I would continue to observe this. I do think he is going to have significant scarring and cauliflower ear, but I do not think further surgical intervention will prevent this. I would like to see him again in a week.

## 2022-10-27 ENCOUNTER — OFFICE VISIT (OUTPATIENT)
Dept: ENT CLINIC | Age: 62
End: 2022-10-27

## 2022-10-27 VITALS — HEIGHT: 68 IN | BODY MASS INDEX: 28.34 KG/M2 | WEIGHT: 187 LBS

## 2022-10-27 DIAGNOSIS — S09.93XD FACIAL INJURY, SUBSEQUENT ENCOUNTER: Primary | ICD-10-CM

## 2022-10-27 PROCEDURE — 99024 POSTOP FOLLOW-UP VISIT: CPT | Performed by: OTOLARYNGOLOGY

## 2022-10-27 NOTE — PROGRESS NOTES
Patient is following up for his right ear and right facial lacerations that are repaired on October 14, 2021. I last saw him on 20 October. He was healing well at that time. He says that he feels as though is continuing to heal well. He denies any significant pain associated with the ear or brow laceration    Exam  Brow laceration is healing very well. On the right ear he still has a lot of erythema without any significant tenderness or evidence of infection. There is a small amount of exposed cartilage of the superior helix. This is less than a week ago as there is some granulation tissue forming. Plan  Overall appears to be healing well. He had does not have signs of infection today. For now I do not think we would do anything. I would like to see him in 1 month. If he feels as though something is going wrong like an infection I want him to follow-up sooner.

## 2022-12-02 ENCOUNTER — OFFICE VISIT (OUTPATIENT)
Dept: ENT CLINIC | Age: 62
End: 2022-12-02

## 2022-12-02 VITALS — BODY MASS INDEX: 30.92 KG/M2 | WEIGHT: 204 LBS | HEIGHT: 68 IN

## 2022-12-02 DIAGNOSIS — S09.93XD FACIAL INJURY, SUBSEQUENT ENCOUNTER: Primary | ICD-10-CM

## 2022-12-02 NOTE — PROGRESS NOTES
Patient is following up for his closure of her right brow and right ear lacerations. I did this in October 2022. Overall I think things have healed up very well. Exam  Right brow incisions healed well. The right ear is still well as well. He does have some discoloration and cauliflower changes color of the superior aspect of the helix as expected. Plan  Overall he is actually healed very well. He does have some color changes and cauliflower changes of the right ear, but this is certainly expected given the complexity of the laceration. At this point I feel like he can follow-up as needed.

## 2024-12-02 ENCOUNTER — APPOINTMENT (OUTPATIENT)
Dept: GENERAL RADIOLOGY | Age: 64
DRG: 071 | End: 2024-12-02
Payer: MEDICARE

## 2024-12-02 ENCOUNTER — HOSPITAL ENCOUNTER (INPATIENT)
Age: 64
LOS: 10 days | Discharge: NURSING, REHAB OR LONG-TERM CARE | DRG: 071 | End: 2024-12-12
Payer: MEDICARE

## 2024-12-02 ENCOUNTER — APPOINTMENT (OUTPATIENT)
Dept: CT IMAGING | Age: 64
DRG: 071 | End: 2024-12-02
Payer: MEDICARE

## 2024-12-02 DIAGNOSIS — N30.00 ACUTE CYSTITIS WITHOUT HEMATURIA: ICD-10-CM

## 2024-12-02 DIAGNOSIS — R79.89 ELEVATED LFTS: ICD-10-CM

## 2024-12-02 DIAGNOSIS — R41.82 ALTERED MENTAL STATUS, UNSPECIFIED ALTERED MENTAL STATUS TYPE: Primary | ICD-10-CM

## 2024-12-02 DIAGNOSIS — G89.29 CHRONIC KNEE PAIN, UNSPECIFIED LATERALITY: ICD-10-CM

## 2024-12-02 DIAGNOSIS — E87.6 HYPOKALEMIA: ICD-10-CM

## 2024-12-02 DIAGNOSIS — Z87.898 HISTORY OF ALCOHOL USE: ICD-10-CM

## 2024-12-02 DIAGNOSIS — E86.0 DEHYDRATION: ICD-10-CM

## 2024-12-02 DIAGNOSIS — M25.569 CHRONIC KNEE PAIN, UNSPECIFIED LATERALITY: ICD-10-CM

## 2024-12-02 LAB
ALBUMIN SERPL-MCNC: 3.8 G/DL (ref 3.4–5)
ALBUMIN/GLOB SERPL: 1.2 {RATIO} (ref 1.1–2.2)
ALP SERPL-CCNC: 140 U/L (ref 40–129)
ALT SERPL-CCNC: 43 U/L (ref 10–40)
AMMONIA PLAS-SCNC: 24 UMOL/L (ref 16–60)
AMPHETAMINES UR QL SCN>1000 NG/ML: NORMAL
ANION GAP SERPL CALCULATED.3IONS-SCNC: 20 MMOL/L (ref 3–16)
AST SERPL-CCNC: 68 U/L (ref 15–37)
BACTERIA URNS QL MICRO: NORMAL /HPF
BARBITURATES UR QL SCN>200 NG/ML: NORMAL
BASOPHILS # BLD: 0 K/UL (ref 0–0.2)
BASOPHILS # BLD: 0 K/UL (ref 0–0.2)
BASOPHILS NFR BLD: 0.4 %
BASOPHILS NFR BLD: 0.4 %
BENZODIAZ UR QL SCN>200 NG/ML: NORMAL
BILIRUB SERPL-MCNC: 1.4 MG/DL (ref 0–1)
BILIRUB UR QL STRIP.AUTO: ABNORMAL
BUN SERPL-MCNC: 9 MG/DL (ref 7–20)
CALCIUM SERPL-MCNC: 9.5 MG/DL (ref 8.3–10.6)
CANNABINOIDS UR QL SCN>50 NG/ML: NORMAL
CHLORIDE SERPL-SCNC: 95 MMOL/L (ref 99–110)
CK SERPL-CCNC: 120 U/L (ref 39–308)
CLARITY UR: CLEAR
CO2 SERPL-SCNC: 26 MMOL/L (ref 21–32)
COCAINE UR QL SCN: NORMAL
COLOR UR: ABNORMAL
CREAT SERPL-MCNC: 0.7 MG/DL (ref 0.8–1.3)
DEPRECATED RDW RBC AUTO: 15.9 % (ref 12.4–15.4)
DEPRECATED RDW RBC AUTO: 15.9 % (ref 12.4–15.4)
DRUG SCREEN COMMENT UR-IMP: NORMAL
EOSINOPHIL # BLD: 0 K/UL (ref 0–0.6)
EOSINOPHIL # BLD: 0 K/UL (ref 0–0.6)
EOSINOPHIL NFR BLD: 0.2 %
EOSINOPHIL NFR BLD: 0.3 %
EPI CELLS #/AREA URNS AUTO: 0 /HPF (ref 0–5)
ETHANOLAMINE SERPL-MCNC: NORMAL MG/DL (ref 0–0.08)
FENTANYL SCREEN, URINE: NORMAL
GFR SERPLBLD CREATININE-BSD FMLA CKD-EPI: >90 ML/MIN/{1.73_M2}
GLUCOSE SERPL-MCNC: 102 MG/DL (ref 70–99)
GLUCOSE UR STRIP.AUTO-MCNC: NEGATIVE MG/DL
HCT VFR BLD AUTO: 46.1 % (ref 40.5–52.5)
HCT VFR BLD AUTO: 48.9 % (ref 40.5–52.5)
HGB BLD-MCNC: 15.7 G/DL (ref 13.5–17.5)
HGB BLD-MCNC: 16.9 G/DL (ref 13.5–17.5)
HGB UR QL STRIP.AUTO: NEGATIVE
HYALINE CASTS #/AREA URNS AUTO: 0 /LPF (ref 0–8)
KETONES UR STRIP.AUTO-MCNC: ABNORMAL MG/DL
LACTATE BLDV-SCNC: 2 MMOL/L (ref 0.4–1.9)
LACTATE BLDV-SCNC: 3.7 MMOL/L (ref 0.4–1.9)
LEUKOCYTE ESTERASE UR QL STRIP.AUTO: NEGATIVE
LYMPHOCYTES # BLD: 1 K/UL (ref 1–5.1)
LYMPHOCYTES # BLD: 1.5 K/UL (ref 1–5.1)
LYMPHOCYTES NFR BLD: 11.6 %
LYMPHOCYTES NFR BLD: 12.4 %
MAGNESIUM SERPL-MCNC: 1.96 MG/DL (ref 1.8–2.4)
MCH RBC QN AUTO: 33.5 PG (ref 26–34)
MCH RBC QN AUTO: 33.9 PG (ref 26–34)
MCHC RBC AUTO-ENTMCNC: 33.9 G/DL (ref 31–36)
MCHC RBC AUTO-ENTMCNC: 34.5 G/DL (ref 31–36)
MCV RBC AUTO: 98.2 FL (ref 80–100)
MCV RBC AUTO: 98.6 FL (ref 80–100)
METHADONE UR QL SCN>300 NG/ML: NORMAL
MONOCYTES # BLD: 0.8 K/UL (ref 0–1.3)
MONOCYTES # BLD: 1.3 K/UL (ref 0–1.3)
MONOCYTES NFR BLD: 10.7 %
MONOCYTES NFR BLD: 8.9 %
NEUTROPHILS # BLD: 6.7 K/UL (ref 1.7–7.7)
NEUTROPHILS # BLD: 9.1 K/UL (ref 1.7–7.7)
NEUTROPHILS NFR BLD: 76.3 %
NEUTROPHILS NFR BLD: 78.8 %
NITRITE UR QL STRIP.AUTO: POSITIVE
OPIATES UR QL SCN>300 NG/ML: NORMAL
OXYCODONE UR QL SCN: NORMAL
PCP UR QL SCN>25 NG/ML: NORMAL
PH UR STRIP.AUTO: 6 [PH] (ref 5–8)
PH UR STRIP: 5.5 [PH]
PLATELET # BLD AUTO: 126 K/UL (ref 135–450)
PLATELET # BLD AUTO: 139 K/UL (ref 135–450)
PLATELET BLD QL SMEAR: ABNORMAL
PMV BLD AUTO: 7 FL (ref 5–10.5)
PMV BLD AUTO: 7.1 FL (ref 5–10.5)
POTASSIUM SERPL-SCNC: 3.1 MMOL/L (ref 3.5–5.1)
PROT SERPL-MCNC: 6.9 G/DL (ref 6.4–8.2)
PROT UR STRIP.AUTO-MCNC: ABNORMAL MG/DL
RBC # BLD AUTO: 4.68 M/UL (ref 4.2–5.9)
RBC # BLD AUTO: 4.99 M/UL (ref 4.2–5.9)
RBC CLUMPS #/AREA URNS AUTO: 2 /HPF (ref 0–4)
REASON FOR REJECTION: NORMAL
REJECTED TEST: NORMAL
SLIDE REVIEW: ABNORMAL
SODIUM SERPL-SCNC: 141 MMOL/L (ref 136–145)
SP GR UR STRIP.AUTO: 1.02 (ref 1–1.03)
UA COMPLETE W REFLEX CULTURE PNL UR: ABNORMAL
UA DIPSTICK W REFLEX MICRO PNL UR: YES
URN SPEC COLLECT METH UR: ABNORMAL
UROBILINOGEN UR STRIP-ACNC: 4 E.U./DL
WBC # BLD AUTO: 12 K/UL (ref 4–11)
WBC # BLD AUTO: 8.5 K/UL (ref 4–11)
WBC #/AREA URNS AUTO: 1 /HPF (ref 0–5)

## 2024-12-02 PROCEDURE — 82140 ASSAY OF AMMONIA: CPT

## 2024-12-02 PROCEDURE — 83605 ASSAY OF LACTIC ACID: CPT

## 2024-12-02 PROCEDURE — 70450 CT HEAD/BRAIN W/O DYE: CPT

## 2024-12-02 PROCEDURE — 85025 COMPLETE CBC W/AUTO DIFF WBC: CPT

## 2024-12-02 PROCEDURE — 2580000003 HC RX 258

## 2024-12-02 PROCEDURE — 80053 COMPREHEN METABOLIC PANEL: CPT

## 2024-12-02 PROCEDURE — 6370000000 HC RX 637 (ALT 250 FOR IP)

## 2024-12-02 PROCEDURE — 93005 ELECTROCARDIOGRAM TRACING: CPT | Performed by: PHYSICIAN ASSISTANT

## 2024-12-02 PROCEDURE — 6360000002 HC RX W HCPCS

## 2024-12-02 PROCEDURE — 80307 DRUG TEST PRSMV CHEM ANLYZR: CPT

## 2024-12-02 PROCEDURE — 71045 X-RAY EXAM CHEST 1 VIEW: CPT

## 2024-12-02 PROCEDURE — 2580000003 HC RX 258: Performed by: PHYSICIAN ASSISTANT

## 2024-12-02 PROCEDURE — 72125 CT NECK SPINE W/O DYE: CPT

## 2024-12-02 PROCEDURE — 81001 URINALYSIS AUTO W/SCOPE: CPT

## 2024-12-02 PROCEDURE — 82077 ASSAY SPEC XCP UR&BREATH IA: CPT

## 2024-12-02 PROCEDURE — 82550 ASSAY OF CK (CPK): CPT

## 2024-12-02 PROCEDURE — 83735 ASSAY OF MAGNESIUM: CPT

## 2024-12-02 PROCEDURE — 1200000000 HC SEMI PRIVATE

## 2024-12-02 PROCEDURE — 87086 URINE CULTURE/COLONY COUNT: CPT

## 2024-12-02 PROCEDURE — 99285 EMERGENCY DEPT VISIT HI MDM: CPT

## 2024-12-02 PROCEDURE — 36415 COLL VENOUS BLD VENIPUNCTURE: CPT

## 2024-12-02 RX ORDER — SODIUM CHLORIDE 0.9 % (FLUSH) 0.9 %
5-40 SYRINGE (ML) INJECTION EVERY 12 HOURS SCHEDULED
Status: DISCONTINUED | OUTPATIENT
Start: 2024-12-02 | End: 2024-12-12 | Stop reason: HOSPADM

## 2024-12-02 RX ORDER — ENOXAPARIN SODIUM 100 MG/ML
40 INJECTION SUBCUTANEOUS DAILY
Status: DISCONTINUED | OUTPATIENT
Start: 2024-12-03 | End: 2024-12-12 | Stop reason: HOSPADM

## 2024-12-02 RX ORDER — 0.9 % SODIUM CHLORIDE 0.9 %
30 INTRAVENOUS SOLUTION INTRAVENOUS ONCE
Status: COMPLETED | OUTPATIENT
Start: 2024-12-02 | End: 2024-12-03

## 2024-12-02 RX ORDER — MAGNESIUM SULFATE IN WATER 40 MG/ML
2000 INJECTION, SOLUTION INTRAVENOUS PRN
Status: DISCONTINUED | OUTPATIENT
Start: 2024-12-02 | End: 2024-12-12 | Stop reason: HOSPADM

## 2024-12-02 RX ORDER — POTASSIUM CHLORIDE 750 MG/1
40 TABLET, EXTENDED RELEASE ORAL ONCE
Status: COMPLETED | OUTPATIENT
Start: 2024-12-02 | End: 2024-12-02

## 2024-12-02 RX ORDER — ONDANSETRON 4 MG/1
4 TABLET, ORALLY DISINTEGRATING ORAL EVERY 8 HOURS PRN
Status: DISCONTINUED | OUTPATIENT
Start: 2024-12-02 | End: 2024-12-12 | Stop reason: HOSPADM

## 2024-12-02 RX ORDER — SODIUM CHLORIDE 9 MG/ML
INJECTION, SOLUTION INTRAVENOUS PRN
Status: DISCONTINUED | OUTPATIENT
Start: 2024-12-02 | End: 2024-12-12 | Stop reason: HOSPADM

## 2024-12-02 RX ORDER — ONDANSETRON 2 MG/ML
4 INJECTION INTRAMUSCULAR; INTRAVENOUS EVERY 6 HOURS PRN
Status: DISCONTINUED | OUTPATIENT
Start: 2024-12-02 | End: 2024-12-12 | Stop reason: HOSPADM

## 2024-12-02 RX ORDER — ACETAMINOPHEN 650 MG/1
650 SUPPOSITORY RECTAL EVERY 6 HOURS PRN
Status: DISCONTINUED | OUTPATIENT
Start: 2024-12-02 | End: 2024-12-12 | Stop reason: HOSPADM

## 2024-12-02 RX ORDER — ACETAMINOPHEN 325 MG/1
650 TABLET ORAL EVERY 6 HOURS PRN
Status: DISCONTINUED | OUTPATIENT
Start: 2024-12-02 | End: 2024-12-12 | Stop reason: HOSPADM

## 2024-12-02 RX ORDER — POTASSIUM CHLORIDE 1500 MG/1
40 TABLET, EXTENDED RELEASE ORAL PRN
Status: DISCONTINUED | OUTPATIENT
Start: 2024-12-02 | End: 2024-12-12 | Stop reason: HOSPADM

## 2024-12-02 RX ORDER — SODIUM CHLORIDE 0.9 % (FLUSH) 0.9 %
5-40 SYRINGE (ML) INJECTION PRN
Status: DISCONTINUED | OUTPATIENT
Start: 2024-12-02 | End: 2024-12-12 | Stop reason: HOSPADM

## 2024-12-02 RX ORDER — POLYETHYLENE GLYCOL 3350 17 G/17G
17 POWDER, FOR SOLUTION ORAL DAILY PRN
Status: DISCONTINUED | OUTPATIENT
Start: 2024-12-02 | End: 2024-12-12 | Stop reason: HOSPADM

## 2024-12-02 RX ORDER — GAUZE BANDAGE 2" X 2"
100 BANDAGE TOPICAL DAILY
Status: DISCONTINUED | OUTPATIENT
Start: 2024-12-03 | End: 2024-12-05 | Stop reason: SDUPTHER

## 2024-12-02 RX ORDER — POTASSIUM CHLORIDE 7.45 MG/ML
10 INJECTION INTRAVENOUS PRN
Status: DISCONTINUED | OUTPATIENT
Start: 2024-12-02 | End: 2024-12-12 | Stop reason: HOSPADM

## 2024-12-02 RX ADMIN — WATER 1000 MG: 1 INJECTION INTRAMUSCULAR; INTRAVENOUS; SUBCUTANEOUS at 23:26

## 2024-12-02 RX ADMIN — POTASSIUM CHLORIDE 40 MEQ: 750 TABLET, EXTENDED RELEASE ORAL at 23:22

## 2024-12-02 RX ADMIN — SODIUM CHLORIDE 2769 ML: 9 INJECTION, SOLUTION INTRAVENOUS at 19:54

## 2024-12-02 ASSESSMENT — PAIN SCALES - GENERAL: PAINLEVEL_OUTOF10: 0

## 2024-12-02 ASSESSMENT — LIFESTYLE VARIABLES
HOW MANY STANDARD DRINKS CONTAINING ALCOHOL DO YOU HAVE ON A TYPICAL DAY: PATIENT UNABLE TO ANSWER
HOW OFTEN DO YOU HAVE A DRINK CONTAINING ALCOHOL: PATIENT UNABLE TO ANSWER

## 2024-12-02 NOTE — ED NOTES
Altered Mental Status (Pt comes in via [facility] with c/o altered mental status. Family present and and states that pt has been falling a lot this week and that pt is not eating and drinking well. Pts skin is dry, abdomen is distended, pts blood sugar per emt was 96. Pt has no c/o pain at this time. Pt is alert and oriented x2. )

## 2024-12-03 LAB
25(OH)D3 SERPL-MCNC: 6 NG/ML
ALBUMIN SERPL-MCNC: 3.2 G/DL (ref 3.4–5)
ALBUMIN/GLOB SERPL: 1.3 {RATIO} (ref 1.1–2.2)
ALP SERPL-CCNC: 106 U/L (ref 40–129)
ALT SERPL-CCNC: 33 U/L (ref 10–40)
ANION GAP SERPL CALCULATED.3IONS-SCNC: 13 MMOL/L (ref 3–16)
AST SERPL-CCNC: 49 U/L (ref 15–37)
BACTERIA UR CULT: NORMAL
BILIRUB SERPL-MCNC: 0.8 MG/DL (ref 0–1)
BUN SERPL-MCNC: 9 MG/DL (ref 7–20)
CALCIUM SERPL-MCNC: 8.2 MG/DL (ref 8.3–10.6)
CHLORIDE SERPL-SCNC: 101 MMOL/L (ref 99–110)
CO2 SERPL-SCNC: 26 MMOL/L (ref 21–32)
CREAT SERPL-MCNC: 0.6 MG/DL (ref 0.8–1.3)
GFR SERPLBLD CREATININE-BSD FMLA CKD-EPI: >90 ML/MIN/{1.73_M2}
GLUCOSE SERPL-MCNC: 94 MG/DL (ref 70–99)
MAGNESIUM SERPL-MCNC: 1.78 MG/DL (ref 1.8–2.4)
POTASSIUM SERPL-SCNC: 3.1 MMOL/L (ref 3.5–5.1)
PROT SERPL-MCNC: 5.6 G/DL (ref 6.4–8.2)
REASON FOR REJECTION: NORMAL
REJECTED TEST: NORMAL
SODIUM SERPL-SCNC: 140 MMOL/L (ref 136–145)
T4 FREE SERPL-MCNC: 0.8 NG/DL (ref 0.9–1.8)
TSH SERPL DL<=0.005 MIU/L-ACNC: 4.36 UIU/ML (ref 0.27–4.2)

## 2024-12-03 PROCEDURE — 6360000002 HC RX W HCPCS

## 2024-12-03 PROCEDURE — 1200000000 HC SEMI PRIVATE

## 2024-12-03 PROCEDURE — 82306 VITAMIN D 25 HYDROXY: CPT

## 2024-12-03 PROCEDURE — 36415 COLL VENOUS BLD VENIPUNCTURE: CPT

## 2024-12-03 PROCEDURE — 97530 THERAPEUTIC ACTIVITIES: CPT

## 2024-12-03 PROCEDURE — 51798 US URINE CAPACITY MEASURE: CPT

## 2024-12-03 PROCEDURE — 6370000000 HC RX 637 (ALT 250 FOR IP)

## 2024-12-03 PROCEDURE — 84443 ASSAY THYROID STIM HORMONE: CPT

## 2024-12-03 PROCEDURE — 6370000000 HC RX 637 (ALT 250 FOR IP): Performed by: STUDENT IN AN ORGANIZED HEALTH CARE EDUCATION/TRAINING PROGRAM

## 2024-12-03 PROCEDURE — 84439 ASSAY OF FREE THYROXINE: CPT

## 2024-12-03 PROCEDURE — 2580000003 HC RX 258

## 2024-12-03 PROCEDURE — 83735 ASSAY OF MAGNESIUM: CPT

## 2024-12-03 PROCEDURE — 80053 COMPREHEN METABOLIC PANEL: CPT

## 2024-12-03 PROCEDURE — 97162 PT EVAL MOD COMPLEX 30 MIN: CPT

## 2024-12-03 PROCEDURE — 97166 OT EVAL MOD COMPLEX 45 MIN: CPT

## 2024-12-03 PROCEDURE — 94760 N-INVAS EAR/PLS OXIMETRY 1: CPT

## 2024-12-03 PROCEDURE — 51701 INSERT BLADDER CATHETER: CPT

## 2024-12-03 RX ADMIN — ACETAMINOPHEN 650 MG: 325 TABLET ORAL at 19:28

## 2024-12-03 RX ADMIN — SODIUM CHLORIDE, PRESERVATIVE FREE 10 ML: 5 INJECTION INTRAVENOUS at 21:04

## 2024-12-03 RX ADMIN — POTASSIUM CHLORIDE 40 MEQ: 20 TABLET, EXTENDED RELEASE ORAL at 10:41

## 2024-12-03 RX ADMIN — ENOXAPARIN SODIUM 40 MG: 100 INJECTION SUBCUTANEOUS at 10:01

## 2024-12-03 RX ADMIN — WATER 1000 MG: 1 INJECTION INTRAMUSCULAR; INTRAVENOUS; SUBCUTANEOUS at 21:04

## 2024-12-03 RX ADMIN — Medication 100 MG: at 10:01

## 2024-12-03 RX ADMIN — SODIUM CHLORIDE, PRESERVATIVE FREE 10 ML: 5 INJECTION INTRAVENOUS at 00:42

## 2024-12-03 ASSESSMENT — PAIN DESCRIPTION - LOCATION: LOCATION: BACK

## 2024-12-03 ASSESSMENT — PAIN DESCRIPTION - DESCRIPTORS: DESCRIPTORS: ACHING

## 2024-12-03 ASSESSMENT — PAIN - FUNCTIONAL ASSESSMENT: PAIN_FUNCTIONAL_ASSESSMENT: ACTIVITIES ARE NOT PREVENTED

## 2024-12-03 ASSESSMENT — PAIN DESCRIPTION - ORIENTATION: ORIENTATION: LOWER

## 2024-12-03 ASSESSMENT — PAIN SCALES - GENERAL: PAINLEVEL_OUTOF10: 2

## 2024-12-03 NOTE — PROGRESS NOTES
K+ 3.1 - PRN replacement in MAR - orders were followed - see MAR.    Electronically signed by Yfn Carranza RN on 12/3/2024 at 10:48 AM

## 2024-12-03 NOTE — PROGRESS NOTES
4 Eyes Skin Assessment     NAME:  Chavez Hyatt  YOB: 1960  MEDICAL RECORD NUMBER:  [id number]    The patient is being assessed for  Admission    I agree that at least one RN has performed a thorough Head to Toe Skin Assessment on the patient. ALL assessment sites listed below have been assessed.      Areas assessed by both nurses:    Head, Face, Ears, Shoulders, Back, Chest, Arms, Elbows, Hands, Sacrum. Buttock, Coccyx, Ischium, Legs. Feet and Heels, and Under Medical Devices         Does the Patient have a Wound? No noted wound(s)       Chris Prevention initiated by RN: Yes  Wound Care Orders initiated by RN: No    Pressure Injury (Stage 3,4, Unstageable, DTI, NWPT, and Complex wounds) if present, place Wound referral order by RN under : No    New Ostomies, if present place, Ostomy referral order under : No     Nurse 1 eSignature: Electronically signed by Valeria Rivas RN on 12/3/24 at 2:41 AM EST    **SHARE this note so that the co-signing nurse can place an eSignature**    Nurse 2 eSignature: Electronically signed by Bethany Landaverde RN on 12/3/24 at 4:50 AM EST

## 2024-12-03 NOTE — ED PROVIDER NOTES
[facility] EMERGENCY DEPARTMENT  EMERGENCY DEPARTMENT ENCOUNTER        Pt Name: Chavez Hyatt  MRN: [id number]  Birthdate 1960  Date of evaluation: 12/2/2024  Provider: Shantell Uriarte PA-C  PCP: Gladys, Pcp  Note Started: 10:05 PM EST 12/2/24      ROSA. I have evaluated this patient.        CHIEF COMPLAINT       Chief Complaint   Patient presents with    Altered Mental Status     Pt comes in via [facility] with c/o altered mental status. Family present and and states that pt has been falling a lot this week and that pt is not eating and drinking well. Pts skin is dry, abdomen is distended, pts blood sugar per emt was 96. Pt has no c/o pain at this time. Pt is alert and oriented x2.        HISTORY OF PRESENT ILLNESS: 1 or more Elements             Chavez Hyatt is a 64 y.o. male who presents to the emergent department with change in mental status progressively worsening over the past week.  His family states that over the past month there has been progression in weakness but over the past week he has had change in mental status.  Over the past 4 days he has had 4 falls.  He has not been eating or drinking.  States no appetite.  Denies any fevers or chills, recent illness, chest pain shortness of breath or cough.    Nursing Notes were all reviewed and agreed with or any disagreements were addressed in the HPI.    REVIEW OF SYSTEMS :      Review of Systems   All other systems reviewed and are negative.      Positives and Pertinent negatives as per HPI.     SURGICAL HISTORY     Past Surgical History:   Procedure Laterality Date    FACIAL SURGERY Right 10/14/2022    COMPLEX CLOSURE OF RIGHT FACE AND EAR DEFECT performed by Sage Wallace MD at [facility] OR       CURRENTMEDICATIONS       Previous Medications    THIAMINE MONONITRATE (THIAMINE) 100 MG TABLET    Take 1 tablet by mouth daily       ALLERGIES     Patient has no known allergies.    FAMILYHISTORY     History reviewed. No pertinent  family history.     SOCIAL HISTORY       Social History     Tobacco Use    Smoking status: Every Day     Types: Cigarettes    Smokeless tobacco: Never   Vaping Use    Vaping status: Never Used   Substance Use Topics    Alcohol use: Yes     Alcohol/week: 77.0 standard drinks of alcohol     Types: 42 Cans of beer, 35 Shots of liquor per week    Drug use: Never       SCREENINGS        Eads Coma Scale  Eye Opening: Spontaneous  Best Verbal Response: Oriented  Best Motor Response: Obeys commands  Eads Coma Scale Score: 15                CIWA Assessment  BP: (!) 118/91  Pulse: 80           PHYSICAL EXAM  1 or more Elements     ED Triage Vitals   BP Systolic BP Percentile Diastolic BP Percentile Temp Temp Source Pulse Respirations SpO2   12/02/24 1815 -- -- 12/02/24 1821 12/02/24 1821 12/02/24 1821 12/02/24 1821 12/02/24 1845   (!) 134/103   97.4 °F (36.3 °C) Oral (!) 102 20 99 %      Height Weight - Scale         12/02/24 1821 12/02/24 1821         1.829 m (6') 92.3 kg (203 lb 7.8 oz)             Physical Exam  Vitals reviewed.   Constitutional:       General: He is not in acute distress.     Appearance: Normal appearance. He is ill-appearing. He is not toxic-appearing or diaphoretic.   HENT:      Head: Normocephalic and atraumatic.      Right Ear: External ear normal.      Left Ear: External ear normal.      Nose: Nose normal.      Mouth/Throat:      Mouth: Mucous membranes are dry.      Pharynx: Oropharynx is clear. No oropharyngeal exudate or posterior oropharyngeal erythema.   Eyes:      General:         Right eye: No discharge.         Left eye: No discharge.   Cardiovascular:      Rate and Rhythm: Normal rate and regular rhythm.      Pulses: Normal pulses.      Heart sounds: Normal heart sounds. No murmur heard.     No gallop.   Pulmonary:      Effort: Pulmonary effort is normal. No respiratory distress.      Breath sounds: Normal breath sounds. No stridor. No wheezing, rhonchi or rales.   Abdominal:

## 2024-12-03 NOTE — ED NOTES
ED RN transfers care to receiving unit RN.  Patient and family updated on wait for transfer to receiving unit.

## 2024-12-03 NOTE — PROGRESS NOTES
Patient arrives to room 4267 via transport.Patient admitted for altered mental status.Patient on room air.Bed in lowest position.Call light in reach.

## 2024-12-03 NOTE — PLAN OF CARE
Problem: Discharge Planning  Goal: Discharge to home or other facility with appropriate resources  12/3/2024 0935 by Yfn Carranza, RN  Outcome: Progressing     Problem: Safety - Adult  Goal: Free from fall injury  12/3/2024 0935 by Yfn Carranza, RN  Outcome: Progressing     Problem: Skin/Tissue Integrity  Goal: Absence of new skin breakdown  Description: 1.  Monitor for areas of redness and/or skin breakdown  2.  Assess vascular access sites hourly  3.  Every 4-6 hours minimum:  Change oxygen saturation probe site  4.  Every 4-6 hours:  If on nasal continuous positive airway pressure, respiratory therapy assess nares and determine need for appliance change or resting period.  12/3/2024 0935 by Yfn Carranza, RN  Outcome: Progressing

## 2024-12-03 NOTE — PROGRESS NOTES
Occupational Therapy  Facility/Department: [address] MED SURG  Occupational Therapy Initial Assessment    Name: Chavez Hyatt  : 1960  MRN: [id number]  Date of Service: 12/3/2024    Discharge Recommendations:  3-5 sessions per week, Patient would benefit from continued therapy after discharge     Chavez Hyatt scored a 14/24 on the AM-PAC ADL Inpatient form. Current research shows that an AM-PAC score of 17 or less is typically not associated with a discharge to the patient's home setting. Based on the patient's AM-PAC score and their current ADL deficits, it is recommended that the patient have 3-5 sessions per week of Occupational Therapy at d/c to increase the patient's independence.  Please see assessment section for further patient specific details.    If patient discharges prior to next session this note will serve as a discharge summary.  Please see below for the latest assessment towards goals.       Patient Diagnosis(es): The primary encounter diagnosis was Altered mental status, unspecified altered mental status type. Diagnoses of Acute cystitis without hematuria, Hypokalemia, Dehydration, Elevated LFTs, and History of alcohol use were also pertinent to this visit.  Past Medical History:  has no past medical history on file.  Past Surgical History:  has a past surgical history that includes Facial Surgery (Right, 10/14/2022).    Treatment Diagnosis: decreased fxl mobility/ADLs/cognition/balance    Assessment  Performance deficits / Impairments: Decreased functional mobility ;Decreased safe awareness;Decreased balance;Decreased ADL status;Decreased cognition;Decreased high-level IADLs;Decreased endurance;Decreased strength  Assessment: Pt is a 63 yo male admitted with AMS. PTA, pt living at home with sister where he is typically independent with ADLs and fxl mobility no device. Pt is a questionable historian and will need to verify social functional info. pt reports he lives alone. This date, pt

## 2024-12-03 NOTE — PROGRESS NOTES
Physical Therapy  Facility/Department: [address] MED SURG  Physical Therapy Initial Assessment    Name: Chavez Hyatt  : 1960  MRN: [id number]  Date of Service: 12/3/2024    Discharge Recommendations:  Therapy recommended at discharge, Continue to assess pending progress    Chavez Hyatt scored a  on the AM-PAC short mobility form. Current research shows that an AM-PAC score of 17 or less is typically not associated with a discharge to the patient's home setting. Based on the patient's AM-PAC score and their current functional mobility deficits, it is recommended that the patient have 3-5 sessions per week of Physical Therapy at d/c to increase the patient's independence.  Please see assessment section for further patient specific details.    If patient discharges prior to next session this note will serve as a discharge summary.  Please see below for the latest assessment towards goals.        Patient Diagnosis(es): The primary encounter diagnosis was Altered mental status, unspecified altered mental status type. Diagnoses of Acute cystitis without hematuria, Hypokalemia, Dehydration, Elevated LFTs, and History of alcohol use were also pertinent to this visit.  Past Medical History:  has no past medical history on file.  Past Surgical History:  has a past surgical history that includes Facial Surgery (Right, 10/14/2022).    Assessment  Body Structures, Functions, Activity Limitations Requiring Skilled Therapeutic Intervention: Decreased functional mobility ;Decreased ADL status;Decreased strength;Decreased endurance;Decreased cognition;Decreased safe awareness;Decreased balance  Assessment: Chavez Hyatt is a 64 y.o. male with no pmh who presents to the ED on 24 with AMS. Prior to admission, pt livign alone or with sister, independent with ADLs and ambulation without device (questionable historian). Pt currently unable to sit to EOB without multiple LOB. Recommend continued skilled therapy

## 2024-12-03 NOTE — PROGRESS NOTES
Discussed patient care at bedside with MD and patient. Sister is now at bedside and was updated - all questions answered at this time. MD Bates aware of low urine output - bladder scan was ordered - PRN straight cath for >400ml. Bladder scan showed 469ml at this time.     Patients sister (who states they live together) says that she noticed the cognitive decline starting about a month ago (lack of appetitive, decreased intake) - and noted that physical symptoms (frequent falls) started about 5-6 days ago where in the time he had about 5 falls.    MD Bates aware.    Electronically signed by Yfn Carranza RN on 12/3/2024 at 12:49 PM    Straight cath completed - 550ml of urine returned. Patient tolerated well. Bladder scan showed no volume PVR.    Electronically signed by Yfn Carranza RN on 12/3/2024 at 1:18 PM

## 2024-12-03 NOTE — PROGRESS NOTES
Patient sleeping at this time, holding off on breakfast and morning medications. Patient RR >12 and VSS on RA. Patient awakens to voice or touch but quickly falls back asleep. Per report patient able to take morning medications PO - this RN will re-assess to see when appropriate. All other needs meet at this time.    Electronically signed by Yfn Carranza RN on 12/3/2024 at 9:37 AM    Patient now awake, took morning medications without complications, is upright eating breakfast at this time. Patient is only alert to self at this time, otherwise he is pleasant.    Electronically signed by Yfn Carranza RN on 12/3/2024 at 10:05 AM

## 2024-12-03 NOTE — CARE COORDINATION
SW attempted to speak to patient, he is not alert and oriented enough and referred to his sister Coty. SW called Coty, and left  to return call. [phone]      Delta Walker LMSW, [facility] Social Work Case Management   Phone: [phone]  Fax: [phone]

## 2024-12-03 NOTE — CARE COORDINATION
Case Management Assessment  Initial Evaluation    Date/Time of Evaluation: 12/3/2024 12:02 PM  Assessment Completed by: CHELSEA Foster    If patient is discharged prior to next notation, then this note serves as note for discharge by case management.    Patient Name: Chavez Hyatt                   YOB: 1960  Diagnosis: Dehydration [E86.0]  Hypokalemia [E87.6]  Elevated LFTs [R79.89]  Acute cystitis without hematuria [N30.00]  History of alcohol use [Z87.898]  Altered mental status, unspecified altered mental status type [R41.82]  AMS (altered mental status) [R41.82]                   Date / Time: 12/2/2024  6:08 PM    Patient Admission Status: Inpatient   Readmission Risk (Low < 19, Mod (19-27), High > 27): Readmission Risk Score: 12.9    Current PCP: No, Pcp  PCP verified by CM? (P) Yes    Chart Reviewed: Yes      History Provided by: (P) Child/Family, Patient  Patient Orientation: (P) Person    Patient Cognition: (P) Short Term Memory Deficit    Hospitalization in the last 30 days (Readmission):  No    If yes, Readmission Assessment in CM Navigator will be completed.    Advance Directives:      Code Status: Full Code   Patient's Primary Decision Maker is: (P) Legal Next of Kin    Primary Decision Maker: Coty Reyes - Brother/Sister - [phone]    Discharge Planning:    Patient lives with: (P) Family Members Type of Home: (P) Apartment  Primary Care Giver: (P) Family  Patient Support Systems include: (P) Family Members   Current Financial resources: (P) None  Current community resources: (P) None  Current services prior to admission: (P) None            Current DME:              Type of Home Care services:  (P) None    ADLS  Prior functional level: (P) Independent in ADLs/IADLs, Mobility, Assistance with the following:, Bathing, Dressing  Current functional level: (P) Independent in ADLs/IADLs, Mobility, Dressing, Bathing, Assistance with the following:    PT AM-PAC: 11  Patient   Patient Representative Name:       The Patient and/or Patient Representative Agree with the Discharge Plan? (P) Yes    Delta Walker LMSW, [facility] Work Case Management   Phone: [phone]  Fax: [phone]

## 2024-12-03 NOTE — H&P
V2.0  History and Physical      Name:  Chavez Hyatt /Age/Sex: 1960  (64 y.o. male)   MRN & CSN:  [id number] & [id number] Encounter Date/Time: 2024 10:28 PM EST   Location:  [facility] PCP: Gladys, Pcp       Hospital Day: 1    Assessment and Plan:   Chavez Hyatt is a 64 y.o. male with no pmh who presents with AMS (altered mental status)    Hospital Problems             Last Modified POA    * (Principal) AMS (altered mental status) 2024 Yes       Plan:    #Acute cystitis without hematuria  #Altered mental status  #Lactic acidosis secondary to dehydration  #Hypokalemia due to poor oral intake  #Elevated LFT likely secondary to dehydration  #Multiple fall  #Physical deconditioning due to poor oral intake  Patient is currently her right oriented to person place time and situation  - HR > 100, RR > 20, afebrile  - Lactic acid 3.7 trended down to 2.  WBC normal.  - UA nitrate positive, trace ketone, trace protein, moderate bilirubin and 4 urobilinogen  - Total bilirubin 1.4, alk phos 140, ALT 43 AST 68  - CT of head without contrast no acute intracranial abnormality  - CT of chest no acute process  - CT cervical spine without contrast no acute intracranial abnormality or fracture.  - Blood and urine culture pending  - Alcohol, CK, ammonia, negative.  - Will obtain TSH, vitamin D  - Will provide IV fluid and antibiotic with ceftriaxone  - Will consult PT OT for treatment and evaluation  - Trend with the morning lab    #Hypomagnesemia 1.76 repleted accordingly continue to monitor.    DVT Prophylaxis: Lovenox  Diet: Regular diet  Code Status: Full code    Disposition:   Current Living situation: Home with sister, very independent  Expected Disposition: Home  Estimated D/C: 2 to 3-day    Diet ADULT DIET; Regular   DVT Prophylaxis [x] Lovenox, []  Heparin, [] SCDs, [] Ambulation,  [] Eliquis, [] Xarelto, [] Coumadin   Code Status Full Code   Surrogate Decision Maker/ POA        History from:     patient and

## 2024-12-03 NOTE — PROGRESS NOTES
V2.0  [facility] Hospitalist Progress Note      Name:  Chavez Hyatt /Age/Sex: 1960  (64 y.o. male)   MRN & CSN:  [id number] & [id number] Encounter Date/Time: 12/3/2024 9:29 AM EST    Location:  [id number] PCP: Gladys, Pcp       Hospital Day: 2    Assessment and Plan:   Chavez Hyatt is a 64 y.o. male presenting with progressive       Plan:  Acute cystitis  -UA with + nitrites, but not overly impressive for infection. Urine culture pending  -Antibiotics with Rocephin  Acute metabolic encephalopathy 2/2 possible infection  -CT head negative  -TSH elevated with mildly decreased T4.  While hypothyroidism can cause alteration in mental status, would expect a more severe level to be causing significant symptoms.  Patient also without other symptoms of hypothyroidism  -Lactic acidosis  Contributing as well  -Should improve with treatment of infection  -Appears to have had some improvement.  Was unable to state month/year earlier  Lactic acidosis due to infection and Dehydration  -Fluid resuscitation, normalized  Fall with head trauma  Generalized weakness  -Family reports gradually worsening weakness over the last week and has fallen 5 times.  Hit his head without loss of consciousness  -CT head negative as above  -PT OT eval      Ppx: lovenox  Dispo: TBD    Subjective:     Chief Complaint: Altered Mental Status (Pt comes in [facility] with c/o altered mental status. Family present and and states that pt has been falling a lot this week and that pt is not eating and drinking well. Pts skin is dry, abdomen is distended, pts blood sugar per emt was 96. Pt has no c/o pain at this time. Pt is alert and oriented x2. )     Interval Hx:  Patient pleasant and cooperative.  Alert to self, month and year.  Able to state he is in [facility], but not that he is in the hospital.  Patient denies fever/chills, nausea/vomiting, chest pain/shortness of breath, N/V constipation/diarrhea..  When asked about falls,  stroke\" or \"stroke alert\" been called?->No Decision Support Exception - unselect if not a suspected or confirmed emergency medical condition->Emergency Medical Condition (MA) Reason for Exam: ams, fall FINDINGS: BRAIN/VENTRICLES: There is no acute intracranial hemorrhage, mass effect or midline shift.  No abnormal extra-axial fluid collection.  The gray-white differentiation is maintained without evidence of an acute infarct.  There is mild diffuse parenchymal atrophy.  Patchy bilateral periventricular and subcortical white matter hypodensities are nonspecific, but compatible with chronic microvascular ischemic change. ORBITS: The visualized portion of the orbits demonstrate no acute abnormality. SINUSES: The visualized paranasal sinuses and mastoid air cells demonstrate no acute abnormality. SOFT TISSUES/SKULL:  No acute abnormality of the visualized skull or soft tissues. CERVICAL SPINE BONES/ALIGNMENT: There is no acute fracture or traumatic malalignment. DEGENERATIVE CHANGES: No evidence of high-grade bony spinal canal stenosis. SOFT TISSUES: There is no prevertebral soft tissue swelling.     No acute intracranial abnormality. No acute fracture or traumatic subluxation of the cervical spine.     XR CHEST PORTABLE    Result Date: 12/2/2024  EXAMINATION: ONE XRAY VIEW OF THE CHEST 12/2/2024 6:20 pm COMPARISON: 10/13/2022 HISTORY: ORDERING SYSTEM PROVIDED HISTORY: ams TECHNOLOGIST PROVIDED HISTORY: Reason for exam:->ams Reason for Exam: ams FINDINGS: The lungs are without acute focal process.  There is no effusion or pneumothorax. The cardiomediastinal silhouette is stable. The osseous structures are stable.     No acute process.       Electronically signed by Harjit Bates MD on 12/3/2024 at 9:29 AM

## 2024-12-03 NOTE — PLAN OF CARE
Problem: Discharge Planning  Goal: Discharge to home or other facility with appropriate resources  Outcome: Progressing  Flowsheets (Taken 12/3/2024 0135)  Discharge to home or other facility with appropriate resources:   Identify barriers to discharge with patient and caregiver   Identify discharge learning needs (meds, wound care, etc)   Refer to discharge planning if patient needs post-hospital services based on physician order or complex needs related to functional status, cognitive ability or social support system   Arrange for needed discharge resources and transportation as appropriate     Problem: Safety - Adult  Goal: Free from fall injury  Outcome: Progressing     Problem: Skin/Tissue Integrity  Goal: Absence of new skin breakdown  Description: 1.  Monitor for areas of redness and/or skin breakdown  2.  Assess vascular access sites hourly  3.  Every 4-6 hours minimum:  Change oxygen saturation probe site  4.  Every 4-6 hours:  If on nasal continuous positive airway pressure, respiratory therapy assess nares and determine need for appliance change or resting period.  Outcome: Progressing      [de-identified] :  april 20201 Mid LAD: There was a diffuse 10 % stenosis at the site of a\par prior angioplasty with stent placement.\par  mild nonobstructive disease of the left circumflex artery as well as right coronary artery\par

## 2024-12-04 ENCOUNTER — APPOINTMENT (OUTPATIENT)
Dept: CT IMAGING | Age: 64
DRG: 071 | End: 2024-12-04
Payer: MEDICARE

## 2024-12-04 LAB
ANION GAP SERPL CALCULATED.3IONS-SCNC: 10 MMOL/L (ref 3–16)
BASOPHILS # BLD: 0.1 K/UL (ref 0–0.2)
BASOPHILS NFR BLD: 1 %
BUN SERPL-MCNC: 7 MG/DL (ref 7–20)
CALCIUM SERPL-MCNC: 8.7 MG/DL (ref 8.3–10.6)
CHLORIDE SERPL-SCNC: 102 MMOL/L (ref 99–110)
CO2 SERPL-SCNC: 28 MMOL/L (ref 21–32)
CORTIS AM PEAK SERPL-MCNC: 13.2 UG/DL (ref 4.3–22.4)
CREAT SERPL-MCNC: 0.6 MG/DL (ref 0.8–1.3)
DEPRECATED RDW RBC AUTO: 15.7 % (ref 12.4–15.4)
EKG ATRIAL RATE: 98 BPM
EKG DIAGNOSIS: NORMAL
EKG P AXIS: 66 DEGREES
EKG P-R INTERVAL: 126 MS
EKG Q-T INTERVAL: 408 MS
EKG QRS DURATION: 124 MS
EKG QTC CALCULATION (BAZETT): 520 MS
EKG R AXIS: -70 DEGREES
EKG T AXIS: 11 DEGREES
EKG VENTRICULAR RATE: 98 BPM
EOSINOPHIL # BLD: 0.2 K/UL (ref 0–0.6)
EOSINOPHIL NFR BLD: 2.7 %
GFR SERPLBLD CREATININE-BSD FMLA CKD-EPI: >90 ML/MIN/{1.73_M2}
GLUCOSE SERPL-MCNC: 74 MG/DL (ref 70–99)
HCT VFR BLD AUTO: 41.4 % (ref 40.5–52.5)
HGB BLD-MCNC: 14.3 G/DL (ref 13.5–17.5)
LYMPHOCYTES # BLD: 1.6 K/UL (ref 1–5.1)
LYMPHOCYTES NFR BLD: 25.4 %
MAGNESIUM SERPL-MCNC: 1.89 MG/DL (ref 1.8–2.4)
MCH RBC QN AUTO: 33.8 PG (ref 26–34)
MCHC RBC AUTO-ENTMCNC: 34.6 G/DL (ref 31–36)
MCV RBC AUTO: 97.6 FL (ref 80–100)
MONOCYTES # BLD: 0.7 K/UL (ref 0–1.3)
MONOCYTES NFR BLD: 10.7 %
NEUTROPHILS # BLD: 3.7 K/UL (ref 1.7–7.7)
NEUTROPHILS NFR BLD: 60.2 %
PLATELET # BLD AUTO: 117 K/UL (ref 135–450)
PMV BLD AUTO: 7.1 FL (ref 5–10.5)
POTASSIUM SERPL-SCNC: 3.1 MMOL/L (ref 3.5–5.1)
RBC # BLD AUTO: 4.25 M/UL (ref 4.2–5.9)
SODIUM SERPL-SCNC: 140 MMOL/L (ref 136–145)
WBC # BLD AUTO: 6.2 K/UL (ref 4–11)

## 2024-12-04 PROCEDURE — 93010 ELECTROCARDIOGRAM REPORT: CPT | Performed by: STUDENT IN AN ORGANIZED HEALTH CARE EDUCATION/TRAINING PROGRAM

## 2024-12-04 PROCEDURE — 1200000000 HC SEMI PRIVATE

## 2024-12-04 PROCEDURE — 94760 N-INVAS EAR/PLS OXIMETRY 1: CPT

## 2024-12-04 PROCEDURE — 2580000003 HC RX 258

## 2024-12-04 PROCEDURE — 97530 THERAPEUTIC ACTIVITIES: CPT

## 2024-12-04 PROCEDURE — 85025 COMPLETE CBC W/AUTO DIFF WBC: CPT

## 2024-12-04 PROCEDURE — 6370000000 HC RX 637 (ALT 250 FOR IP)

## 2024-12-04 PROCEDURE — 80048 BASIC METABOLIC PNL TOTAL CA: CPT

## 2024-12-04 PROCEDURE — 6370000000 HC RX 637 (ALT 250 FOR IP): Performed by: STUDENT IN AN ORGANIZED HEALTH CARE EDUCATION/TRAINING PROGRAM

## 2024-12-04 PROCEDURE — 70450 CT HEAD/BRAIN W/O DYE: CPT

## 2024-12-04 PROCEDURE — 82533 TOTAL CORTISOL: CPT

## 2024-12-04 PROCEDURE — 51798 US URINE CAPACITY MEASURE: CPT

## 2024-12-04 PROCEDURE — 36415 COLL VENOUS BLD VENIPUNCTURE: CPT

## 2024-12-04 PROCEDURE — 83735 ASSAY OF MAGNESIUM: CPT

## 2024-12-04 PROCEDURE — 6360000002 HC RX W HCPCS

## 2024-12-04 RX ADMIN — SODIUM CHLORIDE, PRESERVATIVE FREE 10 ML: 5 INJECTION INTRAVENOUS at 08:17

## 2024-12-04 RX ADMIN — ENOXAPARIN SODIUM 40 MG: 100 INJECTION SUBCUTANEOUS at 08:17

## 2024-12-04 RX ADMIN — WATER 1000 MG: 1 INJECTION INTRAMUSCULAR; INTRAVENOUS; SUBCUTANEOUS at 20:49

## 2024-12-04 RX ADMIN — ACETAMINOPHEN 650 MG: 325 TABLET ORAL at 08:20

## 2024-12-04 RX ADMIN — Medication 100 MG: at 08:17

## 2024-12-04 RX ADMIN — SODIUM CHLORIDE, PRESERVATIVE FREE 10 ML: 5 INJECTION INTRAVENOUS at 09:00

## 2024-12-04 RX ADMIN — POTASSIUM BICARBONATE 40 MEQ: 391 TABLET, EFFERVESCENT ORAL at 13:16

## 2024-12-04 RX ADMIN — SODIUM CHLORIDE, PRESERVATIVE FREE 10 ML: 5 INJECTION INTRAVENOUS at 20:50

## 2024-12-04 ASSESSMENT — PAIN DESCRIPTION - ORIENTATION: ORIENTATION: LOWER

## 2024-12-04 ASSESSMENT — PAIN DESCRIPTION - LOCATION: LOCATION: BACK

## 2024-12-04 ASSESSMENT — PAIN SCALES - GENERAL: PAINLEVEL_OUTOF10: 6

## 2024-12-04 ASSESSMENT — PAIN DESCRIPTION - DESCRIPTORS: DESCRIPTORS: ACHING

## 2024-12-04 NOTE — PLAN OF CARE
Problem: Safety - Adult  Goal: Free from fall injury  12/4/2024 1200 by Ana Carter RN  Outcome: Progressing  12/4/2024 1142 by Ana Carter RN  Outcome: Progressing  12/4/2024 0107 by Valeria Rivas RN  Outcome: Progressing     Problem: Skin/Tissue Integrity  Goal: Absence of new skin breakdown  Description: 1.  Monitor for areas of redness and/or skin breakdown  2.  Assess vascular access sites hourly  3.  Every 4-6 hours minimum:  Change oxygen saturation probe site  4.  Every 4-6 hours:  If on nasal continuous positive airway pressure, respiratory therapy assess nares and determine need for appliance change or resting period.  12/4/2024 1200 by Ana Carter RN  Outcome: Progressing  12/4/2024 1142 by Ana Carter RN  Outcome: Progressing  12/4/2024 0107 by Valeria Rivas RN  Outcome: Progressing

## 2024-12-04 NOTE — PROGRESS NOTES
PT got Pt up in the chair. At 1500 Pt set the chair alarm off. This writter and PCA both run in the room. We found Pt sitting on the floor. Upon assessment Pt denied hitting this head, and pain. A set of vital signs obtained they are in file. Dr Harjit Bates. Supervisor, charge nurse and family all informed. A stat CT of head ordered. Pt just left the unit for radiology.

## 2024-12-04 NOTE — PROGRESS NOTES
Occupational Therapy  Facility/Department: [address] MED SURG  Occupational Therapy Daily Treatment Note    Name: Chavez Hyatt  : 1960  MRN: [id number]  Date of Service: 2024    Discharge Recommendations:  3-5 sessions per week, Patient would benefit from continued therapy after discharge  OT Equipment Recommendations  Other: TBD by d/c site    Chavez Hyatt scored a 16/24 on the AM-PAC ADL Inpatient form. Current research shows that an AM-PAC score of 17 or less is typically not associated with a discharge to the patient's home setting. Based on the patient's AM-PAC score and their current ADL deficits, it is recommended that the patient have 3-5 sessions per week of Occupational Therapy at d/c to increase the patient's independence.  Please see assessment section for further patient specific details.    If patient discharges prior to next session this note will serve as a discharge summary.  Please see below for the latest assessment towards goals.       Patient Diagnosis(es): The primary encounter diagnosis was Altered mental status, unspecified altered mental status type. Diagnoses of Acute cystitis without hematuria, Hypokalemia, Dehydration, Elevated LFTs, and History of alcohol use were also pertinent to this visit.  Past Medical History:  has no past medical history on file.  Past Surgical History:  has a past surgical history that includes Facial Surgery (Right, 10/14/2022).    Treatment Diagnosis: decreased fxl mobility/ADLs/cognition/balance    Assessment  Performance deficits / Impairments: Decreased functional mobility ;Decreased safe awareness;Decreased balance;Decreased ADL status;Decreased cognition;Decreased high-level IADLs;Decreased endurance;Decreased strength  Assessment: Pt is a 63 yo male admitted with AMS. PTA, pt living at home with sister where he is typically independent with ADLs and fxl mobility no device. Pt is a questionable historian and will need to verify social  Caregiver Present: No  Referring Practitioner: Kacy Daniel MD  Diagnosis: AMS  Subjective  Subjective: Pt met bedside upon arrival to room, in bed. confused, but more alert than yesterday. agreeable to OT/PT cotx. no reports of pain. reports min dizziness once sitting EOB.     Social/Functional History  Social/Functional History  Lives With: Alone  Type of Home: Apartment  Home Layout: One level  Home Access: Level entry  Bathroom Shower/Tub: Tub/Shower unit  Bathroom Toilet: Standard  Prior Level of Assist for ADLs: Independent  Prior Level of Assist for Homemaking: Independent  Prior Level of Assist for Transfers: Independent  Active : Yes  Occupation: Full time employment  Type of Occupation: [employer]  Leisure & Hobbies: golf and hang out with friends  Additional Comments: Questionable historian - ED notes report patient lives with sister.    Objective     Safety Devices  Type of Devices: All fall risk precautions in place;Call light within reach;Patient at risk for falls;Nurse notified;Left in chair;Chair alarm in place;Gait belt        AROM: Within functional limits  Strength: Generally decreased, functional  ADL  Functional Mobility: Dependent/Total  Functional Mobility Skilled Clinical Factors: totalA via STEDY from bed>recliner this date to complete safe mobility     Activity Tolerance  Activity Tolerance: Patient limited by fatigue;Treatment limited secondary to medical complications  Activity Tolerance Comments: Limited by dizziness  Bed mobility  Supine to Sit: Stand by assistance (HOB elevated; use of bedrail)  Sit to Supine: Unable to assess (pt seated in recliner at end of session)  Scooting: Stand by assistance  Bed Mobility Comments: Pt reporting dizziness upon sitting to EOB - but able to hold self up with bedrail support on L side. Pt reporting non-progressive but also non-alleviating.  Transfers  Sit to stand: Minimal assistance  Stand to sit: Minimal

## 2024-12-04 NOTE — PROGRESS NOTES
Comprehensive Nutrition Assessment    Type and Reason for Visit:  Positive nutrition screen    Nutrition Recommendations/Plan:   Continue current diet.   Magic cup BID.     Malnutrition Assessment:  Malnutrition Status:  At risk for malnutrition (12/04/24 1343)    Context:  Acute Illness     Findings of the 6 clinical characteristics of malnutrition:  Energy Intake:  Mild decrease in energy intake  Weight Loss:  Greater than 5% over 1 month (8%)       Nutrition Assessment:    MST=2. Pt. admitted for AMS. Currently receiving a regular diet. Diet acceptance is poor but improved today. He has been struggling with PO intake for approx. 1 month, general decline of appetite. Significant weight loss noted, 8% x1 mo. Recommend to start Magic cup to increase PO intake and support weight balance. Will monitor nutritional adequacy and diet acceptance while inpatient.    Nutrition Related Findings:    K 3.1, Cr 0.6. LBM PTA. Wound Type: None       Current Nutrition Intake & Therapies:    Average Meal Intake: 1-25%  Average Supplements Intake: None Ordered  ADULT DIET; Regular    Anthropometric Measures:  Height: 182.9 cm (6' 0.01\")  Ideal Body Weight (IBW): 178 lbs (81 kg)       Current Body Weight: 84.3 kg (185 lb 13.6 oz), 104.4 % IBW.    Current BMI (kg/m2): 25.2                             BMI Categories: Overweight (BMI 25.0-29.9)    Estimated Daily Nutrient Needs:  Energy Requirements Based On: Kcal/kg  Weight Used for Energy Requirements: Current  Energy (kcal/day): 2038-1780 kcal (20-25 kcal/kg)  Weight Used for Protein Requirements: Current  Protein (g/day):  g (1-1.2g/kg)  Method Used for Fluid Requirements: 1 ml/kcal  Fluid (ml/day): Or per provider.    Nutrition Diagnosis:   Inadequate oral intake related to inadequate protein-energy intake as evidenced by intake 0-25%    Nutrition Interventions:   Food and/or Nutrient Delivery: Continue Current Diet, Start Oral Nutrition Supplement  Nutrition  Education/Counseling: Education/Counseling not indicated  Coordination of Nutrition Care: Continue to monitor while inpatient       Goals:  Goals: PO intake 50% or greater  Type of Goal: New goal       Nutrition Monitoring and Evaluation:   Behavioral-Environmental Outcomes: None Identified  Food/Nutrient Intake Outcomes: Food and Nutrient Intake, Supplement Intake  Physical Signs/Symptoms Outcomes: Biochemical Data, GI Status    Discharge Planning:    Too soon to determine     Claribel Erickson RD  Contact: [phone]

## 2024-12-04 NOTE — CARE COORDINATION
Chart review done, rounds completed. Likely another 1-2 days, still confused.   Called sister Coty, she is waiting for other sister to help choose facilities.   Will call once she is able to discuss.     Delta Walker LMSW, [facility] Social Work Case Management   Phone: [phone]  Fax: [phone]

## 2024-12-04 NOTE — PROGRESS NOTES
Physical Therapy  Facility/Department: [address] MED SURG  Physical Therapy Treatment Note    Name: Chavez Hyatt  : 1960  MRN: [id number]  Date of Service: 2024    Discharge Recommendations:  Therapy recommended at discharge, Continue to assess pending progress    Chaevz Hyatt scored a  on the AM-PAC short mobility form. Current research shows that an AM-PAC score of 17 or less is typically not associated with a discharge to the patient's home setting. Based on the patient's AM-PAC score and their current functional mobility deficits, it is recommended that the patient have 3-5 sessions per week of Physical Therapy at d/c to increase the patient's independence.  Please see assessment section for further patient specific details.    If patient discharges prior to next session this note will serve as a discharge summary.  Please see below for the latest assessment towards goals.        Patient Diagnosis(es): The primary encounter diagnosis was Altered mental status, unspecified altered mental status type. Diagnoses of Acute cystitis without hematuria, Hypokalemia, Dehydration, Elevated LFTs, and History of alcohol use were also pertinent to this visit.  Past Medical History:  has no past medical history on file.  Past Surgical History:  has a past surgical history that includes Facial Surgery (Right, 10/14/2022).    Assessment  Body Structures, Functions, Activity Limitations Requiring Skilled Therapeutic Intervention: Decreased functional mobility ;Decreased ADL status;Decreased strength;Decreased endurance;Decreased cognition;Decreased safe awareness;Decreased balance  Assessment: Chavez Hyatt is a 64 y.o. male with no pmh who presents to the ED on 24 with AMS. Prior to admission, pt livign alone or with sister, independent with ADLs and ambulation without device (questionable historian). Pt currently requiring LIFT equipment for oob mobility. Recommend continued skilled therapy  of Assist for Transfers: Independent  Active : Yes  Occupation: Full time employment  Type of Occupation: [employer]  Leisure & Hobbies: golf and hang out with friends  Additional Comments: Questionable historian - ED notes report patient lives with sister.    Cognition   Orientation  Overall Orientation Status: Impaired  Orientation Level: Oriented to place;Oriented to person;Disoriented to situation (Thought it was December 2025; unsure of why he is in the hospital)  Cognition  Overall Cognitive Status: Exceptions  Arousal/Alertness: Appears intact  Following Commands: Appears intact  Attention Span: Appears intact  Memory: Decreased recall of recent events  Safety Judgement: Decreased awareness of need for safety  Problem Solving: Decreased awareness of errors  Insights: Decreased awareness of deficits  Initiation: Requires cues for some    Objective  Gross Assessment  Strength: Generally decreased, functional    Bed mobility  Supine to Sit: Stand by assistance (HOB elevated; use of bedrail)  Sit to Supine: Unable to assess (pt seated in recliner at end of session)  Scooting: Stand by assistance  Bed Mobility Comments: Pt reporting min/mod dizziness upon sitting to EOB - but able to hold self up with bedrail support on L side. Pt reporting non-progressive but also non-alleviating.  Transfers  Sit to Stand: Minimal Assistance  Stand to Sit: Minimal Assistance  Bed to Chair: Dependent/Total (use of stedy)        Balance  Posture: Poor  Sitting - Static: Poor;+  Sitting - Dynamic: Poor  Standing - Static: Poor         AM-PAC - Mobility  AM-PAC Basic Mobility - Inpatient   How much help is needed turning from your back to your side while in a flat bed without using bedrails?: A Little  How much help is needed moving from lying on your back to sitting on the side of a flat bed without using bedrails?: A Little  How much help is needed moving to and from a bed to a chair?: A Lot  How much help is needed

## 2024-12-04 NOTE — PLAN OF CARE
Problem: Safety - Adult  Goal: Free from fall injury  12/4/2024 1142 by Ana Carter RN  Outcome: Progressing  12/4/2024 0107 by Valeria Riavs RN  Outcome: Progressing     Problem: Skin/Tissue Integrity  Goal: Absence of new skin breakdown  Description: 1.  Monitor for areas of redness and/or skin breakdown  2.  Assess vascular access sites hourly  3.  Every 4-6 hours minimum:  Change oxygen saturation probe site  4.  Every 4-6 hours:  If on nasal continuous positive airway pressure, respiratory therapy assess nares and determine need for appliance change or resting period.  12/4/2024 1142 by Ana Carter RN  Outcome: Progressing  12/4/2024 0107 by Valeria Rivas RN  Outcome: Progressing     Problem: Nutrition Deficit:  Goal: Optimize nutritional status  12/4/2024 1142 by Ana Carter RN  Outcome: Progressing  12/4/2024 0947 by Claribel Erickson, MARY BETH  Outcome: Progressing  Flowsheets (Taken 12/4/2024 0947)  Nutrient intake appropriate for improving, restoring, or maintaining nutritional needs:   Assess nutritional status and recommend course of action   Monitor oral intake, labs, and treatment plans   Recommend appropriate diets, oral nutritional supplements, and vitamin/mineral supplements   Order, calculate, and assess calorie counts as needed   Recommend, monitor, and adjust tube feedings and TPN/PPN based on assessed needs   Provide specific nutrition education to patient or family as appropriate

## 2024-12-04 NOTE — PROGRESS NOTES
nonspecific, but compatible with chronic microvascular ischemic change. ORBITS: The visualized portion of the orbits demonstrate no acute abnormality. SINUSES: The visualized paranasal sinuses and mastoid air cells demonstrate no acute abnormality. SOFT TISSUES/SKULL:  No acute abnormality of the visualized skull or soft tissues. CERVICAL SPINE BONES/ALIGNMENT: There is no acute fracture or traumatic malalignment. DEGENERATIVE CHANGES: No evidence of high-grade bony spinal canal stenosis. SOFT TISSUES: There is no prevertebral soft tissue swelling.     No acute intracranial abnormality. No acute fracture or traumatic subluxation of the cervical spine.     CT CERVICAL SPINE WO CONTRAST    Result Date: 12/2/2024  EXAMINATION: CT OF THE CERVICAL SPINE WITHOUT CONTRAST; CT OF THE HEAD WITHOUT CONTRAST 12/2/2024 5:30 pm TECHNIQUE: CT of the cervical spine was performed without the administration of intravenous contrast. Multiplanar reformatted images are provided for review. Automated exposure control, iterative reconstruction, and/or weight based adjustment of the mA/kV was utilized to reduce the radiation dose to as low as reasonably achievable.; CT of the head was performed without the administration of intravenous contrast. Automated exposure control, iterative reconstruction, and/or weight based adjustment of the mA/kV was utilized to reduce the radiation dose to as low as reasonably achievable. COMPARISON: None. HISTORY: ORDERING SYSTEM PROVIDED HISTORY: fall TECHNOLOGIST PROVIDED HISTORY: Reason for exam:->fall Decision Support Exception - unselect if not a suspected or confirmed emergency medical condition->Emergency Medical Condition (MA) Reason for Exam: ams, fall; ORDERING SYSTEM PROVIDED HISTORY: ams, fall TECHNOLOGIST PROVIDED HISTORY: Reason for exam:->ams, fall Has a \"code stroke\" or \"stroke alert\" been called?->No Decision Support Exception - unselect if not a suspected or confirmed emergency medical  condition->Emergency Medical Condition (MA) Reason for Exam: ams, fall FINDINGS: BRAIN/VENTRICLES: There is no acute intracranial hemorrhage, mass effect or midline shift.  No abnormal extra-axial fluid collection.  The gray-white differentiation is maintained without evidence of an acute infarct.  There is mild diffuse parenchymal atrophy.  Patchy bilateral periventricular and subcortical white matter hypodensities are nonspecific, but compatible with chronic microvascular ischemic change. ORBITS: The visualized portion of the orbits demonstrate no acute abnormality. SINUSES: The visualized paranasal sinuses and mastoid air cells demonstrate no acute abnormality. SOFT TISSUES/SKULL:  No acute abnormality of the visualized skull or soft tissues. CERVICAL SPINE BONES/ALIGNMENT: There is no acute fracture or traumatic malalignment. DEGENERATIVE CHANGES: No evidence of high-grade bony spinal canal stenosis. SOFT TISSUES: There is no prevertebral soft tissue swelling.     No acute intracranial abnormality. No acute fracture or traumatic subluxation of the cervical spine.     XR CHEST PORTABLE    Result Date: 12/2/2024  EXAMINATION: ONE XRAY VIEW OF THE CHEST 12/2/2024 6:20 pm COMPARISON: 10/13/2022 HISTORY: ORDERING SYSTEM PROVIDED HISTORY: ams TECHNOLOGIST PROVIDED HISTORY: Reason for exam:->ams Reason for Exam: ams FINDINGS: The lungs are without acute focal process.  There is no effusion or pneumothorax. The cardiomediastinal silhouette is stable. The osseous structures are stable.     No acute process.       Electronically signed by Harjit Bates MD on 12/4/2024 at 9:05 AM

## 2024-12-05 ENCOUNTER — APPOINTMENT (OUTPATIENT)
Dept: MRI IMAGING | Age: 64
DRG: 071 | End: 2024-12-05
Payer: MEDICARE

## 2024-12-05 ENCOUNTER — APPOINTMENT (OUTPATIENT)
Dept: GENERAL RADIOLOGY | Age: 64
DRG: 071 | End: 2024-12-05
Payer: MEDICARE

## 2024-12-05 ENCOUNTER — APPOINTMENT (OUTPATIENT)
Dept: CT IMAGING | Age: 64
DRG: 071 | End: 2024-12-05
Payer: MEDICARE

## 2024-12-05 LAB
FOLATE SERPL-MCNC: <2 NG/ML (ref 4.78–24.2)
VIT B12 SERPL-MCNC: 646 PG/ML (ref 211–911)

## 2024-12-05 PROCEDURE — 97530 THERAPEUTIC ACTIVITIES: CPT

## 2024-12-05 PROCEDURE — 86780 TREPONEMA PALLIDUM: CPT

## 2024-12-05 PROCEDURE — 6370000000 HC RX 637 (ALT 250 FOR IP): Performed by: NURSE PRACTITIONER

## 2024-12-05 PROCEDURE — 73560 X-RAY EXAM OF KNEE 1 OR 2: CPT

## 2024-12-05 PROCEDURE — 84446 ASSAY OF VITAMIN E: CPT

## 2024-12-05 PROCEDURE — A9579 GAD-BASE MR CONTRAST NOS,1ML: HCPCS | Performed by: STUDENT IN AN ORGANIZED HEALTH CARE EDUCATION/TRAINING PROGRAM

## 2024-12-05 PROCEDURE — 6360000002 HC RX W HCPCS: Performed by: NURSE PRACTITIONER

## 2024-12-05 PROCEDURE — 51798 US URINE CAPACITY MEASURE: CPT

## 2024-12-05 PROCEDURE — 84207 ASSAY OF VITAMIN B-6: CPT

## 2024-12-05 PROCEDURE — 1200000000 HC SEMI PRIVATE

## 2024-12-05 PROCEDURE — 82746 ASSAY OF FOLIC ACID SERUM: CPT

## 2024-12-05 PROCEDURE — 70553 MRI BRAIN STEM W/O & W/DYE: CPT

## 2024-12-05 PROCEDURE — 87390 HIV-1 AG IA: CPT

## 2024-12-05 PROCEDURE — 6370000000 HC RX 637 (ALT 250 FOR IP)

## 2024-12-05 PROCEDURE — 6360000004 HC RX CONTRAST MEDICATION: Performed by: STUDENT IN AN ORGANIZED HEALTH CARE EDUCATION/TRAINING PROGRAM

## 2024-12-05 PROCEDURE — 86701 HIV-1ANTIBODY: CPT

## 2024-12-05 PROCEDURE — 86702 HIV-2 ANTIBODY: CPT

## 2024-12-05 PROCEDURE — 2580000003 HC RX 258

## 2024-12-05 PROCEDURE — 6360000002 HC RX W HCPCS

## 2024-12-05 PROCEDURE — 94760 N-INVAS EAR/PLS OXIMETRY 1: CPT

## 2024-12-05 PROCEDURE — 82607 VITAMIN B-12: CPT

## 2024-12-05 PROCEDURE — 36415 COLL VENOUS BLD VENIPUNCTURE: CPT

## 2024-12-05 PROCEDURE — 83036 HEMOGLOBIN GLYCOSYLATED A1C: CPT

## 2024-12-05 PROCEDURE — 84425 ASSAY OF VITAMIN B-1: CPT

## 2024-12-05 PROCEDURE — 6370000000 HC RX 637 (ALT 250 FOR IP): Performed by: STUDENT IN AN ORGANIZED HEALTH CARE EDUCATION/TRAINING PROGRAM

## 2024-12-05 PROCEDURE — 70450 CT HEAD/BRAIN W/O DYE: CPT

## 2024-12-05 RX ORDER — THIAMINE HYDROCHLORIDE 100 MG/ML
200 INJECTION, SOLUTION INTRAMUSCULAR; INTRAVENOUS EVERY 8 HOURS SCHEDULED
Status: COMPLETED | OUTPATIENT
Start: 2024-12-05 | End: 2024-12-08

## 2024-12-05 RX ORDER — VITAMIN B COMPLEX
2000 TABLET ORAL DAILY
Status: DISCONTINUED | OUTPATIENT
Start: 2024-12-05 | End: 2024-12-12 | Stop reason: HOSPADM

## 2024-12-05 RX ORDER — FOLIC ACID 1 MG/1
1 TABLET ORAL DAILY
Status: DISCONTINUED | OUTPATIENT
Start: 2024-12-05 | End: 2024-12-12 | Stop reason: HOSPADM

## 2024-12-05 RX ADMIN — WATER 1000 MG: 1 INJECTION INTRAMUSCULAR; INTRAVENOUS; SUBCUTANEOUS at 21:17

## 2024-12-05 RX ADMIN — ENOXAPARIN SODIUM 40 MG: 100 INJECTION SUBCUTANEOUS at 08:36

## 2024-12-05 RX ADMIN — Medication 6 MG: at 21:33

## 2024-12-05 RX ADMIN — SODIUM CHLORIDE, PRESERVATIVE FREE 10 ML: 5 INJECTION INTRAVENOUS at 08:36

## 2024-12-05 RX ADMIN — THIAMINE HYDROCHLORIDE 200 MG: 100 INJECTION, SOLUTION INTRAMUSCULAR; INTRAVENOUS at 21:11

## 2024-12-05 RX ADMIN — GADOTERIDOL 20 ML: 279.3 INJECTION, SOLUTION INTRAVENOUS at 12:06

## 2024-12-05 RX ADMIN — Medication 2000 UNITS: at 17:49

## 2024-12-05 RX ADMIN — FOLIC ACID 1 MG: 1 TABLET ORAL at 21:32

## 2024-12-05 RX ADMIN — THIAMINE HYDROCHLORIDE 200 MG: 100 INJECTION, SOLUTION INTRAMUSCULAR; INTRAVENOUS at 17:49

## 2024-12-05 RX ADMIN — Medication 100 MG: at 08:36

## 2024-12-05 RX ADMIN — SODIUM CHLORIDE, PRESERVATIVE FREE 10 ML: 5 INJECTION INTRAVENOUS at 21:13

## 2024-12-05 ASSESSMENT — PAIN SCALES - GENERAL: PAINLEVEL_OUTOF10: 0
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
## 2024-12-05 NOTE — PROGRESS NOTES
Bed alarm sounded, upon entering the room pt is lying on the floor beside the bed, covered in stool. Pt stated he was going to the bathroom.   Pt assisted back to bed and cleaned up. Secure message sent to Dr. Rosas. Pt denies hitting his head. Pt is confused and thinks he's at Bill's house. Order placed for CT of head and Tele cam placed in room.     Electronically signed by Christine Lopez RN on 12/5/2024 at 5:46 AM

## 2024-12-05 NOTE — PLAN OF CARE
Problem: Discharge Planning  Goal: Discharge to home or other facility with appropriate resources  Outcome: Progressing     Problem: Safety - Adult  Goal: Free from fall injury  12/5/2024 0155 by Christine Lopez RN  Outcome: Progressing  12/4/2024 1200 by Ana Carter RN  Outcome: Progressing     Problem: Skin/Tissue Integrity  Goal: Absence of new skin breakdown  Description: 1.  Monitor for areas of redness and/or skin breakdown  2.  Assess vascular access sites hourly  3.  Every 4-6 hours minimum:  Change oxygen saturation probe site  4.  Every 4-6 hours:  If on nasal continuous positive airway pressure, respiratory therapy assess nares and determine need for appliance change or resting period.  12/5/2024 0155 by Christine Lopez RN  Outcome: Progressing  12/4/2024 1200 by Ana Carter RN  Outcome: Progressing     Problem: Nutrition Deficit:  Goal: Optimize nutritional status  Outcome: Progressing     Problem: Neurosensory - Adult  Goal: Achieves stable or improved neurological status  Outcome: Progressing     Problem: Skin/Tissue Integrity - Adult  Goal: Skin integrity remains intact  Outcome: Progressing  Goal: Incisions, wounds, or drain sites healing without S/S of infection  Outcome: Progressing     Problem: Musculoskeletal - Adult  Goal: Return mobility to safest level of function  Outcome: Progressing     Problem: Gastrointestinal - Adult  Goal: Minimal or absence of nausea and vomiting  Outcome: Progressing  Goal: Maintains adequate nutritional intake  Outcome: Progressing     Problem: Genitourinary - Adult  Goal: Absence of urinary retention  Outcome: Progressing

## 2024-12-05 NOTE — CARE COORDINATION
Chart review done, rounds completed. Pt will be another 1-2 days. Pending MRI and neuro consult.   Will follow,    Delta Walker LMSW, [facility] Social Work Case Management   Phone: [phone]  Fax: [phone]

## 2024-12-05 NOTE — DISCHARGE INSTR - COC
Continuity of Care Form    Patient Name: Chavez Hyatt   :  1960  MRN:  [id number]    Admit date:  2024  Discharge date:  24    Code Status Order: Full Code   Advance Directives:   Advance Care Flowsheet Documentation             Admitting Physician:  Kacy Daniel MD  PCP: Gladys, Pcp    Discharging Nurse: Amarilys Garland  Discharging Hospital Unit/Room#: E3H-2114/4267-01  Discharging Unit Phone Number: [phone]    Emergency Contact:   Extended Emergency Contact Information  Primary Emergency Contact: Coty Reyes  Home Phone: [phone]  Mobile Phone: [phone]  Relation: Brother/Sister  Preferred language: English   needed? No    Past Surgical History:  Past Surgical History:   Procedure Laterality Date    FACIAL SURGERY Right 10/14/2022    COMPLEX CLOSURE OF RIGHT FACE AND EAR DEFECT performed by Sage Wallace MD at [facility] OR       Immunization History:   Immunization History   Administered Date(s) Administered    TD 5LF, TENIVAC, (age 7y+), IM, 0.5mL 10/13/2022       Active Problems:  Patient Active Problem List   Diagnosis Code    Frequent falls R29.6    Fall at home, initial encounter W19.XXXA, Y92.009    AMS (altered mental status) R41.82       Isolation/Infection:   Isolation            No Isolation          Patient Infection Status       None to display            Nurse Assessment:  Last Vital Signs: /85   Pulse (!) 120   Temp 98 °F (36.7 °C) (Oral)   Resp 16   Ht 1.829 m (6' 0.01\")   Wt 82.5 kg (181 lb 14.4 oz)   SpO2 98%   BMI 24.66 kg/m²     Last documented pain score (0-10 scale): Pain Level: 6  Last Weight:   Wt Readings from Last 1 Encounters:   24 82.5 kg (181 lb 14.4 oz)     Mental Status:  disoriented, alert, and intermittently orientedx2-3    IV Access:  - None    Nursing Mobility/ADLs:  Walking   Assisted  Transfer  Assisted  Bathing  Assisted  Dressing  Assisted  Toileting  Assisted  Feeding  Independent  Med

## 2024-12-05 NOTE — PROGRESS NOTES
This RN was notified by respiratory therapist that patient had fallen. Upon entering room, pt was lying on floor at the end of transport stretcher. Transporter stated \"patient said he could walk\" but transporter denied witnessing the fall. Pt assisted back to bed using hovermat. Pt denies any pain after fall. MD Dr. Bates notified & aware. Pt's sister Zoila arrived to room shortly after fall & was notified. This RN also called pt's sister Coty.

## 2024-12-05 NOTE — PLAN OF CARE
Problem: Discharge Planning  Goal: Discharge to home or other facility with appropriate resources  12/5/2024 0227 by Christine Lopez, RN  Outcome: Progressing  12/5/2024 0155 by Christine Lopez RN  Outcome: Progressing  Flowsheets (Taken 12/4/2024 2045)  Discharge to home or other facility with appropriate resources: Identify barriers to discharge with patient and caregiver     Problem: Safety - Adult  Goal: Free from fall injury  12/5/2024 0227 by Christine Lopez, RN  Outcome: Progressing  12/5/2024 0155 by Christine Lopez RN  Outcome: Progressing     Problem: Skin/Tissue Integrity  Goal: Absence of new skin breakdown  Description: 1.  Monitor for areas of redness and/or skin breakdown  2.  Assess vascular access sites hourly  3.  Every 4-6 hours minimum:  Change oxygen saturation probe site  4.  Every 4-6 hours:  If on nasal continuous positive airway pressure, respiratory therapy assess nares and determine need for appliance change or resting period.  12/5/2024 0227 by Christine Lopez, RN  Outcome: Progressing  12/5/2024 0155 by Christine Lopez, RN  Outcome: Progressing     Problem: Nutrition Deficit:  Goal: Optimize nutritional status  12/5/2024 0227 by Christine Lopez, RN  Outcome: Progressing  12/5/2024 0155 by Christine Lopez RN  Outcome: Progressing     Problem: Neurosensory - Adult  Goal: Achieves stable or improved neurological status  12/5/2024 0227 by Christine Lopez, RN  Outcome: Progressing  12/5/2024 0155 by Christine Lopez, RN  Outcome: Progressing     Problem: Skin/Tissue Integrity - Adult  Goal: Skin integrity remains intact  12/5/2024 0227 by Christine Lopez, RN  Outcome: Progressing  12/5/2024 0155 by Christine Lopez, RN  Outcome: Progressing  Flowsheets (Taken 12/4/2024 2045)  Skin Integrity Remains Intact: Monitor for areas of redness and/or skin breakdown  Goal: Incisions, wounds, or drain sites healing without S/S of infection  12/5/2024 0227 by Christine Lopez,  RN  Outcome: Progressing  12/5/2024 0155 by Christine Lopez, RN  Outcome: Progressing  Flowsheets (Taken 12/4/2024 2045)  Incisions, Wounds, or Drain Sites Healing Without Sign and Symptoms of Infection: TWICE DAILY: Assess and document skin integrity     Problem: Musculoskeletal - Adult  Goal: Return mobility to safest level of function  12/5/2024 0227 by Christine oLpez, RN  Outcome: Progressing  12/5/2024 0155 by Christine Lopez, RN  Outcome: Progressing  Flowsheets (Taken 12/4/2024 2045)  Return Mobility to Safest Level of Function: Assess patient stability and activity tolerance for standing, transferring and ambulating with or without assistive devices     Problem: Gastrointestinal - Adult  Goal: Minimal or absence of nausea and vomiting  12/5/2024 0227 by Christine Lopez, RN  Outcome: Progressing  12/5/2024 0155 by Christine Lopez, RN  Outcome: Progressing  Goal: Maintains adequate nutritional intake  12/5/2024 0227 by Christine Lopez, RN  Outcome: Progressing  12/5/2024 0155 by Christine Lopez, RN  Outcome: Progressing     Problem: Genitourinary - Adult  Goal: Absence of urinary retention  12/5/2024 0227 by Christine Lopez, RN  Outcome: Progressing  12/5/2024 0155 by Christine Lopez, RN  Outcome: Progressing

## 2024-12-05 NOTE — PROGRESS NOTES
Upon entering room, this RN saw pt sitting on floor at bottom of recliner chair. PCA stated that she & another RN were attempting to use stedy to move patient from chair to bed. PCA stated that the pt stood up well on stedy & then fell backwards and slid down to the floor. Pt was assisted into bed. MD Dr. Bates notified & aware. Pt's sister Coty also notified.
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
## 2024-12-05 NOTE — PLAN OF CARE
Problem: Discharge Planning  Goal: Discharge to home or other facility with appropriate resources  Outcome: Progressing     Problem: Safety - Adult  Goal: Free from fall injury  Outcome: Progressing     Problem: Skin/Tissue Integrity  Goal: Absence of new skin breakdown  Description: 1.  Monitor for areas of redness and/or skin breakdown  2.  Assess vascular access sites hourly  3.  Every 4-6 hours minimum:  Change oxygen saturation probe site  4.  Every 4-6 hours:  If on nasal continuous positive airway pressure, respiratory therapy assess nares and determine need for appliance change or resting period.  Outcome: Progressing     Problem: Nutrition Deficit:  Goal: Optimize nutritional status  Outcome: Progressing     Problem: Neurosensory - Adult  Goal: Achieves stable or improved neurological status  Outcome: Progressing     Problem: Skin/Tissue Integrity - Adult  Goal: Skin integrity remains intact  Outcome: Progressing  Goal: Incisions, wounds, or drain sites healing without S/S of infection  Outcome: Progressing     Problem: Musculoskeletal - Adult  Goal: Return mobility to safest level of function  Outcome: Progressing     Problem: Gastrointestinal - Adult  Goal: Minimal or absence of nausea and vomiting  Outcome: Progressing  Goal: Maintains adequate nutritional intake  Outcome: Progressing     Problem: Genitourinary - Adult  Goal: Absence of urinary retention  Outcome: Progressing

## 2024-12-05 NOTE — PROGRESS NOTES
POST FALL MANAGEMENT    Chavez Hyatt  MEDICAL RECORD NUMBER:  [id number]  AGE: 64 y.o.   GENDER: male  : 1960  TODAYS DATE:  2024    Details     Fall Occurred: Yes    Was the Fall Witnessed:  No      Brief Review of Event: pt attempted to go to the bathroom unassisted         Who found the patient: Christine CASTRO      Where was the patient at the time of the fall: bed      Patient Comments: I was going to the bathroom       Date Fall Occurred:  2024 .       Time Fall Occurred: 5:10a.m.     Assessment     Post Fall Head to Toe Assessment Completed: Yes    Post Fall Predictive Analytic Score Reviewed: Yes     Post Fall Vitals Completed: Yes    Post Fall Neuro Checks Completed: Yes    Injury Occurred(if yes, describe injury):  no           Did the Patient Experience:(Check Domingo all that apply)    [] Patient hit head  [] Loss of consciousness  [] Change in mental status following the fall  [x] Patient is on an anticoagulant medication      CT Performed:  yes    Follow-up     Persons Notified of Fall:  (Provide names of persons notified)   [x] Physician:   [] ROSA:  [x] Nursing Supervisior:  [] Manager:  [] Pharmacist:  [] Family:  [] Other:      Electronically signed by Christine Lopez RN 2024 at 6:46 AM

## 2024-12-05 NOTE — PROGRESS NOTES
assistance  Transfer Comments: STS EOB to RW w/Min Ax1 Mod Ax1, pt unable to maintain standing position, bilateral knees buckling and pt immediatley sitting on bed. STS from EOB to elizabeth tinajerody w/Min Ax1 Mod Ax1     Cognition  Overall Cognitive Status: Exceptions  Arousal/Alertness: Appears intact  Following Commands: Appears intact  Attention Span: Appears intact  Memory: Decreased recall of recent events  Safety Judgement: Decreased awareness of need for safety  Problem Solving: Decreased awareness of errors  Insights: Decreased awareness of deficits  Initiation: Requires cues for some  Orientation  Overall Orientation Status: Impaired  Orientation Level: Oriented to place;Oriented to person;Disoriented to situation (Thought it was December 2025; unsure of why he is in the hospital)                  Education Given To: Patient  Education Provided: Role of Therapy;Plan of Care;Orientation;Transfer Training  Education Method: Verbal;Demonstration  Barriers to Learning: Cognition  Education Outcome: Continued education needed        AM-PAC - ADL  AM-PAC Daily Activity - Inpatient   How much help is needed for putting on and taking off regular lower body clothing?: A Lot  How much help is needed for bathing (which includes washing, rinsing, drying)?: A Lot  How much help is needed for toileting (which includes using toilet, bedpan, or urinal)?: A Lot  How much help is needed for putting on and taking off regular upper body clothing?: A Little  How much help is needed for taking care of personal grooming?: A Little  How much help for eating meals?: None  AM-PAC Inpatient Daily Activity Raw Score: 16  AM-PAC Inpatient ADL T-Scale Score : 35.96  ADL Inpatient CMS 0-100% Score: 53.32  ADL Inpatient CMS G-Code Modifier : CK         Goals  Short Term Goals  Time Frame for Short Term Goals: prior to d/c: all goals ongoing except where noted 12/4  Short Term Goal 1: SBA bed mobility in prep for fxl ADL tx- goal met 12/4  Short

## 2024-12-05 NOTE — PROGRESS NOTES
Physical Therapy  Facility/Department: [address] MED SURG  Physical Therapy Treatment Note    Name: Chavez Hyatt  : 1960  MRN: [id number]  Date of Service: 2024    Discharge Recommendations:  Therapy recommended at discharge, Continue to assess pending progress    Chavez Hyatt scored a  on the AM-PAC short mobility form. Current research shows that an AM-PAC score of 17 or less is typically not associated with a discharge to the patient's home setting. Based on the patient's AM-PAC score and their current functional mobility deficits, it is recommended that the patient have 3-5 sessions per week of Physical Therapy at d/c to increase the patient's independence.  Please see assessment section for further patient specific details.    If patient discharges prior to next session this note will serve as a discharge summary.  Please see below for the latest assessment towards goals.        Patient Diagnosis(es): The primary encounter diagnosis was Altered mental status, unspecified altered mental status type. Diagnoses of Acute cystitis without hematuria, Hypokalemia, Dehydration, Elevated LFTs, and History of alcohol use were also pertinent to this visit.  Past Medical History:  has no past medical history on file.  Past Surgical History:  has a past surgical history that includes Facial Surgery (Right, 10/14/2022).    Assessment  Body Structures, Functions, Activity Limitations Requiring Skilled Therapeutic Intervention: Decreased functional mobility ;Decreased ADL status;Decreased strength;Decreased endurance;Decreased cognition;Decreased safe awareness;Decreased balance  Assessment: Chavez Hyatt is a 64 y.o. male with no pmh who presents to the ED on 24 with AMS. Prior to admission, pt livign alone or with sister, independent with ADLs and ambulation without device (questionable historian). R knee buckling when in stance phase of gait, therefore continues to require LIFT equipment for oob  much help is needed standing up from a chair using your arms?: A Lot  How much help is needed walking in hospital room?: A Lot  How much help is needed climbing 3-5 steps with a railing?: Total  AM-PAC Inpatient Mobility Raw Score : 13  AM-PAC Inpatient T-Scale Score : 36.74  Mobility Inpatient CMS 0-100% Score: 64.91  Mobility Inpatient CMS G-Code Modifier : CL      Goals  Short Term Goals  Time Frame for Short Term Goals: by acute discharge - all goals ongoing as of 12/5/24  Short Term Goal 1: bed mobility mod I  Short Term Goal 2: sit<>Stand SBA  Short Term Goal 3: ambulate > 50' with SBA  Patient Goals   Patient Goals : none stated       Education  Patient Education  Education Given To: Patient  Education Provided: Role of Therapy;Plan of Care  Education Method: Verbal  Barriers to Learning: Cognition  Education Outcome: Continued education needed      Therapy Time   Individual Concurrent Group Co-treatment   Time In 1300         Time Out 1325         Minutes 25         Timed Code Treatment Minutes: 25 Minutes       Christopher Grey, PT

## 2024-12-06 LAB
ANION GAP SERPL CALCULATED.3IONS-SCNC: 10 MMOL/L (ref 3–16)
ANION GAP SERPL CALCULATED.3IONS-SCNC: 10 MMOL/L (ref 3–16)
BUN SERPL-MCNC: 3 MG/DL (ref 7–20)
BUN SERPL-MCNC: 4 MG/DL (ref 7–20)
CALCIUM SERPL-MCNC: 8.7 MG/DL (ref 8.3–10.6)
CALCIUM SERPL-MCNC: 8.7 MG/DL (ref 8.3–10.6)
CHLORIDE SERPL-SCNC: 101 MMOL/L (ref 99–110)
CHLORIDE SERPL-SCNC: 96 MMOL/L (ref 99–110)
CO2 SERPL-SCNC: 22 MMOL/L (ref 21–32)
CO2 SERPL-SCNC: 25 MMOL/L (ref 21–32)
CREAT SERPL-MCNC: 0.5 MG/DL (ref 0.8–1.3)
CREAT SERPL-MCNC: 0.5 MG/DL (ref 0.8–1.3)
EST. AVERAGE GLUCOSE BLD GHB EST-MCNC: 102.5 MG/DL
GFR SERPLBLD CREATININE-BSD FMLA CKD-EPI: >90 ML/MIN/{1.73_M2}
GFR SERPLBLD CREATININE-BSD FMLA CKD-EPI: >90 ML/MIN/{1.73_M2}
GLUCOSE SERPL-MCNC: 110 MG/DL (ref 70–99)
GLUCOSE SERPL-MCNC: 113 MG/DL (ref 70–99)
HBA1C MFR BLD: 5.2 %
HIV 1+2 AB+HIV1 P24 AG SERPL QL IA: NORMAL
HIV 2 AB SERPL QL IA: NORMAL
HIV1 AB SERPL QL IA: NORMAL
HIV1 P24 AG SERPL QL IA: NORMAL
MAGNESIUM SERPL-MCNC: 1.6 MG/DL (ref 1.8–2.4)
MAGNESIUM SERPL-MCNC: 1.73 MG/DL (ref 1.8–2.4)
POTASSIUM SERPL-SCNC: 2.9 MMOL/L (ref 3.5–5.1)
POTASSIUM SERPL-SCNC: 3.3 MMOL/L (ref 3.5–5.1)
REAGIN+T PALLIDUM IGG+IGM SERPL-IMP: NORMAL
SODIUM SERPL-SCNC: 131 MMOL/L (ref 136–145)
SODIUM SERPL-SCNC: 133 MMOL/L (ref 136–145)

## 2024-12-06 PROCEDURE — 6360000002 HC RX W HCPCS: Performed by: NURSE PRACTITIONER

## 2024-12-06 PROCEDURE — 95819 EEG AWAKE AND ASLEEP: CPT

## 2024-12-06 PROCEDURE — 97530 THERAPEUTIC ACTIVITIES: CPT

## 2024-12-06 PROCEDURE — 94760 N-INVAS EAR/PLS OXIMETRY 1: CPT

## 2024-12-06 PROCEDURE — 36415 COLL VENOUS BLD VENIPUNCTURE: CPT

## 2024-12-06 PROCEDURE — 1200000000 HC SEMI PRIVATE

## 2024-12-06 PROCEDURE — 2580000003 HC RX 258

## 2024-12-06 PROCEDURE — 80048 BASIC METABOLIC PNL TOTAL CA: CPT

## 2024-12-06 PROCEDURE — 51701 INSERT BLADDER CATHETER: CPT

## 2024-12-06 PROCEDURE — 6370000000 HC RX 637 (ALT 250 FOR IP): Performed by: STUDENT IN AN ORGANIZED HEALTH CARE EDUCATION/TRAINING PROGRAM

## 2024-12-06 PROCEDURE — 51798 US URINE CAPACITY MEASURE: CPT

## 2024-12-06 PROCEDURE — 83735 ASSAY OF MAGNESIUM: CPT

## 2024-12-06 PROCEDURE — 6360000002 HC RX W HCPCS

## 2024-12-06 PROCEDURE — 6370000000 HC RX 637 (ALT 250 FOR IP): Performed by: NURSE PRACTITIONER

## 2024-12-06 RX ORDER — HALOPERIDOL 5 MG/ML
5 INJECTION INTRAMUSCULAR ONCE
Status: DISCONTINUED | OUTPATIENT
Start: 2024-12-06 | End: 2024-12-12 | Stop reason: HOSPADM

## 2024-12-06 RX ADMIN — POTASSIUM CHLORIDE 10 MEQ: 7.46 INJECTION, SOLUTION INTRAVENOUS at 16:10

## 2024-12-06 RX ADMIN — POTASSIUM CHLORIDE 10 MEQ: 7.46 INJECTION, SOLUTION INTRAVENOUS at 17:41

## 2024-12-06 RX ADMIN — Medication 6 MG: at 21:00

## 2024-12-06 RX ADMIN — POTASSIUM CHLORIDE 10 MEQ: 7.46 INJECTION, SOLUTION INTRAVENOUS at 19:13

## 2024-12-06 RX ADMIN — POTASSIUM CHLORIDE 10 MEQ: 7.46 INJECTION, SOLUTION INTRAVENOUS at 14:24

## 2024-12-06 RX ADMIN — WATER 1000 MG: 1 INJECTION INTRAMUSCULAR; INTRAVENOUS; SUBCUTANEOUS at 20:59

## 2024-12-06 RX ADMIN — THIAMINE HYDROCHLORIDE 200 MG: 100 INJECTION, SOLUTION INTRAMUSCULAR; INTRAVENOUS at 05:41

## 2024-12-06 RX ADMIN — SODIUM CHLORIDE, PRESERVATIVE FREE 10 ML: 5 INJECTION INTRAVENOUS at 21:07

## 2024-12-06 RX ADMIN — THIAMINE HYDROCHLORIDE 200 MG: 100 INJECTION, SOLUTION INTRAMUSCULAR; INTRAVENOUS at 12:37

## 2024-12-06 RX ADMIN — POTASSIUM CHLORIDE 10 MEQ: 7.46 INJECTION, SOLUTION INTRAVENOUS at 12:38

## 2024-12-06 RX ADMIN — ENOXAPARIN SODIUM 40 MG: 100 INJECTION SUBCUTANEOUS at 09:11

## 2024-12-06 RX ADMIN — Medication 2000 UNITS: at 09:11

## 2024-12-06 RX ADMIN — SODIUM CHLORIDE, PRESERVATIVE FREE 10 ML: 5 INJECTION INTRAVENOUS at 09:11

## 2024-12-06 RX ADMIN — THIAMINE HYDROCHLORIDE 200 MG: 100 INJECTION, SOLUTION INTRAMUSCULAR; INTRAVENOUS at 21:00

## 2024-12-06 RX ADMIN — FOLIC ACID 1 MG: 1 TABLET ORAL at 09:11

## 2024-12-06 NOTE — PROGRESS NOTES
V2.0  [facility] [place] Hospitalist Progress Note      Name:  Chavez Hyatt /Age/Sex: 1960  (64 y.o. male)   MRN & CSN:  [id number] & [id number] Encounter Date/Time: 2024 9:29 AM EST    Location:  [id number] PCP: Gladys, Pcp       Hospital Day: 5    Assessment and Plan:   Chavez Hyatt is a 64 y.o. male presenting with progressive       Plan:  Acute metabolic encephalopathy  -CT head negative  -TSH elevated with mildly decreased T4.  While hypothyroidism can cause alteration in mental status, would expect a more severe level to be causing significant symptoms.  Patient also without other symptoms of hypothyroidism  -lactic acidosis could be contributing  -MRI brain without acute abnormality  -Patient more confused today  -Discussed with neuro : Recommended more aggressive repletion of patient's vitamin deficiencies, D/B1/Folate.  Patient's strength on physical exam is actually quite good so his frequent falls may actually be a manifestation of impaired sensation.  Metabolic changes could have precipitated the more recent acute progression.  Recommended EEG, although acknowledged may not be particularly informative  -Depression could also less than similar, but would be more difficult to prove  -Rule out a more chronic cognitive impairment  -Discussed with patient's sister, Coty.  Updated her on current plan of care.  Questions were answered.  She will stating she would be out of town over the weekend and that we could reach out to the other sister if needed  Acute cystitis  -UA with + nitrites, but not overly impressive for infection. Urine culture pending  -Antibiotics with Rocephin  Lactic acidosis due to infection and Dehydration  -Fluid resuscitation, normalized  Fall with head trauma  Generalized weakness  -Family reports gradually worsening weakness over the last week and has fallen 5 times.  Hit his head without loss of consciousness  -Strength actually fairly good on physical exam.  As above  may be more of a sensory issue  -CT head negative as above  -PT OT recommending SNF      Ppx: [facility]  Dispo: SNF; Tentative plan for Discharge tomorrow pending EEG results.    Subjective:     Chief Complaint: Altered Mental Status (Pt comes in [facility] with c/o altered mental status. Family present and and states that pt has been falling a lot this week and that pt is not eating and drinking well. Pts skin is dry, abdomen is distended, pts blood sugar per emt was 96. Pt has no c/o pain at this time. Pt is alert and oriented x2. )     Interval Hx:  Patient pleasant and cooperative.  Alert to self, place, and time.  Out of Posey vest.  So far has been slightly less impulsive today compared to yesterday's evaluation.  Reminded patient to ask for staff assistance when needing to get up out of bed as he is very unsteady.  Patient unable to recall falling multiple times yesterday.    Review of Systems:    Review of Systems    10 point ROS negative except as stated above in \"subjective\" section    Objective:     Intake/Output Summary (Last 24 hours) at 12/6/2024 0818  Last data filed at 12/6/2024 0629  Gross per 24 hour   Intake 420 ml   Output 1425 ml   Net -1005 ml        Vitals:   Vitals:    12/06/24 0812   BP:    Pulse:    Resp:    Temp:    SpO2: 95%       Physical Exam:     General: NAD  Eyes: EOMI  ENT: neck supple  Cardiovascular: Regular rate.  Respiratory: Clear to auscultation  Gastrointestinal: Soft, non tender  Genitourinary: no suprapubic tenderness  Musculoskeletal: No edema  Skin: warm, dry; healing abrasion on left forehead  Neuro: Alert.  CN II through XII 5/5 strength in bilateral upper and lower extremities  Psych: Pleasant, and cooperative.  Oriented to person and time, not to place    Medications:   Medications:    haloperidol lactate  5 mg IntraMUSCular Once    Vitamin D  2,000 Units Oral Daily    [START ON 12/9/2024] thiamine (B-1) 250 mg in sodium chloride 0.9 % 100 mL IVPB  250 mg

## 2024-12-06 NOTE — PROGRESS NOTES
Comprehensive Nutrition Assessment    Type and Reason for Visit:  Reassess    Nutrition Recommendations/Plan:   Continue current diet  Reassess need/acceptance of ONS     Malnutrition Assessment:  Malnutrition Status:  Moderate malnutrition (12/06/24 1636)    Context:  Acute Illness     Findings of the 6 clinical characteristics of malnutrition:  Energy Intake:  75% or less of estimated energy requirements for 7 or more days  Weight Loss:  Greater than 5% over 1 month     Body Fat Loss:  No body fat loss     Muscle Mass Loss:  No muscle mass loss    Fluid Accumulation:  No fluid accumulation     Strength:  Not Performed    Nutrition Assessment:    Variable PO intake, improving. Poor acceptence of ONS with pt confusion. Will continue to trial acceptence with improved mentation. Neuro recs aggressive repletion of vitamin deficiencies B1, thiamine, Vit D, folic acid. Wt loss 13% over 1 week, significant. Will continue to monitor trend.    Nutrition Related Findings:    BG 74-110mg/dL, K 2.9. Na 131, BUN 3, Creat 0.5 Mg 1.6. Wound Type: None       Current Nutrition Intake & Therapies:    Average Meal Intake: 1-25%, 26-50%, 51-75%  Average Supplements Intake: None Ordered  ADULT DIET; Regular  ADULT ORAL NUTRITION SUPPLEMENT; Lunch, Dinner; Frozen Oral Supplement    Anthropometric Measures:  Height: 182.9 cm (6' 0.01\")  Ideal Body Weight (IBW): 178 lbs (81 kg)       Current Body Weight: 79.9 kg (176 lb 2.4 oz), 104.4 % IBW.    Current BMI (kg/m2): 23.9                             BMI Categories: Normal Weight (BMI 22.0 to 24.9) age over 65    Estimated Daily Nutrient Needs:  Energy Requirements Based On: Kcal/kg  Weight Used for Energy Requirements: Current  Energy (kcal/day): 0311-1264 kcal (20-25 kcal/kg)  Weight Used for Protein Requirements: Current  Protein (g/day):  g (1-1.2g/kg)  Method Used for Fluid Requirements: 1 ml/kcal  Fluid (ml/day): 360-660ml    Nutrition Diagnosis:   Inadequate oral intake

## 2024-12-06 NOTE — PROCEDURES
[facility]          [address] [place] [address]                       ELECTROENCEPHALOGRAM REPORT      PATIENT NAME: HEIDI MCKINNON               : 1960  MED REC NO: [id number]                      ROOM: [place] [address]  ACCOUNT NO: [id number]                       ADMIT DATE: 2024  PROVIDER: Damion Suarez DO      DATE OF SERVICE:  2024    REFERRING PHYSICIAN:  Flori Logan NP    REASON FOR STUDY:  Rapid encephalopathy, fall.    BRIEF HISTORY AND NEUROLOGIC FINDINGS:  The patient is a 64-year-old male being evaluated for reported encephalopathy.    MEDICATIONS:  The patient's centrally acting medications listed include Haldol and melatonin.    EEG FINDINGS:  This is a 20-channel digital EEG performed utilizing bipolar and referential montages.  Wakefulness, drowsiness, and stage 2 sleep were obtained during the recording.  During maximal wakefulness, there was a moderate voltage, relatively symmetric, somewhat disorganized though reactive 7 to 8 hertz posterior dominant background rhythm.  The anterior background consisted of low-to-moderate voltage fast frequencies.  Drowsiness is manifested by attenuation of the waking background rhythms.  Sleep is manifested by sleep spindles and K-complexes.    Photic stimulation was performed at various flash frequencies and produced asymmetric posterior driving response at multiple frequencies.  Hyperventilation exercise was not performed due to the patient's age.    A 1-channel EKG rhythm strip was reviewed and showed heart rates typically around 100 beats per minute without obvious dysrhythmia.    EEG DIAGNOSIS:  Slightly abnormal awake and asleep EEG secondary to minimal background slowing and disorganization.    CLINICAL INTERPRETATION:  The background slowing and disorganization are nonspecific, though consistent with minimal encephalopathy.  This may be seen in multiple settings including toxic,

## 2024-12-06 NOTE — PROGRESS NOTES
Patient did not pass urine the whole shift. Bladder scan done as ordered, scan showed 830ml of urine. Straight cath done as ordered. Removed 725ml of urine. Repeated bladder scan 0ml.

## 2024-12-06 NOTE — PROGRESS NOTES
functional info. pt reports he lives alone. This date, pt functioning below baseline. Pt more alert this date, yet still confused. Pt requiring up to modA bed mobility, SBA/close CGA sitting balance seated EOB, Min/modA x1 fxl transfers via STEDY. pt stands in STEDY with Agustín and completes at least 10x marches in place with Agustín (no noted knee buckling at this time). pt returned to bed this date 2/2 safety concerns as pt had 2 falls yesterday in hospital on 12/5. pt does not complete ADLs this date but anticipate pt to require up to min/modA for all ADLs based on strength, balance, endurance, ROM and cognition observed this date. cont acute OT to address above deficits. anticipate pt will require ongoing skilled OT at discharge in order to maximize pt's safety and independence.  Treatment Diagnosis: decreased fxl mobility/ADLs/cognition/balance  Prognosis: Fair  History: see above  REQUIRES OT FOLLOW-UP: Yes  Activity Tolerance  Activity Tolerance: Treatment limited secondary to decreased cognition;Patient Tolerated treatment well     Plan  Occupational Therapy Plan  Times Per Week: 3-5x  Current Treatment Recommendations: Strengthening, Balance training, Functional mobility training, Endurance training, Gait training, Patient/Caregiver education & training, Cognitive reorientation, Equipment evaluation, education, & procurement, Safety education & training, Self-Care / ADL    Restrictions  Restrictions/Precautions  Restrictions/Precautions: Fall Risk  Position Activity Restriction  Other Position/Activity Restrictions: Pt noted to have fallen twice on 12/5    Subjective  General  Chart Reviewed: Yes  Patient assessed for rehabilitation services?: Yes  Additional Pertinent Hx: pt is a 65 yo male admitted with AMS.  Response to previous treatment: Patient with no complaints from previous session  Family / Caregiver Present: No  Referring Practitioner: Kacy Daniel MD  Diagnosis:  mobility to LRAD in prep to complete ADL routine  Short Term Goal 5: Pt will tolerate standing x1-2 minutes with Agustín to complete ADL task to improve balance/endurance  Long Term Goals  Time Frame for Long Term Goals : STGs=LTGs  Patient Goals   Patient goals : pt did not state    Therapy Time   Individual Concurrent Group Co-treatment   Time In 0925         Time Out 0950         Minutes 25         Timed Code Treatment Minutes: 25 Minutes     Electronically signed by DELTA Carpenter on 12/6/2024 at 9:55 AM

## 2024-12-06 NOTE — PROGRESS NOTES
mobility. Recommend continued skilled therapy 3-5x/week.  Treatment Diagnosis: impaired mobility  Therapy Prognosis: Fair  Decision Making: High Complexity  History: see above  Exam: see below  Clinical Presentation: evolving  Barriers to Learning: cognition  Activity Tolerance  Activity Tolerance: Patient tolerated treatment well;Treatment limited secondary to decreased cognition    Plan  Physical Therapy Plan  General Plan: 3-5 times per week  Current Treatment Recommendations: Strengthening, Balance training, Functional mobility training, Transfer training, Endurance training, Neuromuscular re-education, Gait training, Patient/Caregiver education & training, Safety education & training, Equipment evaluation, education, & procurement, Therapeutic activities  Safety Devices  Type of Devices: All fall risk precautions in place, Call light within reach, Patient at risk for falls, Nurse notified, Gait belt, Bed alarm in place, Left in bed, Sitter present, Telesitter in use    Restrictions  Restrictions/Precautions  Restrictions/Precautions: Fall Risk  Position Activity Restriction  Other Position/Activity Restrictions: Pt noted to have fallen twice on 12/5     Subjective  General  Chart Reviewed: Yes  Patient assessed for rehabilitation services?: Yes  Additional Pertinent Hx: Chavez Hyatt is a 64 y.o. male with no pmh who presents to the ED on 12/2/24 with AMS.  Response To Previous Treatment: Patient with no complaints from previous session.  Family/Caregiver Present: No  Referring Practitioner: Kacy Daniel MD  Referral Date : 12/03/24  Diagnosis: AMS  Follows Commands: Within Functional Limits  Subjective  Subjective: Pt is agreeable to PT.         Social/Functional History  Social/Functional History  Lives With: Alone  Type of Home: Apartment  Home Layout: One level  Home Access: Level entry  Bathroom Shower/Tub: Tub/Shower unit  Bathroom Toilet: Standard  Prior Level of Assist for ADLs:  is needed climbing 3-5 steps with a railing?: Total  AM-PAC Inpatient Mobility Raw Score : 13  AM-PAC Inpatient T-Scale Score : 36.74  Mobility Inpatient CMS 0-100% Score: 64.91  Mobility Inpatient CMS G-Code Modifier : CL         Goals  Short Term Goals  Time Frame for Short Term Goals: by acute discharge - all goals ongoing as of 12/6/24  Short Term Goal 1: bed mobility mod I  Short Term Goal 2: sit<>Stand SBA  Short Term Goal 3: ambulate > 50' with SBA  Patient Goals   Patient Goals : none stated       Education  Patient Education  Education Given To: Patient  Education Provided: Role of Therapy;Plan of Care  Education Method: Verbal  Barriers to Learning: Cognition  Education Outcome: Continued education needed      Therapy Time   Individual Concurrent Group Co-treatment   Time In 0925         Time Out 0950         Minutes 25         Timed Code Treatment Minutes: 25 Minutes       Christopher Grey, PT

## 2024-12-06 NOTE — PLAN OF CARE
Problem: Discharge Planning  Goal: Discharge to home or other facility with appropriate resources  12/6/2024 0137 by Aparna Martinez RN  Outcome: Progressing  12/5/2024 1720 by Michelle Glover RN  Outcome: Progressing     Problem: Safety - Adult  Goal: Free from fall injury  12/6/2024 0137 by Aparna Martinez RN  Outcome: Progressing  12/5/2024 1720 by Michelle Glover RN  Outcome: Progressing     Problem: Skin/Tissue Integrity  Goal: Absence of new skin breakdown  Description: 1.  Monitor for areas of redness and/or skin breakdown  2.  Assess vascular access sites hourly  3.  Every 4-6 hours minimum:  Change oxygen saturation probe site  4.  Every 4-6 hours:  If on nasal continuous positive airway pressure, respiratory therapy assess nares and determine need for appliance change or resting period.  12/6/2024 0137 by Aparna Martinez RN  Outcome: Progressing  12/5/2024 1720 by Michelle Glover RN  Outcome: Progressing     Problem: Nutrition Deficit:  Goal: Optimize nutritional status  12/6/2024 0137 by Aparna Martinez RN  Outcome: Progressing  12/5/2024 1720 by Michelle Glover RN  Outcome: Progressing     Problem: Neurosensory - Adult  Goal: Achieves stable or improved neurological status  12/6/2024 0137 by Aparna Martinez RN  Outcome: Progressing  12/5/2024 1720 by Michelle Glover RN  Outcome: Progressing     Problem: Skin/Tissue Integrity - Adult  Goal: Skin integrity remains intact  12/6/2024 0137 by Aparna Martinez RN  Outcome: Progressing  12/5/2024 1720 by Michelle Glover RN  Outcome: Progressing  Goal: Incisions, wounds, or drain sites healing without S/S of infection  12/6/2024 0137 by Aparna Martinez RN  Outcome: Progressing  12/5/2024 1720 by Michelle Glover RN  Outcome: Progressing     Problem: Musculoskeletal - Adult  Goal: Return mobility to safest level of function  12/6/2024 0137 by Aparna Martinez RN  Outcome: Progressing  12/5/2024 1720 by Michelle Glover RN  Outcome: Progressing     Problem:

## 2024-12-06 NOTE — PLAN OF CARE
Problem: Discharge Planning  Goal: Discharge to home or other facility with appropriate resources  12/6/2024 1326 by Samantha Tello RN  Outcome: Progressing     Problem: Safety - Adult  Goal: Free from fall injury  12/6/2024 1326 by Samantha Tello RN  Outcome: Progressing     Problem: Skin/Tissue Integrity  Goal: Absence of new skin breakdown  Description: 1.  Monitor for areas of redness and/or skin breakdown  2.  Assess vascular access sites hourly  3.  Every 4-6 hours minimum:  Change oxygen saturation probe site  4.  Every 4-6 hours:  If on nasal continuous positive airway pressure, respiratory therapy assess nares and determine need for appliance change or resting period.  12/6/2024 1326 by Samantha Tello RN  Outcome: Progressing     Problem: Nutrition Deficit:  Goal: Optimize nutritional status  12/6/2024 0137 by Aparna Martinez RN  Outcome: Progressing     Problem: Neurosensory - Adult  Goal: Achieves stable or improved neurological status  12/6/2024 1326 by Samantha Tello RN  Outcome: Progressing

## 2024-12-07 LAB
ANION GAP SERPL CALCULATED.3IONS-SCNC: 10 MMOL/L (ref 3–16)
BUN SERPL-MCNC: 3 MG/DL (ref 7–20)
CALCIUM SERPL-MCNC: 8.6 MG/DL (ref 8.3–10.6)
CHLORIDE SERPL-SCNC: 103 MMOL/L (ref 99–110)
CO2 SERPL-SCNC: 25 MMOL/L (ref 21–32)
CREAT SERPL-MCNC: 0.6 MG/DL (ref 0.8–1.3)
GFR SERPLBLD CREATININE-BSD FMLA CKD-EPI: >90 ML/MIN/{1.73_M2}
GLUCOSE SERPL-MCNC: 90 MG/DL (ref 70–99)
POTASSIUM SERPL-SCNC: 3.7 MMOL/L (ref 3.5–5.1)
SODIUM SERPL-SCNC: 138 MMOL/L (ref 136–145)

## 2024-12-07 PROCEDURE — 6360000002 HC RX W HCPCS

## 2024-12-07 PROCEDURE — 1200000000 HC SEMI PRIVATE

## 2024-12-07 PROCEDURE — 51701 INSERT BLADDER CATHETER: CPT

## 2024-12-07 PROCEDURE — 6370000000 HC RX 637 (ALT 250 FOR IP): Performed by: STUDENT IN AN ORGANIZED HEALTH CARE EDUCATION/TRAINING PROGRAM

## 2024-12-07 PROCEDURE — 2580000003 HC RX 258

## 2024-12-07 PROCEDURE — 6360000002 HC RX W HCPCS: Performed by: NURSE PRACTITIONER

## 2024-12-07 PROCEDURE — 6370000000 HC RX 637 (ALT 250 FOR IP)

## 2024-12-07 PROCEDURE — 36415 COLL VENOUS BLD VENIPUNCTURE: CPT

## 2024-12-07 PROCEDURE — 94760 N-INVAS EAR/PLS OXIMETRY 1: CPT

## 2024-12-07 PROCEDURE — 51798 US URINE CAPACITY MEASURE: CPT

## 2024-12-07 PROCEDURE — 80048 BASIC METABOLIC PNL TOTAL CA: CPT

## 2024-12-07 PROCEDURE — 6360000002 HC RX W HCPCS: Performed by: HOSPITALIST

## 2024-12-07 RX ORDER — MAGNESIUM SULFATE IN WATER 40 MG/ML
2000 INJECTION, SOLUTION INTRAVENOUS ONCE
Status: COMPLETED | OUTPATIENT
Start: 2024-12-07 | End: 2024-12-07

## 2024-12-07 RX ADMIN — FOLIC ACID 1 MG: 1 TABLET ORAL at 10:11

## 2024-12-07 RX ADMIN — POTASSIUM CHLORIDE 40 MEQ: 20 TABLET, EXTENDED RELEASE ORAL at 02:30

## 2024-12-07 RX ADMIN — MAGNESIUM SULFATE HEPTAHYDRATE 2000 MG: 40 INJECTION, SOLUTION INTRAVENOUS at 13:50

## 2024-12-07 RX ADMIN — THIAMINE HYDROCHLORIDE 200 MG: 100 INJECTION, SOLUTION INTRAMUSCULAR; INTRAVENOUS at 05:55

## 2024-12-07 RX ADMIN — ENOXAPARIN SODIUM 40 MG: 100 INJECTION SUBCUTANEOUS at 10:12

## 2024-12-07 RX ADMIN — THIAMINE HYDROCHLORIDE 200 MG: 100 INJECTION, SOLUTION INTRAMUSCULAR; INTRAVENOUS at 23:46

## 2024-12-07 RX ADMIN — THIAMINE HYDROCHLORIDE 200 MG: 100 INJECTION, SOLUTION INTRAMUSCULAR; INTRAVENOUS at 13:50

## 2024-12-07 RX ADMIN — Medication 2000 UNITS: at 10:11

## 2024-12-07 RX ADMIN — SODIUM CHLORIDE, PRESERVATIVE FREE 10 ML: 5 INJECTION INTRAVENOUS at 20:21

## 2024-12-07 NOTE — PROGRESS NOTES
Progress Note  The patient is being evaluated for AMS; weakness.     Updates  Folate essentially undetectable.   No more falls.   Continues to be confused but mental status a bit improved.     Has been working with therapy. Some improvement is reported.     Active Ambulatory Problems     Diagnosis Date Noted    Frequent falls 10/13/2022    Fall at home, initial encounter 10/14/2022     Resolved Ambulatory Problems     Diagnosis Date Noted    No Resolved Ambulatory Problems     No Additional Past Medical History       Current Facility-Administered Medications:     magnesium sulfate 2000 mg in 50 mL IVPB premix, 2,000 mg, IntraVENous, Once, Jeannine Wilson MD, Last Rate: 25 mL/hr at 12/07/24 1350, 2,000 mg at 12/07/24 1350    haloperidol lactate (HALDOL) injection 5 mg, 5 mg, IntraMUSCular, Once, Remington Rosas MD    Vitamin D (CHOLECALCIFEROL) tablet 2,000 Units, 2,000 Units, Oral, Daily, Harjit Bates MD, 2,000 Units at 12/07/24 1011    [START ON 12/9/2024] thiamine (B-1) 250 mg in sodium chloride 0.9 % 100 mL IVPB, 250 mg, IntraVENous, Daily, Flori Logan APRN - CNP    thiamine (B-1) injection 200 mg, 200 mg, IntraVENous, 3 times per day, Flori Logan APRN - CNP, 200 mg at 12/07/24 1350    folic acid (FOLVITE) tablet 1 mg, 1 mg, Oral, Daily, Harjit Bates MD, 1 mg at 12/07/24 1011    melatonin tablet 6 mg, 6 mg, Oral, Nightly PRN, Mira Hernandez APRN - CNP, 6 mg at 12/06/24 2100    nicotine (NICODERM CQ) 7 MG/24HR 1 patch, 1 patch, TransDERmal, Daily, Harjit Bates MD, 1 patch at 12/07/24 1010    sodium chloride flush 0.9 % injection 5-40 mL, 5-40 mL, IntraVENous, 2 times per day, Kacy Daniel MD, 10 mL at 12/06/24 2107    sodium chloride flush 0.9 % injection 5-40 mL, 5-40 mL, IntraVENous, PRN, Kacy Daniel MD, 10 mL at 12/04/24 0817    0.9 % sodium chloride infusion, , IntraVENous, PRN, Kacy Daniel MD    potassium chloride (KLOR-CON M) extended release tablet 40 mEq, 40  may be seen in multiple settings including toxic, metabolic, or degenerative conditions as well as with medication effect.  No definite epileptiform activity was present during this recording.  If seizures remain a clinical concern, then a repeat EEG may be of further benefit.  Clinical correlation is advised.     MRI Brain w/ w/o 12/5/24:   IMPRESSION:  Chronic microvascular disease without acute intracranial abnormality.  No  abnormal postcontrast enhancement.     CT C spine w/o 12/2/24:   IMPRESSION:  No acute fracture or traumatic subluxation of the cervical spine.        Impression  1. Acute to subacute progressive encephalopathy  2. Recurrent falls  3. Severe Vitamin D deficiency  4. EtOH longstanding abuse   5. UTI  6. Abnormal thyroid studies  7. Severe Vitamin D deficiency.         Chavez Hyatt is a 64 y.o. male with long standing, significant EtOH abuse, hx of head trauma. He lives with his sister at baseline, typically needs encouragement/reminders to shower, change clothes. Does not go to the doctor or take any medications. Otherwise is known to be alert and oriented, conversational, aware of current events. Ambulatory without assistive devices. In the last month has had progressive decreased in appetite, PO intake, quieter than normal.     Acutely worsened the last week with abundance of falls, quieter than normal, confusion. Sister has been progressively decreasing his EtOH intake over the last many months. No known loss of consciousness or seizure like activity.      Work up thus far has been revealing for abnormal thyroid studies, severe vitamin D & folate deficiency. UA with concerns for  possible UTI. CT Head and MRI brain negative for acute etiology.  Chronic microvascular changes and some atrophy noted. EEG consistent with mild non specific encephalopathy; no epileptiform discharges.     Clinically is showing some improvement in mentation. No more falls with some progression noted in review of

## 2024-12-07 NOTE — PROGRESS NOTES
PT CALLING TO SEE IF FANTASMA WOULD CALL HIM - HE STATES HE SENT A [employer] MESSAGE AND HAS MORE TO TELL HER - TO MUCH TO TYPE    PT @  [phone] Patient woke up confused, stated he thought he was   \"in the [place] in [place].\"  After reorienting patient once to location and situation, he has been able to correctly state he is at the hospital. Patient has not tried to get out of bed today . Located the call light and states \"I push this button if I need you. If I have to go to the bathroom I will hold it.\"     Sitter and telecamera discontinued due to improved altered mental status.   Bed alarm and nonskid footwear in place. Patient close to nurses station. Bedside table in reach.

## 2024-12-07 NOTE — CARE COORDINATION
Pt has a sitter at bedside. Spoke with Josiah Shukla. Pt will need to be sitter free for 48 hours prior being accepted to SNF.    Notified RN.    Electronically signed by Amarilys Bush RN MSN on 12/7/2024 at 11:15 AM

## 2024-12-07 NOTE — PROGRESS NOTES
2030: Patient's potassium IV infusion is completed. Lab recheck is ordered per protocol. Patient tolerated well. Patient is bladder scanned at this time. Bladder scan shows 124ml at this time. Patient is alert and oriented x 4 at this moment, having a conversation with nurse that is bedside. Patient is requesting to go outside to smoke. Patient is informed of hospital policy in regards of smoking. Patient is offered to have nicotine patch placed. Patient states \"he will think about it.\" Patient has sitter bedside. Patient is offered fluids at this time. Call light is provided at this time. Patient is laying in bed, watching television. Bed alarm is activated, bed is locked in lowest position with three armrails up. Gripper socks are placed, fall bracelet is in place.     Electronically signed by Roxy Zamudio RN on 12/6/2024 at 8:38 PM

## 2024-12-07 NOTE — PROGRESS NOTES
Patient is not voiding on his own. Encouraged patient to use urinal, unable to void. Straight cath done 538 ml. Chart review shows straight cath done qshift.   Patient pleasantly confused, redirectable. States he is at [facility] and his sister is going to take him home today .    Reoriented patient that he will be discharged to [facility] probably Monday.

## 2024-12-07 NOTE — PROGRESS NOTES
V2.0  [facility] Hospitalist Progress Note      Name:  Chavez Hyatt /Age/Sex: 1960  (64 y.o. male)   MRN & CSN:  [id number] & [id number] Encounter Date/Time: 2024 11:02 AM EST    Location:  [id number] PCP: Gladys, Pcp       Hospital Day: 6  Subjective:   Chief Complaint: Follow-up encephalopathy    Patient seen and evaluated the bedside, he states that he still feels a little foggy however he is able to answer questions appropriately, some amnesia noted, he thought he had his breakfast bedside at the bedside reported that he did not have his breakfast.  Reportedly still unsteady on his feet    Review of Systems:    Review of Systems    10 point ROS negative except as stated above in \"subjective\" section    Objective:     Intake/Output Summary (Last 24 hours) at 2024 1102  Last data filed at 2024 0637  Gross per 24 hour   Intake 972.22 ml   Output 1220 ml   Net -247.78 ml      Vitals:   Vitals:    24 0728   BP: 109/76   Pulse: 87   Resp: 16   Temp: 97.4 °F (36.3 °C)   SpO2: 97%     Physical Exam:   General: Awake, alert and oriented, NAD  Cardiovascular: S1S2 present, regular rate and rhythm, no murmurs  Respiratory: Clear to auscultation  Gastrointestinal: Soft, non tender, positive bowel sounds   Genitourinary: no suprapubic tenderness  Musculoskeletal: No edema  Neuro: Alert.,  Nonfocal exam    Medications:     Medications:    haloperidol lactate  5 mg IntraMUSCular Once    Vitamin D  2,000 Units Oral Daily    [START ON 2024] thiamine (B-1) 250 mg in sodium chloride 0.9 % 100 mL IVPB  250 mg IntraVENous Daily    thiamine  200 mg IntraVENous 3 times per day    folic acid  1 mg Oral Daily    nicotine  1 patch TransDERmal Daily    sodium chloride flush  5-40 mL IntraVENous 2 times per day    enoxaparin  40 mg SubCUTAneous Daily      Infusions:    sodium chloride       PRN Meds: melatonin, 6 mg, Nightly PRN  sodium chloride flush, 5-40 mL, PRN  sodium chloride, , PRN  potassium

## 2024-12-07 NOTE — PLAN OF CARE
Problem: Discharge Planning  Goal: Discharge to home or other facility with appropriate resources  12/6/2024 2153 by Aparna Martinez RN  Outcome: Progressing  12/6/2024 1326 by Samantha Tello RN  Outcome: Progressing     Problem: Safety - Adult  Goal: Free from fall injury  12/6/2024 2153 by Aparna Martinez RN  Outcome: Progressing  12/6/2024 1326 by Samantha Tello RN  Outcome: Progressing     Problem: Skin/Tissue Integrity  Goal: Absence of new skin breakdown  Description: 1.  Monitor for areas of redness and/or skin breakdown  2.  Assess vascular access sites hourly  3.  Every 4-6 hours minimum:  Change oxygen saturation probe site  4.  Every 4-6 hours:  If on nasal continuous positive airway pressure, respiratory therapy assess nares and determine need for appliance change or resting period.  12/6/2024 2153 by Aparna Martinez RN  Outcome: Progressing  12/6/2024 1326 by Samantha Tello RN  Outcome: Progressing     Problem: Nutrition Deficit:  Goal: Optimize nutritional status  12/6/2024 2153 by Aparna Martinez RN  Outcome: Progressing  12/6/2024 1637 by Roxy Bailey, MS, RD, LD  Outcome: Progressing  Flowsheets (Taken 12/4/2024 0947 by Claribel Erickson RD)  Nutrient intake appropriate for improving, restoring, or maintaining nutritional needs:   Assess nutritional status and recommend course of action   Monitor oral intake, labs, and treatment plans   Recommend appropriate diets, oral nutritional supplements, and vitamin/mineral supplements   Order, calculate, and assess calorie counts as needed   Recommend, monitor, and adjust tube feedings and TPN/PPN based on assessed needs   Provide specific nutrition education to patient or family as appropriate  12/6/2024 1326 by Samantha Tello RN  Outcome: Progressing     Problem: Neurosensory - Adult  Goal: Achieves stable or improved neurological status  12/6/2024 2153 by Aparna Martinez RN  Outcome: Progressing  12/6/2024 1326 by Samantha Tello RN  Outcome:

## 2024-12-08 LAB
ANION GAP SERPL CALCULATED.3IONS-SCNC: 10 MMOL/L (ref 3–16)
BUN SERPL-MCNC: 4 MG/DL (ref 7–20)
CALCIUM SERPL-MCNC: 8.4 MG/DL (ref 8.3–10.6)
CHLORIDE SERPL-SCNC: 104 MMOL/L (ref 99–110)
CO2 SERPL-SCNC: 24 MMOL/L (ref 21–32)
CREAT SERPL-MCNC: 0.6 MG/DL (ref 0.8–1.3)
GFR SERPLBLD CREATININE-BSD FMLA CKD-EPI: >90 ML/MIN/{1.73_M2}
GLUCOSE SERPL-MCNC: 83 MG/DL (ref 70–99)
MAGNESIUM SERPL-MCNC: 2.2 MG/DL (ref 1.8–2.4)
POTASSIUM SERPL-SCNC: 3.6 MMOL/L (ref 3.5–5.1)
SODIUM SERPL-SCNC: 138 MMOL/L (ref 136–145)

## 2024-12-08 PROCEDURE — 83735 ASSAY OF MAGNESIUM: CPT

## 2024-12-08 PROCEDURE — 6360000002 HC RX W HCPCS

## 2024-12-08 PROCEDURE — 1200000000 HC SEMI PRIVATE

## 2024-12-08 PROCEDURE — 51701 INSERT BLADDER CATHETER: CPT

## 2024-12-08 PROCEDURE — 6370000000 HC RX 637 (ALT 250 FOR IP): Performed by: STUDENT IN AN ORGANIZED HEALTH CARE EDUCATION/TRAINING PROGRAM

## 2024-12-08 PROCEDURE — 2580000003 HC RX 258

## 2024-12-08 PROCEDURE — 51798 US URINE CAPACITY MEASURE: CPT

## 2024-12-08 PROCEDURE — 80048 BASIC METABOLIC PNL TOTAL CA: CPT

## 2024-12-08 PROCEDURE — 36415 COLL VENOUS BLD VENIPUNCTURE: CPT

## 2024-12-08 PROCEDURE — 6360000002 HC RX W HCPCS: Performed by: NURSE PRACTITIONER

## 2024-12-08 PROCEDURE — 94760 N-INVAS EAR/PLS OXIMETRY 1: CPT

## 2024-12-08 RX ADMIN — THIAMINE HYDROCHLORIDE 200 MG: 100 INJECTION, SOLUTION INTRAMUSCULAR; INTRAVENOUS at 05:23

## 2024-12-08 RX ADMIN — SODIUM CHLORIDE, PRESERVATIVE FREE 10 ML: 5 INJECTION INTRAVENOUS at 05:24

## 2024-12-08 RX ADMIN — SODIUM CHLORIDE, PRESERVATIVE FREE 10 ML: 5 INJECTION INTRAVENOUS at 10:05

## 2024-12-08 RX ADMIN — ENOXAPARIN SODIUM 40 MG: 100 INJECTION SUBCUTANEOUS at 10:02

## 2024-12-08 RX ADMIN — Medication 2000 UNITS: at 10:02

## 2024-12-08 RX ADMIN — SODIUM CHLORIDE, PRESERVATIVE FREE 10 ML: 5 INJECTION INTRAVENOUS at 21:16

## 2024-12-08 RX ADMIN — FOLIC ACID 1 MG: 1 TABLET ORAL at 10:02

## 2024-12-08 NOTE — PROGRESS NOTES
Discussed risks, benefits, and alternatives with patient.    Patient reason for declining turning treatment:     The following provider was notified of patient's intent to refuse: Dr. Wilson    Feedback from provider: No new orders    Alternative interventions used in the meantime: N/A    Electronically signed by Jililan Rosales RN on 12/8/2024 at 4:23 PM

## 2024-12-08 NOTE — PROGRESS NOTES
of the head/brain was performed without and with the administration of intravenous contrast. COMPARISON: CT head performed 12/05/2024. HISTORY: ORDERING SYSTEM PROVIDED HISTORY: AMS; progressive weakness TECHNOLOGIST PROVIDED HISTORY: Reason for exam:->AMS; progressive weakness Reason for Exam: AMS; progressive weakness FINDINGS: INTRACRANIAL STRUCTURES/VENTRICLES:  The sellar and suprasellar structures, optic chiasm, corpus callosum, pineal gland, tectum, and midline brainstem structures are unremarkable.  The craniocervical junction is unremarkable. There is no acute hemorrhage, mass effect, or midline shift.  There is satisfactory overall gray-white matter differentiation.  There is chronic microvascular disease.  The ventricular structures are symmetric and unremarkable.  The infratentorial structures including the cerebellopontine angles and internal auditory canals are unremarkable.  There is no abnormal restricted diffusion.  There is no abnormal blooming artifact on susceptibility weighted imaging.  No abnormal postcontrast enhancement. ORBITS: The visualized portion of the orbits demonstrate no acute abnormality. SINUSES: The visualized paranasal sinuses and mastoid air cells demonstrate no acute abnormality. BONES/SOFT TISSUES: The bone marrow signal intensity appears normal. The soft tissues demonstrate no acute abnormality.     Chronic microvascular disease without acute intracranial abnormality.  No abnormal postcontrast enhancement.     CT HEAD WO CONTRAST    Result Date: 12/5/2024  EXAMINATION: CT OF THE HEAD WITHOUT CONTRAST  12/5/2024 5:40 am TECHNIQUE: CT of the head was performed without the administration of intravenous contrast. Automated exposure control, iterative reconstruction, and/or weight based adjustment of the mA/kV was utilized to reduce the radiation dose to as low as reasonably achievable. COMPARISON: Approximately 14 hours earlier. HISTORY: ORDERING SYSTEM PROVIDED HISTORY:  hemorrhage, mass effect or midline shift.  No abnormal extra-axial fluid collection.  The gray-white differentiation is maintained without evidence of an acute infarct.  There is mild diffuse parenchymal atrophy.  Patchy bilateral periventricular and subcortical white matter hypodensities are nonspecific, but compatible with chronic microvascular ischemic change. ORBITS: The visualized portion of the orbits demonstrate no acute abnormality. SINUSES: The visualized paranasal sinuses and mastoid air cells demonstrate no acute abnormality. SOFT TISSUES/SKULL:  No acute abnormality of the visualized skull or soft tissues. CERVICAL SPINE BONES/ALIGNMENT: There is no acute fracture or traumatic malalignment. DEGENERATIVE CHANGES: No evidence of high-grade bony spinal canal stenosis. SOFT TISSUES: There is no prevertebral soft tissue swelling.     No acute intracranial abnormality. No acute fracture or traumatic subluxation of the cervical spine.     XR CHEST PORTABLE      No acute process.       Assessment and Plan:   Chavez Hyatt is a 64 y.o. male     Conditions under treatment:    # Acute versus subacute, metabolic encephalopathy  # Probable cystitis  # Fall, mechanical  # Generalized weakness  # History of heavy alcohol use  # Vitamin D deficiency  # Folate deficiency  # Abnormal thyroid profile  # Electrolyte derangements  # Urinary retention     Plan:     -Patient with multiple falls at home, workup negative for any fractures or dislocations or injuries, will need rehab however still needing a sitter and somewhat impulsive     -Patient with progressive worsening of cognition, seen by neurology, workup for acute process negative besides some vitamin deficiencies which may or may not explain the patient's presentation, I think is more of a subacute process related to his longstanding alcohol abuse.  EEG is negative for seizures, MRI is negative, CT head is negative.     -Continue working with PT/OT, will need

## 2024-12-08 NOTE — PROGRESS NOTES
Pt bed in lowest position, locked and alarm turned on. PCAs provided incontinence care while this RN was in the room. Pt reoriented to room and notified of staff change for the night. Pt in high spirits.   Electronically signed by KE BASHIR RN on 12/7/2024 at 7:37 PM    Pt asking about family members, states he is at [facility] - able to be redirected. Pt reminded to turn at least every 2 hours. Pt able to explain how to notify this RN if he needs anything - teach back on use of call light. Bed alarm on.   Electronically signed by KE BASHIR RN on 12/7/2024 at 8:23 PM

## 2024-12-08 NOTE — PLAN OF CARE
Problem: Safety - Adult  Goal: Free from fall injury  Outcome: Progressing     Problem: Skin/Tissue Integrity  Goal: Absence of new skin breakdown  Description: 1.  Monitor for areas of redness and/or skin breakdown  2.  Assess vascular access sites hourly  3.  Every 4-6 hours minimum:  Change oxygen saturation probe site  4.  Every 4-6 hours:  If on nasal continuous positive airway pressure, respiratory therapy assess nares and determine need for appliance change or resting period.  Outcome: Progressing     Problem: Nutrition Deficit:  Goal: Optimize nutritional status  Outcome: Progressing     Problem: Neurosensory - Adult  Goal: Achieves stable or improved neurological status  Outcome: Progressing     Problem: Skin/Tissue Integrity - Adult  Goal: Skin integrity remains intact  Outcome: Progressing  Flowsheets (Taken 12/7/2024 0929 by Cora Lemus, RN)  Skin Integrity Remains Intact:   Monitor for areas of redness and/or skin breakdown   Assess vascular access sites hourly   Every 4-6 hours minimum: Change oxygen saturation probe site  Goal: Incisions, wounds, or drain sites healing without S/S of infection  Outcome: Progressing  Flowsheets (Taken 12/7/2024 0929 by Cora Lemus, RN)  Incisions, Wounds, or Drain Sites Healing Without Sign and Symptoms of Infection:   ADMISSION and DAILY: Assess and document risk factors for pressure ulcer development   TWICE DAILY: Assess and document skin integrity   TWICE DAILY: Assess and document dressing/incision, wound bed, drain sites and surrounding tissue   Implement wound care per orders     Problem: Musculoskeletal - Adult  Goal: Return mobility to safest level of function  Outcome: Progressing     Problem: Gastrointestinal - Adult  Goal: Minimal or absence of nausea and vomiting  Outcome: Progressing  Goal: Maintains adequate nutritional intake  Outcome: Progressing     Problem: Genitourinary - Adult  Goal: Absence of urinary retention  Outcome: Progressing

## 2024-12-08 NOTE — PLAN OF CARE
Drain Sites Healing Without Sign and Symptoms of Infection:   ADMISSION and DAILY: Assess and document risk factors for pressure ulcer development   TWICE DAILY: Assess and document skin integrity   TWICE DAILY: Assess and document dressing/incision, wound bed, drain sites and surrounding tissue   Implement wound care per orders     Problem: Musculoskeletal - Adult  Goal: Return mobility to safest level of function  12/8/2024 1211 by Jillian Rosales RN  Outcome: Progressing  12/7/2024 2227 by Krista Hamm RN  Outcome: Progressing     Problem: Gastrointestinal - Adult  Goal: Minimal or absence of nausea and vomiting  12/8/2024 1211 by Jillian Rosales RN  Outcome: Progressing  12/7/2024 2227 by Krista Hamm RN  Outcome: Progressing  Goal: Maintains adequate nutritional intake  12/8/2024 1211 by Jillian Rosales RN  Outcome: Progressing  12/7/2024 2227 by Krista Hamm RN  Outcome: Progressing     Problem: Genitourinary - Adult  Goal: Absence of urinary retention  12/8/2024 1211 by Jillian Rosales RN  Outcome: Progressing  12/7/2024 2227 by Krista Hamm RN  Outcome: Progressing

## 2024-12-09 LAB
A-TOCOPHEROL VIT E SERPL-MCNC: 4.7 MG/L (ref 5.5–18)
BETA+GAMMA TOCOPHEROL SERPL-MCNC: 1.1 MG/L (ref 0–6)
VIT B6 SERPL-MCNC: 8.4 NMOL/L (ref 20–125)

## 2024-12-09 PROCEDURE — 2580000003 HC RX 258

## 2024-12-09 PROCEDURE — 2580000003 HC RX 258: Performed by: NURSE PRACTITIONER

## 2024-12-09 PROCEDURE — 1200000000 HC SEMI PRIVATE

## 2024-12-09 PROCEDURE — 97530 THERAPEUTIC ACTIVITIES: CPT

## 2024-12-09 PROCEDURE — 6370000000 HC RX 637 (ALT 250 FOR IP): Performed by: STUDENT IN AN ORGANIZED HEALTH CARE EDUCATION/TRAINING PROGRAM

## 2024-12-09 PROCEDURE — 51798 US URINE CAPACITY MEASURE: CPT

## 2024-12-09 PROCEDURE — 6370000000 HC RX 637 (ALT 250 FOR IP): Performed by: HOSPITALIST

## 2024-12-09 PROCEDURE — 51701 INSERT BLADDER CATHETER: CPT

## 2024-12-09 PROCEDURE — 6360000002 HC RX W HCPCS

## 2024-12-09 PROCEDURE — 6360000002 HC RX W HCPCS: Performed by: NURSE PRACTITIONER

## 2024-12-09 PROCEDURE — 94760 N-INVAS EAR/PLS OXIMETRY 1: CPT

## 2024-12-09 RX ORDER — TAMSULOSIN HYDROCHLORIDE 0.4 MG/1
0.8 CAPSULE ORAL DAILY
Status: DISCONTINUED | OUTPATIENT
Start: 2024-12-09 | End: 2024-12-12 | Stop reason: HOSPADM

## 2024-12-09 RX ADMIN — ENOXAPARIN SODIUM 40 MG: 100 INJECTION SUBCUTANEOUS at 08:23

## 2024-12-09 RX ADMIN — SODIUM CHLORIDE, PRESERVATIVE FREE 10 ML: 5 INJECTION INTRAVENOUS at 23:22

## 2024-12-09 RX ADMIN — SODIUM CHLORIDE, PRESERVATIVE FREE 10 ML: 5 INJECTION INTRAVENOUS at 08:25

## 2024-12-09 RX ADMIN — ONDANSETRON 4 MG: 2 INJECTION INTRAMUSCULAR; INTRAVENOUS at 23:22

## 2024-12-09 RX ADMIN — TAMSULOSIN HYDROCHLORIDE 0.8 MG: 0.4 CAPSULE ORAL at 08:24

## 2024-12-09 RX ADMIN — THIAMINE HYDROCHLORIDE 250 MG: 100 INJECTION, SOLUTION INTRAMUSCULAR; INTRAVENOUS at 11:10

## 2024-12-09 RX ADMIN — FOLIC ACID 1 MG: 1 TABLET ORAL at 08:24

## 2024-12-09 RX ADMIN — Medication 2000 UNITS: at 08:24

## 2024-12-09 NOTE — PROGRESS NOTES
V2.0  [facility] [facility]      Name:  Chavez Hyatt /Age/Sex: 1960  (64 y.o. male)   MRN & CSN:  [id number] & [id number] Encounter Date/Time: 2024 1:05 PM EST    Location:  [id number] PCP: Gladys, Pcp       Hospital Day: 8  Subjective:   Chief Complaint: Follow-up encephalopathy    Patient seen and evaluated at bedside, seems to be that patient is getting better, slowly, each day, he is oriented x 1 today, appropriately responsive though    Review of Systems:    Review of Systems    10 point ROS negative except as stated above in \"subjective\" section    Objective:     Intake/Output Summary (Last 24 hours) at 2024 1305  Last data filed at 2024 0715  Gross per 24 hour   Intake 240 ml   Output 550 ml   Net -310 ml      Vitals:   Vitals:    24 0715   BP: (!) 133/95   Pulse: 85   Resp: 18   Temp: 97.9 °F (36.6 °C)   SpO2: 97%     Physical Exam:   General: Awake, alert and oriented x 1, NAD  Cardiovascular: S1S2 present, regular rate and rhythm, no murmurs  Respiratory: Clear to auscultation  Gastrointestinal: Soft, non tender, positive bowel sounds   Genitourinary: no suprapubic tenderness  Musculoskeletal: No edema      Medications:     Medications:    tamsulosin  0.8 mg Oral Daily    haloperidol lactate  5 mg IntraMUSCular Once    Vitamin D  2,000 Units Oral Daily    thiamine (B-1) 250 mg in sodium chloride 0.9 % 100 mL IVPB  250 mg IntraVENous Daily    folic acid  1 mg Oral Daily    nicotine  1 patch TransDERmal Daily    sodium chloride flush  5-40 mL IntraVENous 2 times per day    enoxaparin  40 mg SubCUTAneous Daily      Infusions:    sodium chloride       PRN Meds: melatonin, 6 mg, Nightly PRN  sodium chloride flush, 5-40 mL, PRN  sodium chloride, , PRN  potassium chloride, 40 mEq, PRN   Or  potassium alternative oral replacement, 40 mEq, PRN   Or  potassium chloride, 10 mEq, PRN  magnesium sulfate, 2,000 mg, PRN  ondansetron, 4 mg, Q8H PRN   Or  ondansetron, 4 mg, Q6H  PRN  polyethylene glycol, 17 g, Daily PRN  acetaminophen, 650 mg, Q6H PRN   Or  acetaminophen, 650 mg, Q6H PRN        Labs      No results found for this or any previous visit (from the past 24 hour(s)).     Imaging/Diagnostics Last 24 Hours   XR KNEE RIGHT (1-2 VIEWS)    No acute osseous abnormality of either knee.     XR KNEE LEFT (1-2 VIEWS)    No acute osseous abnormality of either knee.     MRI BRAIN W WO CONTRAST    Chronic microvascular disease without acute intracranial abnormality.  No abnormal postcontrast enhancement.     CT HEAD WO CONTRAST    No acute intracranial abnormality.     CT HEAD WO CONTRAST    No acute intracranial abnormality.     CT HEAD WO CONTRAST    No acute intracranial abnormality. No acute fracture or traumatic subluxation of the cervical spine.     CT CERVICAL SPINE WO CONTRAST    No acute intracranial abnormality. No acute fracture or traumatic subluxation of the cervical spine.     XR CHEST PORTABLE    No acute process.       Assessment and Plan:   Chavez Hyatt is a 64 y.o. male     Conditions under treatment:    # Acute versus subacute, metabolic encephalopathy  # Probable cystitis  # Fall, mechanical  # Generalized weakness  # History of heavy alcohol use  # Vitamin D deficiency  # Folate deficiency  # Abnormal thyroid profile  # Electrolyte derangements  # Urinary retention     Plan:     -Patient with multiple falls at home, workup negative for any fractures or dislocations or injuries, will certainly need rehab, slowly improving, seen by PT OT, today will be for stay without sitter, will have to see how he does before he can go to rehab    -Patient with progressive worsening of cognition, seen by neurology, workup for acute process negative besides some vitamin deficiencies which may or may not explain the patient's presentation, we suspect more of a subacute process related to his longstanding alcohol abuse.  EEG is negative for seizures, MRI is negative, CT head is negative.

## 2024-12-09 NOTE — PLAN OF CARE
Problem: Discharge Planning  Goal: Discharge to home or other facility with appropriate resources  12/8/2024 1211 by Jillian Rosales RN  Outcome: Progressing     Problem: Safety - Adult  Goal: Free from fall injury  12/8/2024 2206 by Krista Hamm RN  Outcome: Progressing  12/8/2024 1211 by Jillian Rosales RN  Outcome: Progressing     Problem: Skin/Tissue Integrity  Goal: Absence of new skin breakdown  Description: 1.  Monitor for areas of redness and/or skin breakdown  2.  Assess vascular access sites hourly  3.  Every 4-6 hours minimum:  Change oxygen saturation probe site  4.  Every 4-6 hours:  If on nasal continuous positive airway pressure, respiratory therapy assess nares and determine need for appliance change or resting period.  12/8/2024 1211 by Jillian Rosales RN  Outcome: Progressing     Problem: Nutrition Deficit:  Goal: Optimize nutritional status  12/8/2024 1211 by Jillian Rosales RN  Outcome: Progressing     Problem: Neurosensory - Adult  Goal: Achieves stable or improved neurological status  12/8/2024 1211 by Jillian Rosales RN  Outcome: Progressing     Problem: Skin/Tissue Integrity - Adult  Goal: Skin integrity remains intact  12/8/2024 1211 by Jillian Rosales RN  Outcome: Progressing  Goal: Incisions, wounds, or drain sites healing without S/S of infection  12/8/2024 1211 by Jillian Rosales RN  Outcome: Progressing     Problem: Musculoskeletal - Adult  Goal: Return mobility to safest level of function  12/8/2024 2206 by Krista Hamm RN  Outcome: Progressing  12/8/2024 1211 by Jillian Rosales RN  Outcome: Progressing     Problem: Gastrointestinal - Adult  Goal: Minimal or absence of nausea and vomiting  12/8/2024 2206 by Krista Hamm RN  Outcome: Progressing  12/8/2024 1211 by Jillian Rosales RN  Outcome: Progressing  Goal: Maintains adequate nutritional intake  12/8/2024 1211 by Jillian Rosales RN  Outcome: Progressing     Problem: Genitourinary - Adult  Goal: Absence of urinary

## 2024-12-09 NOTE — PROGRESS NOTES
Discussed risks, benefits, and alternatives with patient.    Patient reason for declining turning treatment    The following provider was notified of patient's intent to refuse: Dr. Wilson    Feedback from provider: No new orders    Alternative interventions used in the meantime: N/A    Electronically signed by Jillian Rosales RN on 12/9/2024 at 3:50 PM

## 2024-12-09 NOTE — PROGRESS NOTES
Bedside report completed. Patients family members at the bedside. Pt is in bed lowest position locked and alarm on.  Electronically signed by KE BASHIR RN on 12/8/2024 at 7:11 PM    Educated pt about need for repositioning and ensuring skin integrity. Pt is agreeable but eventually shifts back to laying supine. Pt continues to have redness and denuded skin on buttocks and intergluteal cleft. Positioning purple wedge pillow added to help pt lay on his side. Re-educated pt. 3x rails up, bed alarm turned on.  Electronically signed by KE BASHIR RN on 12/8/2024 at 10:10 PM    Pt in bed awake and occasionally calling out & when this RN enters the room pt states he pressed the call light on accident. Pt states he sees someone or something in the room. Pt is the only person in the room. Pt asked if this RN could pass over the cigarettes. Explained to pt he has on a nicotine patch and that he is in the hospital and can not smoke in here. Pt able to be redirected. Pt currently in bed listening to his radio. Bed in lowest position, locked and alarm on.  Electronically signed by KE BASHIR RN on 12/9/2024 at 1:52 AM    Straight cath at 0630: 550mL  Electronically signed by KE BASHIR RN on 12/9/2024 at 6:47 AM

## 2024-12-09 NOTE — PLAN OF CARE
Problem: Discharge Planning  Goal: Discharge to home or other facility with appropriate resources  Outcome: Progressing     Problem: Safety - Adult  Goal: Free from fall injury  12/9/2024 1035 by Jillian Rosales RN  Outcome: Progressing  12/8/2024 2206 by Krista Hamm RN  Outcome: Progressing     Problem: Skin/Tissue Integrity  Goal: Absence of new skin breakdown  Description: 1.  Monitor for areas of redness and/or skin breakdown  2.  Assess vascular access sites hourly  3.  Every 4-6 hours minimum:  Change oxygen saturation probe site  4.  Every 4-6 hours:  If on nasal continuous positive airway pressure, respiratory therapy assess nares and determine need for appliance change or resting period.  Outcome: Progressing     Problem: Nutrition Deficit:  Goal: Optimize nutritional status  Outcome: Progressing     Problem: Neurosensory - Adult  Goal: Achieves stable or improved neurological status  Outcome: Progressing     Problem: Skin/Tissue Integrity - Adult  Goal: Skin integrity remains intact  Outcome: Progressing  Goal: Incisions, wounds, or drain sites healing without S/S of infection  Outcome: Progressing     Problem: Musculoskeletal - Adult  Goal: Return mobility to safest level of function  12/9/2024 1035 by Jillian Rosales RN  Outcome: Progressing  12/8/2024 2206 by Krista Hamm RN  Outcome: Progressing     Problem: Gastrointestinal - Adult  Goal: Minimal or absence of nausea and vomiting  12/9/2024 1035 by Jillian Rosales RN  Outcome: Progressing  12/8/2024 2206 by Krista Hamm RN  Outcome: Progressing  Goal: Maintains adequate nutritional intake  Outcome: Progressing     Problem: Genitourinary - Adult  Goal: Absence of urinary retention  12/9/2024 1035 by Jillian Rosales RN  Outcome: Progressing  12/8/2024 2206 by Krista Hamm RN  Outcome: Progressing

## 2024-12-09 NOTE — PROGRESS NOTES
Physical Therapy  Facility/Department: [address] MED SURG  Physical Therapy Treatment Note    Name: Chavez Hyatt  : 1960  MRN: [id number]  Date of Service: 2024    Discharge Recommendations:  Therapy recommended at discharge, Continue to assess pending progress    Chavez Hyatt scored a  on the AM-PAC short mobility form. Current research shows that an AM-PAC score of 17 or less is typically not associated with a discharge to the patient's home setting. Based on the patient's AM-PAC score and their current functional mobility deficits, it is recommended that the patient have 3-5 sessions per week of Physical Therapy at d/c to increase the patient's independence.  Please see assessment section for further patient specific details.    If patient discharges prior to next session this note will serve as a discharge summary.  Please see below for the latest assessment towards goals.        Patient Diagnosis(es): The primary encounter diagnosis was Altered mental status, unspecified altered mental status type. Diagnoses of Acute cystitis without hematuria, Hypokalemia, Dehydration, Elevated LFTs, and History of alcohol use were also pertinent to this visit.  Past Medical History:  has no past medical history on file.  Past Surgical History:  has a past surgical history that includes Facial Surgery (Right, 10/14/2022).    Assessment  Body Structures, Functions, Activity Limitations Requiring Skilled Therapeutic Intervention: Decreased functional mobility ;Decreased ADL status;Decreased strength;Decreased endurance;Decreased cognition;Decreased safe awareness;Decreased balance  Assessment: Chavez Hyatt is a 64 y.o. male with no pmh who presents to the ED on 24 with AMS. Prior to admission, pt livign alone or with sister, independent with ADLs and ambulation without device (questionable historian). Pt continues to function below baseline, however he is able to ambulate with RW and min A this date

## 2024-12-09 NOTE — PROGRESS NOTES
Strengthening, Balance training, Functional mobility training, Endurance training, Gait training, Patient/Caregiver education & training, Cognitive reorientation, Equipment evaluation, education, & procurement, Safety education & training, Self-Care / ADL    Restrictions  Restrictions/Precautions  Restrictions/Precautions: Fall Risk  Position Activity Restriction  Other Position/Activity Restrictions: Pt noted to have fallen twice on 12/5.    Subjective  General  Chart Reviewed: Yes  Patient assessed for rehabilitation services?: Yes  Additional Pertinent Hx: pt is a 63 yo male admitted with AMS.  Response to previous treatment: Patient with no complaints from previous session  Family / Caregiver Present: No  Referring Practitioner: Kacy Daniel MD  Diagnosis: AMS  Subjective  Subjective: Seen in room, agreed to OT/PT, no complaints except dizziness when upright  General Comment  Comments: RN ok to see for therapy     Social/Functional History  Social/Functional History  Lives With: Alone  Type of Home: Apartment  Home Layout: One level  Home Access: Level entry  Bathroom Shower/Tub: Tub/Shower unit  Bathroom Toilet: Standard  Prior Level of Assist for ADLs: Independent  Prior Level of Assist for Homemaking: Independent  Prior Level of Assist for Transfers: Independent  Active : Yes  Occupation: Full time employment  Type of Occupation: [employer]  Leisure & Hobbies: golf and hang out with friends  Additional Comments: Questionable historian - ED notes report patient lives with sister.    Objective               Safety Devices  Type of Devices: All fall risk precautions in place;Call light within reach;Patient at risk for falls;Nurse notified;Gait belt;Chair alarm in place        AROM: Within functional limits  Strength: Generally decreased, functional  ADL  Feeding: Setup  Functional Mobility Skilled Clinical Factors: Amb with RW with min of 2, no knee buckling this session.  Pt required        Time Out 1420         Minutes 25         Timed Code Treatment Minutes: 25 Minutes       Comfort Metcalf, OT

## 2024-12-10 LAB
ALBUMIN SERPL-MCNC: 3.1 G/DL (ref 3.4–5)
ALBUMIN/GLOB SERPL: 1.2 {RATIO} (ref 1.1–2.2)
ALP SERPL-CCNC: 109 U/L (ref 40–129)
ALT SERPL-CCNC: 26 U/L (ref 10–40)
ANION GAP SERPL CALCULATED.3IONS-SCNC: 8 MMOL/L (ref 3–16)
AST SERPL-CCNC: 47 U/L (ref 15–37)
BASOPHILS # BLD: 0 K/UL (ref 0–0.2)
BASOPHILS NFR BLD: 0.7 %
BILIRUB SERPL-MCNC: 0.8 MG/DL (ref 0–1)
BUN SERPL-MCNC: 5 MG/DL (ref 7–20)
CALCIUM SERPL-MCNC: 8.2 MG/DL (ref 8.3–10.6)
CHLORIDE SERPL-SCNC: 101 MMOL/L (ref 99–110)
CO2 SERPL-SCNC: 24 MMOL/L (ref 21–32)
CREAT SERPL-MCNC: 0.5 MG/DL (ref 0.8–1.3)
DEPRECATED RDW RBC AUTO: 15.5 % (ref 12.4–15.4)
EOSINOPHIL # BLD: 0.1 K/UL (ref 0–0.6)
EOSINOPHIL NFR BLD: 1.3 %
GFR SERPLBLD CREATININE-BSD FMLA CKD-EPI: >90 ML/MIN/{1.73_M2}
GLUCOSE SERPL-MCNC: 91 MG/DL (ref 70–99)
HCT VFR BLD AUTO: 37.7 % (ref 40.5–52.5)
HGB BLD-MCNC: 12.9 G/DL (ref 13.5–17.5)
LYMPHOCYTES # BLD: 1.2 K/UL (ref 1–5.1)
LYMPHOCYTES NFR BLD: 18.4 %
MAGNESIUM SERPL-MCNC: 2.03 MG/DL (ref 1.8–2.4)
MCH RBC QN AUTO: 33.3 PG (ref 26–34)
MCHC RBC AUTO-ENTMCNC: 34.3 G/DL (ref 31–36)
MCV RBC AUTO: 97 FL (ref 80–100)
MONOCYTES # BLD: 0.8 K/UL (ref 0–1.3)
MONOCYTES NFR BLD: 12.1 %
NEUTROPHILS # BLD: 4.3 K/UL (ref 1.7–7.7)
NEUTROPHILS NFR BLD: 67.5 %
PLATELET # BLD AUTO: 163 K/UL (ref 135–450)
PMV BLD AUTO: 7.5 FL (ref 5–10.5)
POTASSIUM SERPL-SCNC: 3.4 MMOL/L (ref 3.5–5.1)
PROT SERPL-MCNC: 5.6 G/DL (ref 6.4–8.2)
RBC # BLD AUTO: 3.88 M/UL (ref 4.2–5.9)
SODIUM SERPL-SCNC: 133 MMOL/L (ref 136–145)
VIT B1 SERPL-MCNC: 13 NMOL/L (ref 4–15)
WBC # BLD AUTO: 6.4 K/UL (ref 4–11)

## 2024-12-10 PROCEDURE — 51701 INSERT BLADDER CATHETER: CPT

## 2024-12-10 PROCEDURE — 6370000000 HC RX 637 (ALT 250 FOR IP): Performed by: STUDENT IN AN ORGANIZED HEALTH CARE EDUCATION/TRAINING PROGRAM

## 2024-12-10 PROCEDURE — 83735 ASSAY OF MAGNESIUM: CPT

## 2024-12-10 PROCEDURE — 97530 THERAPEUTIC ACTIVITIES: CPT

## 2024-12-10 PROCEDURE — 6370000000 HC RX 637 (ALT 250 FOR IP): Performed by: NURSE PRACTITIONER

## 2024-12-10 PROCEDURE — 94760 N-INVAS EAR/PLS OXIMETRY 1: CPT

## 2024-12-10 PROCEDURE — 85025 COMPLETE CBC W/AUTO DIFF WBC: CPT

## 2024-12-10 PROCEDURE — 80053 COMPREHEN METABOLIC PANEL: CPT

## 2024-12-10 PROCEDURE — 6370000000 HC RX 637 (ALT 250 FOR IP): Performed by: HOSPITALIST

## 2024-12-10 PROCEDURE — 36415 COLL VENOUS BLD VENIPUNCTURE: CPT

## 2024-12-10 PROCEDURE — 6370000000 HC RX 637 (ALT 250 FOR IP)

## 2024-12-10 PROCEDURE — 2580000003 HC RX 258

## 2024-12-10 PROCEDURE — 1200000000 HC SEMI PRIVATE

## 2024-12-10 PROCEDURE — 51798 US URINE CAPACITY MEASURE: CPT

## 2024-12-10 PROCEDURE — 2580000003 HC RX 258: Performed by: NURSE PRACTITIONER

## 2024-12-10 PROCEDURE — 6360000002 HC RX W HCPCS

## 2024-12-10 PROCEDURE — 6360000002 HC RX W HCPCS: Performed by: NURSE PRACTITIONER

## 2024-12-10 RX ORDER — TAMSULOSIN HYDROCHLORIDE 0.4 MG/1
0.8 CAPSULE ORAL DAILY
Qty: 30 CAPSULE | Refills: 0
Start: 2024-12-11

## 2024-12-10 RX ORDER — CHOLECALCIFEROL (VITAMIN D3) 50 MCG
2000 TABLET ORAL DAILY
Qty: 60 TABLET | Refills: 0
Start: 2024-12-11

## 2024-12-10 RX ORDER — QUETIAPINE FUMARATE 25 MG/1
25 TABLET, FILM COATED ORAL NIGHTLY
Status: DISCONTINUED | OUTPATIENT
Start: 2024-12-10 | End: 2024-12-12 | Stop reason: HOSPADM

## 2024-12-10 RX ORDER — FOLIC ACID 1 MG/1
1 TABLET ORAL DAILY
Qty: 30 TABLET | Refills: 0
Start: 2024-12-11

## 2024-12-10 RX ADMIN — ENOXAPARIN SODIUM 40 MG: 100 INJECTION SUBCUTANEOUS at 10:13

## 2024-12-10 RX ADMIN — Medication 6 MG: at 20:45

## 2024-12-10 RX ADMIN — SODIUM CHLORIDE, PRESERVATIVE FREE 10 ML: 5 INJECTION INTRAVENOUS at 10:14

## 2024-12-10 RX ADMIN — FOLIC ACID 1 MG: 1 TABLET ORAL at 10:13

## 2024-12-10 RX ADMIN — Medication 2000 UNITS: at 10:13

## 2024-12-10 RX ADMIN — SODIUM CHLORIDE, PRESERVATIVE FREE 10 ML: 5 INJECTION INTRAVENOUS at 20:45

## 2024-12-10 RX ADMIN — TAMSULOSIN HYDROCHLORIDE 0.8 MG: 0.4 CAPSULE ORAL at 10:13

## 2024-12-10 RX ADMIN — QUETIAPINE FUMARATE 25 MG: 25 TABLET ORAL at 20:45

## 2024-12-10 RX ADMIN — THIAMINE HYDROCHLORIDE 250 MG: 100 INJECTION, SOLUTION INTRAMUSCULAR; INTRAVENOUS at 10:17

## 2024-12-10 RX ADMIN — POTASSIUM CHLORIDE 40 MEQ: 20 TABLET, EXTENDED RELEASE ORAL at 11:46

## 2024-12-10 NOTE — PLAN OF CARE
Problem: Discharge Planning  Goal: Discharge to home or other facility with appropriate resources  12/10/2024 1141 by Jillian Rosales RN  Outcome: Progressing  12/10/2024 0011 by Christine Lopez RN  Outcome: Progressing  Flowsheets (Taken 12/9/2024 1956)  Discharge to home or other facility with appropriate resources: Identify barriers to discharge with patient and caregiver     Problem: Safety - Adult  Goal: Free from fall injury  12/10/2024 1141 by Jillian Rosales RN  Outcome: Progressing  12/10/2024 0011 by Christine Lopez RN  Outcome: Progressing     Problem: Skin/Tissue Integrity  Goal: Absence of new skin breakdown  Description: 1.  Monitor for areas of redness and/or skin breakdown  2.  Assess vascular access sites hourly  3.  Every 4-6 hours minimum:  Change oxygen saturation probe site  4.  Every 4-6 hours:  If on nasal continuous positive airway pressure, respiratory therapy assess nares and determine need for appliance change or resting period.  12/10/2024 1141 by Jillian Rosales RN  Outcome: Progressing  12/10/2024 0011 by Christine Lopez RN  Outcome: Progressing     Problem: Nutrition Deficit:  Goal: Optimize nutritional status  12/10/2024 1141 by Jillian Rosales RN  Outcome: Progressing  12/10/2024 0011 by Christine Lopez RN  Outcome: Progressing     Problem: Neurosensory - Adult  Goal: Achieves stable or improved neurological status  12/10/2024 1141 by Jillian Rosales RN  Outcome: Progressing  12/10/2024 0011 by Christine Lopez RN  Outcome: Progressing     Problem: Skin/Tissue Integrity - Adult  Goal: Skin integrity remains intact  12/10/2024 1141 by Jillian Rosales RN  Outcome: Progressing  12/10/2024 0011 by Christine Lopez, RN  Outcome: Progressing  Flowsheets (Taken 12/9/2024 1956)  Skin Integrity Remains Intact: Monitor for areas of redness and/or skin breakdown  Goal: Incisions, wounds, or drain sites healing without S/S of infection  12/10/2024 1141 by Jillian Rosales  RN  Outcome: Progressing  12/10/2024 0011 by Christine Lopez RN  Outcome: Progressing  Flowsheets (Taken 12/9/2024 1956)  Incisions, Wounds, or Drain Sites Healing Without Sign and Symptoms of Infection: TWICE DAILY: Assess and document skin integrity     Problem: Musculoskeletal - Adult  Goal: Return mobility to safest level of function  12/10/2024 1141 by Jillian Rosales RN  Outcome: Progressing  12/10/2024 0011 by Christine Lopez RN  Outcome: Progressing     Problem: Gastrointestinal - Adult  Goal: Minimal or absence of nausea and vomiting  12/10/2024 1141 by Jillian Rosales RN  Outcome: Progressing  12/10/2024 0011 by Christine Lopez RN  Outcome: Progressing  Goal: Maintains adequate nutritional intake  12/10/2024 1141 by Jillian Rosales RN  Outcome: Progressing  12/10/2024 0011 by Christine Lopez RN  Outcome: Progressing     Problem: Genitourinary - Adult  Goal: Absence of urinary retention  12/10/2024 1141 by Jillian Rosales RN  Outcome: Progressing  12/10/2024 0011 by Christine Lopez RN  Outcome: Progressing  Flowsheets (Taken 12/9/2024 1956)  Absence of urinary retention: Assess patient’s ability to void and empty bladder     Problem: Cardiovascular - Adult  Goal: Maintains optimal cardiac output and hemodynamic stability  12/10/2024 1141 by Jillian Rosales RN  Outcome: Progressing  12/10/2024 0011 by Christine Lopez RN  Outcome: Progressing  Goal: Absence of cardiac dysrhythmias or at baseline  12/10/2024 1141 by Jillian Rosales RN  Outcome: Progressing  12/10/2024 0011 by Christine Lopez RN  Outcome: Progressing

## 2024-12-10 NOTE — CARE COORDINATION
Call placed to [facility] admissions (Luis Carlos) who states that facility can accept when sitter free for 48 hrs. Bedside RN and MD informed. Patient will need HENS and medical transport.   Electronically signed by NORY Interiano on 12/10/2024 at 11:21 AM  [phone]

## 2024-12-10 NOTE — PROGRESS NOTES
Occupational Therapy  Facility/Department: [address] MED SURG  Occupational Therapy Daily Treatment Note    Name: Chavez Haytt  : 1960  MRN: [id number]  Date of Service: 12/10/2024    Discharge Recommendations:  3-5 sessions per week, Patient would benefit from continued therapy after discharge     Chavez Hyatt scored a 15/24 on the AM-PAC ADL Inpatient form. Current research shows that an AM-PAC score of 17 or less is typically not associated with a discharge to the patient's home setting. Based on the patient's AM-PAC score and their current ADL deficits, it is recommended that the patient have 3-5 sessions per week of Occupational Therapy at d/c to increase the patient's independence.  Please see assessment section for further patient specific details.    If patient discharges prior to next session this note will serve as a discharge summary.  Please see below for the latest assessment towards goals.       Patient Diagnosis(es): The primary encounter diagnosis was Altered mental status, unspecified altered mental status type. Diagnoses of Acute cystitis without hematuria, Hypokalemia, Dehydration, Elevated LFTs, and History of alcohol use were also pertinent to this visit.  Past Medical History:  has no past medical history on file.  Past Surgical History:  has a past surgical history that includes Facial Surgery (Right, 10/14/2022).    Treatment Diagnosis: decreased fxl mobility/ADLs/cognition/balance    Assessment  Performance deficits / Impairments: Decreased functional mobility ;Decreased safe awareness;Decreased balance;Decreased ADL status;Decreased cognition;Decreased high-level IADLs;Decreased endurance;Decreased strength  Assessment: Pt seen in room, cotx with PT. Pt agreed to OT/PT and to get OOB with encouragement.  Reports no pain.  Moved to EOB supine>sit with Agustín.  Pt stood with min A to RW and amb EOB to recliner with min/modA, no LOB, L knee buckling 1x but no overt LOB noted. cues  throughout for walker safety and management provided.  pt incontinent of small BM requiring totalA toileting to complete pericare and brief change.  Pt returned to recliner and left sitting with all needs met.  Cont OT POC.  Recommend 3-5x week.  Prognosis: Fair  REQUIRES OT FOLLOW-UP: Yes  Activity Tolerance  Activity Tolerance: Treatment limited secondary to decreased cognition;Patient Tolerated treatment well     Plan  Occupational Therapy Plan  Times Per Week: 3-5x  Current Treatment Recommendations: Strengthening, Balance training, Functional mobility training, Endurance training, Gait training, Patient/Caregiver education & training, Cognitive reorientation, Equipment evaluation, education, & procurement, Safety education & training, Self-Care / ADL    Restrictions  Restrictions/Precautions  Restrictions/Precautions: Fall Risk  Position Activity Restriction  Other Position/Activity Restrictions: Pt noted to have fallen twice on 12/5.    Subjective  General  Chart Reviewed: Yes  Patient assessed for rehabilitation services?: Yes  Additional Pertinent Hx: pt is a 65 yo male admitted with AMS.  Response to previous treatment: Patient with no complaints from previous session  Family / Caregiver Present: No  Referring Practitioner: Kacy Daniel MD  Diagnosis: AMS  Subjective  Subjective: Seen in room, agreed to OT/PT cotx, no complaints.  General Comment  Comments: RN ok to see for therapy     Social/Functional History  Social/Functional History  Lives With: Alone  Type of Home: Apartment  Home Layout: One level  Home Access: Level entry  Bathroom Shower/Tub: Tub/Shower unit  Bathroom Toilet: Standard  Prior Level of Assist for ADLs: Independent  Prior Level of Assist for Homemaking: Independent  Prior Level of Assist for Transfers: Independent  Active : Yes  Occupation: Full time employment  Type of Occupation: [employer]  Leisure & Hobbies: golf and hang out with friends  Additional

## 2024-12-10 NOTE — PLAN OF CARE
Problem: Discharge Planning  Goal: Discharge to home or other facility with appropriate resources  12/10/2024 0011 by Christine Lopez RN  Outcome: Progressing  Flowsheets (Taken 12/9/2024 1956)  Discharge to home or other facility with appropriate resources: Identify barriers to discharge with patient and caregiver  12/9/2024 1035 by Jillian Rosales RN  Outcome: Progressing     Problem: Safety - Adult  Goal: Free from fall injury  12/10/2024 0011 by Christine Lopez RN  Outcome: Progressing  12/9/2024 1035 by Jillian Rosales RN  Outcome: Progressing     Problem: Skin/Tissue Integrity  Goal: Absence of new skin breakdown  Description: 1.  Monitor for areas of redness and/or skin breakdown  2.  Assess vascular access sites hourly  3.  Every 4-6 hours minimum:  Change oxygen saturation probe site  4.  Every 4-6 hours:  If on nasal continuous positive airway pressure, respiratory therapy assess nares and determine need for appliance change or resting period.  12/10/2024 0011 by Christine Lopez RN  Outcome: Progressing  12/9/2024 1035 by Jillian Rosales RN  Outcome: Progressing     Problem: Nutrition Deficit:  Goal: Optimize nutritional status  12/10/2024 0011 by Christine Lopez RN  Outcome: Progressing  12/9/2024 1035 by Jillian Rosales RN  Outcome: Progressing     Problem: Neurosensory - Adult  Goal: Achieves stable or improved neurological status  12/10/2024 0011 by Christine Lopez, RN  Outcome: Progressing  12/9/2024 1035 by Jillian Rosales RN  Outcome: Progressing     Problem: Skin/Tissue Integrity - Adult  Goal: Skin integrity remains intact  12/10/2024 0011 by Christine Lopez RN  Outcome: Progressing  Flowsheets (Taken 12/9/2024 1956)  Skin Integrity Remains Intact: Monitor for areas of redness and/or skin breakdown  12/9/2024 1035 by Jillian Rosales RN  Outcome: Progressing  Goal: Incisions, wounds, or drain sites healing without S/S of infection  12/10/2024 0011 by Christine Lopez

## 2024-12-10 NOTE — PROGRESS NOTES
Pt has remained confused all shift. Pt needs constant reorientation to place, time  and situation. Pt has only slept in short increments of 5-10 minutes.

## 2024-12-10 NOTE — PROGRESS NOTES
V2.0  [facility] [place]      Name:  Chavez Hyatt /Age/Sex: 1960  (64 y.o. male)   MRN & CSN:  [id number] & [id number] Encounter Date/Time: 12/10/2024 12:17 PM EST    Location:  [id number] PCP: Gladys, Pcp       Hospital Day: 9  Subjective:   Chief Complaint: Follow-up encephalopathy    Patient seen and evaluated at bedside, denies any new complaints, still with sitter at the bedside    Review of Systems:    Review of Systems    10 point ROS negative except as stated above in \"subjective\" section    Objective:     Intake/Output Summary (Last 24 hours) at 12/10/2024 1217  Last data filed at 12/10/2024 0123  Gross per 24 hour   Intake 240 ml   Output --   Net 240 ml      Vitals:   Vitals:    12/10/24 0901   BP:    Pulse:    Resp:    Temp:    SpO2: 96%     Physical Exam:   General: Awake, alert and oriented x 1 only  Cardiovascular: S1S2 present, regular rate and rhythm, no murmurs  Respiratory: Clear to auscultation  Gastrointestinal: Soft, non tender, positive bowel sounds   Genitourinary: no suprapubic tenderness  Musculoskeletal: No edema  Neuro: Alert.    Medications:     Medications:    tamsulosin  0.8 mg Oral Daily    haloperidol lactate  5 mg IntraMUSCular Once    Vitamin D  2,000 Units Oral Daily    thiamine (B-1) 250 mg in sodium chloride 0.9 % 100 mL IVPB  250 mg IntraVENous Daily    folic acid  1 mg Oral Daily    nicotine  1 patch TransDERmal Daily    sodium chloride flush  5-40 mL IntraVENous 2 times per day    enoxaparin  40 mg SubCUTAneous Daily      Infusions:    sodium chloride       PRN Meds: melatonin, 6 mg, Nightly PRN  sodium chloride flush, 5-40 mL, PRN  sodium chloride, , PRN  potassium chloride, 40 mEq, PRN   Or  potassium alternative oral replacement, 40 mEq, PRN   Or  potassium chloride, 10 mEq, PRN  magnesium sulfate, 2,000 mg, PRN  ondansetron, 4 mg, Q8H PRN   Or  ondansetron, 4 mg, Q6H PRN  polyethylene glycol, 17 g, Daily PRN  acetaminophen, 650 mg, Q6H PRN    Or  acetaminophen, 650 mg, Q6H PRN        Labs      Recent Results (from the past 24 hour(s))   CBC with Auto Differential    Collection Time: 12/10/24  6:24 AM   Result Value Ref Range    WBC 6.4 4.0 - 11.0 K/uL    RBC 3.88 (L) 4.20 - 5.90 M/uL    Hemoglobin 12.9 (L) 13.5 - 17.5 g/dL    Hematocrit 37.7 (L) 40.5 - 52.5 %    MCV 97.0 80.0 - 100.0 fL    MCH 33.3 26.0 - 34.0 pg    MCHC 34.3 31.0 - 36.0 g/dL    RDW 15.5 (H) 12.4 - 15.4 %    Platelets 163 135 - 450 K/uL    MPV 7.5 5.0 - 10.5 fL    Neutrophils % 67.5 %    Lymphocytes % 18.4 %    Monocytes % 12.1 %    Eosinophils % 1.3 %    Basophils % 0.7 %    Neutrophils Absolute 4.3 1.7 - 7.7 K/uL    Lymphocytes Absolute 1.2 1.0 - 5.1 K/uL    Monocytes Absolute 0.8 0.0 - 1.3 K/uL    Eosinophils Absolute 0.1 0.0 - 0.6 K/uL    Basophils Absolute 0.0 0.0 - 0.2 K/uL   Comprehensive Metabolic Panel w/ Reflex to MG    Collection Time: 12/10/24  6:24 AM   Result Value Ref Range    Sodium 133 (L) 136 - 145 mmol/L    Potassium reflex Magnesium 3.4 (L) 3.5 - 5.1 mmol/L    Chloride 101 99 - 110 mmol/L    CO2 24 21 - 32 mmol/L    Anion Gap 8 3 - 16    Glucose 91 70 - 99 mg/dL    BUN 5 (L) 7 - 20 mg/dL    Creatinine 0.5 (L) 0.8 - 1.3 mg/dL    Est, Glom Filt Rate >90 >60    Calcium 8.2 (L) 8.3 - 10.6 mg/dL    Total Protein 5.6 (L) 6.4 - 8.2 g/dL    Albumin 3.1 (L) 3.4 - 5.0 g/dL    Albumin/Globulin Ratio 1.2 1.1 - 2.2    Total Bilirubin 0.8 0.0 - 1.0 mg/dL    Alkaline Phosphatase 109 40 - 129 U/L    ALT 26 10 - 40 U/L    AST 47 (H) 15 - 37 U/L   Magnesium    Collection Time: 12/10/24  6:24 AM   Result Value Ref Range    Magnesium 2.03 1.80 - 2.40 mg/dL        Imaging/Diagnostics Last 24 Hours   XR KNEE RIGHT (1-2 VIEWS)    Result Date: 12/5/2024  EXAMINATION: 2 XRAY VIEWS OF THE LEFT KNEE; 2 XRAY VIEWS OF THE RIGHT KNEE 12/5/2024 5:15 pm COMPARISON: None. HISTORY: Fall. FINDINGS: No acute fracture seen.  No malalignment.  Advanced osteoarthrosis of both knees, left greater than

## 2024-12-10 NOTE — PROGRESS NOTES
Occupational Therapy  Chavez Hyatt  1960  [id number]    OT to pt's room to see for tx session this date, however pt in bed and very confused declining any OOB activity at this time. Pt incontinent of small BM with therapist offering to walk pt to bathroom but pt declining. Pt requiring totalA for toileting rolling side to side in bed to change brief and complete pericare. Sitter at bedside, call light within reach. No other needs at this time. Will attempt back as therapist schedule allows.     Timed Treatment Minutes: 10 minutes (280-830)  Electronically signed by NAHED Carpenter/L on 12/10/2024 at 8:34 AM

## 2024-12-10 NOTE — PROGRESS NOTES
Physical Therapy  Facility/Department: [address] MED SURG  Physical Therapy Treatment Note    Name: Chavez Hyatt  : 1960  MRN: [id number]  Date of Service: 12/10/2024    Discharge Recommendations:  Therapy recommended at discharge, Continue to assess pending progress    Chavez Hyatt scored a  on the AM-PAC short mobility form. Current research shows that an AM-PAC score of 17 or less is typically not associated with a discharge to the patient's home setting. Based on the patient's AM-PAC score and their current functional mobility deficits, it is recommended that the patient have 3-5 sessions per week of Physical Therapy at d/c to increase the patient's independence.  Please see assessment section for further patient specific details.    If patient discharges prior to next session this note will serve as a discharge summary.  Please see below for the latest assessment towards goals.        Patient Diagnosis(es): The primary encounter diagnosis was Altered mental status, unspecified altered mental status type. Diagnoses of Acute cystitis without hematuria, Hypokalemia, Dehydration, Elevated LFTs, and History of alcohol use were also pertinent to this visit.  Past Medical History:  has no past medical history on file.  Past Surgical History:  has a past surgical history that includes Facial Surgery (Right, 10/14/2022).    Assessment  Body Structures, Functions, Activity Limitations Requiring Skilled Therapeutic Intervention: Decreased functional mobility ;Decreased ADL status;Decreased strength;Decreased endurance;Decreased cognition;Decreased safe awareness;Decreased balance  Assessment: Chavez Hyatt is a 64 y.o. male with no pmh who presents to the ED on 24 with AMS. Prior to admission, pt livign alone or with sister, independent with ADLs and ambulation without device (questionable historian). Pt continues to function below baseline. Recommend continued skilled therapy 3-5x/week.  Treatment  employment  Type of Occupation: [employer]  Leisure & Hobbies: golf and hang out with friends  Additional Comments: Questionable historian - ED notes report patient lives with sister.    Cognition   Orientation  Overall Orientation Status: Impaired  Orientation Level: Oriented to place;Oriented to person;Disoriented to situation  Cognition  Overall Cognitive Status: Exceptions  Arousal/Alertness: Appears intact  Following Commands: Appears intact  Attention Span: Appears intact  Memory: Decreased recall of recent events  Safety Judgement: Decreased awareness of need for safety  Problem Solving: Decreased awareness of errors  Insights: Decreased awareness of deficits  Initiation: Requires cues for some  Sequencing: Requires cues for some    Objective  Observation/Palpation  Observation: Incontinent of bowel  Gross Assessment  Strength: Generally decreased, functional    Bed mobility  Supine to Sit: Minimal assistance (HOB elevated)  Sit to Supine: Unable to assess  Bed Mobility Comments: Left in recliner at end of session  Transfers  Sit to Stand: Minimal Assistance  Stand to Sit: Minimal Assistance  Comment: Pt initially stood at EOB while dependent pericare was performed. Pt reporting significant pain during pericare.  Ambulation  Surface: Level tile  Device: Rolling Walker  Assistance: Minimal assistance;Moderate assistance  Gait Deviations: Shuffles  Distance: 15'  Comments: Pt with one instance of left knee buckling this date (typically it is right knee).     Balance  Posture: Fair  Sitting - Static: Fair  Sitting - Dynamic: Fair  Standing - Static: Fair (@RW)  Standing - Dynamic: Fair;- (@RW)         AM-PAC - Mobility  AM-PAC Basic Mobility - Inpatient   How much help is needed turning from your back to your side while in a flat bed without using bedrails?: A Little  How much help is needed moving from lying on your back to sitting on the side of a flat bed without using bedrails?: A Little  How much

## 2024-12-10 NOTE — PROGRESS NOTES
Pt awake and confused. During bedside report, pt is seen throwing his legs over the side rail stating, \"I'm getting up to stretch my legs\". Pt reoriented to place, time and situation and that he has fallen multiple times. Call light and needs in reach, bed alarm active.     Electronically signed by Christine Lopez RN on 12/9/2024 at 7:44 PM

## 2024-12-11 PROCEDURE — 6370000000 HC RX 637 (ALT 250 FOR IP): Performed by: HOSPITALIST

## 2024-12-11 PROCEDURE — 51798 US URINE CAPACITY MEASURE: CPT

## 2024-12-11 PROCEDURE — 51701 INSERT BLADDER CATHETER: CPT

## 2024-12-11 PROCEDURE — 6360000002 HC RX W HCPCS

## 2024-12-11 PROCEDURE — 6360000002 HC RX W HCPCS: Performed by: NURSE PRACTITIONER

## 2024-12-11 PROCEDURE — 2580000003 HC RX 258: Performed by: NURSE PRACTITIONER

## 2024-12-11 PROCEDURE — 97530 THERAPEUTIC ACTIVITIES: CPT

## 2024-12-11 PROCEDURE — 6370000000 HC RX 637 (ALT 250 FOR IP): Performed by: NURSE PRACTITIONER

## 2024-12-11 PROCEDURE — 6370000000 HC RX 637 (ALT 250 FOR IP)

## 2024-12-11 PROCEDURE — 2580000003 HC RX 258

## 2024-12-11 PROCEDURE — 6370000000 HC RX 637 (ALT 250 FOR IP): Performed by: STUDENT IN AN ORGANIZED HEALTH CARE EDUCATION/TRAINING PROGRAM

## 2024-12-11 PROCEDURE — 1200000000 HC SEMI PRIVATE

## 2024-12-11 PROCEDURE — 94760 N-INVAS EAR/PLS OXIMETRY 1: CPT

## 2024-12-11 RX ADMIN — ACETAMINOPHEN 650 MG: 325 TABLET ORAL at 03:29

## 2024-12-11 RX ADMIN — POTASSIUM CHLORIDE 40 MEQ: 20 TABLET, EXTENDED RELEASE ORAL at 10:22

## 2024-12-11 RX ADMIN — ENOXAPARIN SODIUM 40 MG: 100 INJECTION SUBCUTANEOUS at 08:35

## 2024-12-11 RX ADMIN — THIAMINE HYDROCHLORIDE 250 MG: 100 INJECTION, SOLUTION INTRAMUSCULAR; INTRAVENOUS at 10:20

## 2024-12-11 RX ADMIN — SODIUM CHLORIDE, PRESERVATIVE FREE 10 ML: 5 INJECTION INTRAVENOUS at 08:39

## 2024-12-11 RX ADMIN — SODIUM CHLORIDE, PRESERVATIVE FREE 10 ML: 5 INJECTION INTRAVENOUS at 21:39

## 2024-12-11 RX ADMIN — QUETIAPINE FUMARATE 25 MG: 25 TABLET ORAL at 21:39

## 2024-12-11 RX ADMIN — FOLIC ACID 1 MG: 1 TABLET ORAL at 08:36

## 2024-12-11 RX ADMIN — Medication 2000 UNITS: at 08:36

## 2024-12-11 RX ADMIN — SODIUM CHLORIDE 5 ML/HR: 9 INJECTION, SOLUTION INTRAVENOUS at 10:19

## 2024-12-11 RX ADMIN — Medication 6 MG: at 21:39

## 2024-12-11 RX ADMIN — TAMSULOSIN HYDROCHLORIDE 0.8 MG: 0.4 CAPSULE ORAL at 08:35

## 2024-12-11 RX ADMIN — ACETAMINOPHEN 650 MG: 325 TABLET ORAL at 21:38

## 2024-12-11 ASSESSMENT — PAIN SCALES - GENERAL
PAINLEVEL_OUTOF10: 3
PAINLEVEL_OUTOF10: 4

## 2024-12-11 NOTE — PROGRESS NOTES
Occupational Therapy  Facility/Department: [address] MED SURG  Occupational Therapy Daily Treatment Note    Name: Chavez Hyatt  : 1960  MRN: [id number]  Date of Service: 2024    Discharge Recommendations:  3-5 sessions per week, Patient would benefit from continued therapy after discharge  OT Equipment Recommendations  Other: defer to d/c facility    Chavez Hyatt scored a 15/24 on the AM-PAC ADL Inpatient form. Current research shows that an AM-PAC score of 17 or less is typically not associated with a discharge to the patient's home setting. Based on the patient's AM-PAC score and their current ADL deficits, it is recommended that the patient have 3-5 sessions per week of Occupational Therapy at d/c to increase the patient's independence.  Please see assessment section for further patient specific details.    If patient discharges prior to next session this note will serve as a discharge summary.  Please see below for the latest assessment towards goals.       Patient Diagnosis(es): The primary encounter diagnosis was Altered mental status, unspecified altered mental status type. Diagnoses of Acute cystitis without hematuria, Hypokalemia, Dehydration, Elevated LFTs, and History of alcohol use were also pertinent to this visit.  Past Medical History:  has no past medical history on file.  Past Surgical History:  has a past surgical history that includes Facial Surgery (Right, 10/14/2022).    Treatment Diagnosis: decreased fxl mobility/ADLs/cognition/balance    Assessment  Performance deficits / Impairments: Decreased functional mobility ;Decreased safe awareness;Decreased balance;Decreased ADL status;Decreased cognition;Decreased high-level IADLs;Decreased endurance;Decreased strength  Assessment: Pt seen in room, cotx with PT. Pt agreed to OT/PT and to get OOB. Reports no pain.  Moved to EOB supine>sit with Agustín.  Pt stood with min/mod A to RW and amb EOB to commode to recliner with min/modA, no LOB

## 2024-12-11 NOTE — CARE COORDINATION
Oneil w/ [facility]/ [facility] #[phone] - she confirms they will have bed available for patient at discharge .  Will need H[facility] and transport.  Electronically signed by Suad Villafana on 12/11/2024 at 12:38 PM  #[phone]

## 2024-12-11 NOTE — PROGRESS NOTES
Diagnosis: impaired mobility  Therapy Prognosis: Fair  Decision Making: High Complexity  History: see above  Exam: see below  Clinical Presentation: evolving  Barriers to Learning: cognition  Activity Tolerance  Activity Tolerance: Patient tolerated treatment well    Plan  Physical Therapy Plan  General Plan: 3-5 times per week  Current Treatment Recommendations: Strengthening, Balance training, Functional mobility training, Transfer training, Endurance training, Neuromuscular re-education, Gait training, Patient/Caregiver education & training, Safety education & training, Equipment evaluation, education, & procurement, Therapeutic activities  Safety Devices  Type of Devices: All fall risk precautions in place, Call light within reach, Patient at risk for falls, Nurse notified, Gait belt, Chair alarm in place, Left in chair    Restrictions  Restrictions/Precautions  Restrictions/Precautions: Fall Risk  Position Activity Restriction  Other Position/Activity Restrictions: Pt noted to have fallen twice on 12/5.     Subjective  General  Chart Reviewed: Yes  Patient assessed for rehabilitation services?: Yes  Additional Pertinent Hx: Chavez Hyatt is a 64 y.o. male with no pmh who presents to the ED on 12/2/24 with AMS.  Response To Previous Treatment: Patient with no complaints from previous session.  Family/Caregiver Present: No  Referring Practitioner: Kacy Daniel MD  Referral Date : 12/03/24  Diagnosis: AMS  Follows Commands: Within Functional Limits  Subjective  Subjective: Pt is agreeable to PT.         Social/Functional History  Social/Functional History  Lives With: Alone  Type of Home: Apartment  Home Layout: One level  Home Access: Level entry  Bathroom Shower/Tub: Tub/Shower unit  Bathroom Toilet: Standard  Prior Level of Assist for ADLs: Independent  Prior Level of Assist for Homemaking: Independent  Prior Level of Assist for Transfers: Independent  Active : Yes  Occupation: Full time  climbing 3-5 steps with a railing?: Total  AM-PAC Inpatient Mobility Raw Score : 13  AM-PAC Inpatient T-Scale Score : 36.74  Mobility Inpatient CMS 0-100% Score: 64.91  Mobility Inpatient CMS G-Code Modifier : CL      Goals  Short Term Goals  Time Frame for Short Term Goals: by acute discharge - all goals ongoing as of 12/11/24  Short Term Goal 1: bed mobility mod I  Short Term Goal 2: sit<>Stand SBA  Short Term Goal 3: ambulate > 50' with SBA  Patient Goals   Patient Goals : none stated       Education  Patient Education  Education Given To: Patient  Education Provided: Role of Therapy;Plan of Care  Education Method: Verbal  Barriers to Learning: Cognition  Education Outcome: Continued education needed      Therapy Time   Individual Concurrent Group Co-treatment   Time In 0835         Time Out 0900         Minutes 25         Timed Code Treatment Minutes: 25 Minutes       Christopher Grey, PT

## 2024-12-11 NOTE — PROGRESS NOTES
Hospitalist Progress Note      PCP: Sarahi Graham    Date of Admission: 12/2/2024    Chief Complaint: Altered mental status     Hospital Course: 64 year old male with a remote history of alcohol abuse who presented to the ED on 12/2 with concern for altered mental status. Per family report, patient has been getting progressively weaker over the last month, and over the past week, has become significantly more confused, falling, and minimally eating and drinking. Patient was dehydrated on ED arrival as evidenced by dry mucous membranes, tachycardia, and lactic acidosis.  Urinalysis was positive for nitrates and bilirubin and liver enzymes were elevated. CT imaging of the head and cervical spine, and x-ray of the chest were negative. Patient was treated with IV fluids and started on antibiotics and admitted for further evaluation and management. Urine culture was ultimately negative. Patient completed 3 days of antibiotics. Further workup including MRI brain and EEG were negative. Patient was noted to have multiple vitamin deficiencies including Vitamin D, thiamine and folate. Neurology was consulted and suspect a subacute worsening of cognition which may be related to his nutritional deficiencies and history of alcohol abuse. They recommended aggressive repletion of vitamins and outpatient follow up for further neurocognitive testing.     Subjective: Patient seen resting in recliner. He is calm. No sitter present in room. He remains confused, oriented to self and place, but unaware of date or reason he is in the hospital. Discussed plan for continued vitamin replacement and possibly discharge Friday. Patient verbalized understanding of plan. Discussed with RN continuing bladder scans to monitor for retention.    Assessment/Plan:    Acute metabolic encephalopathy   -CT head negative  -ETOH, CK, ammonia within normal range  -TSH elevated with mildly decreased T4, likely not significant enough to contribute to  symptoms  -Vitamin D, thiamine and folate low.  -MRI brain and EEG negative  -Neurology consulted 12/5. Suspect sbacute progressive enceohalopathy which my be related to history of alcohol use. Recommended aggressive repletion of vitamin deficiencies, as his falls may be related to impaired sensation rather than loss of strength.  -Continue vitamin D and folate supplement and thiamine as follows: IV thaimine 200mg TID x 3 days then IV thiamine 250mg daily x 4 days then after completing IV dosed thiamine continue on 100mg daily PO   -Will need further neurocognitive testing as outpatient     Generalized weakness   Recurrent falls   -Strength fair on exam, suspect a sensory issue contributing to falls.   -Replacing vitamin deficiencies as above.  -PT/OT consulted, recommend SNF    Acute cystitis without hematuria   -UA positive for nitrites. Treated empirically with Rocephin. Urine culture ultimately showing NGTD. Completed 3 days of IV antibiotics.    Urinary retention  -Monitored with bladder scan and PRN straight cath. Started on flomax.    Lactic acidosis   -Normalized with IV fluid resuscitation     Elevated LFTs  -Patient denied abdominal pain. LFTs improved with IV fluid resuscitation.     Hypokalemia   Hypomagnesemia  -Replace per K/Mg protocol    Abnormal thyroid function  -Will need repeat thyroid panel once acute issues resolved to determine need for thyroid replacement     Active Hospital Problems    Diagnosis     AMS (altered mental status) [R41.82]        Medications:  Reviewed    Infusion Medications    sodium chloride       Scheduled Medications    QUEtiapine  25 mg Oral Nightly    tamsulosin  0.8 mg Oral Daily    haloperidol lactate  5 mg IntraMUSCular Once    Vitamin D  2,000 Units Oral Daily    thiamine (B-1) 250 mg in sodium chloride 0.9 % 100 mL IVPB  250 mg IntraVENous Daily    folic acid  1 mg Oral Daily    nicotine  1 patch TransDERmal Daily    sodium chloride flush  5-40 mL IntraVENous 2 times

## 2024-12-11 NOTE — PROGRESS NOTES
Patient's sister Coty at bedside inquiring about care     Nurse was able to update sister who felt that she is not receiving adequate information about her brother    Nurse assured Coty and explained in details the treatment plan, interventions, outcomes and discharge planning's    Coty stated that she would like to receive a call from Attending MD/PA and PT/OT tomorrow. Information relayed to oncoming nurse and charge nurse made aware    Nurse offered to call House supervisor to speak with Coty. She declined and stated she will await the phones calls tomorrow    Electronically signed by Grisel Martinez RN on 12/11/2024 at 6:49 PM

## 2024-12-11 NOTE — PROGRESS NOTES
Patient bladder scanned per order. Bladder scan shows 469 ml. Per order patient is straight cathed at this time with sterile technique. Patient tolerated procedure well and is cooperative with care. Post bladder scan shows 0 ml. Urine is isaiah and malodorous at this time. Patient is repositioned at this time. Call light is in reach. Bed is locked, in lowest position and armed. Patient verbalizes no further needs at this time.     Electronically signed by Roxy Zamudio RN on 12/11/2024 at 4:16 AM

## 2024-12-11 NOTE — PLAN OF CARE
Problem: Discharge Planning  Goal: Discharge to home or other facility with appropriate resources  12/10/2024 2308 by Roxy Zamudio RN  Outcome: Progressing  12/10/2024 1141 by Jillian Rosales RN  Outcome: Progressing     Problem: Safety - Adult  Goal: Free from fall injury  12/10/2024 2308 by Roxy Zamudio RN  Outcome: Progressing  12/10/2024 1141 by Jillian Rosales RN  Outcome: Progressing     Problem: Skin/Tissue Integrity  Goal: Absence of new skin breakdown  Description: 1.  Monitor for areas of redness and/or skin breakdown  2.  Assess vascular access sites hourly  3.  Every 4-6 hours minimum:  Change oxygen saturation probe site  4.  Every 4-6 hours:  If on nasal continuous positive airway pressure, respiratory therapy assess nares and determine need for appliance change or resting period.  12/10/2024 2308 by Roxy Zamudio RN  Outcome: Progressing  12/10/2024 1141 by Jillian Rosales RN  Outcome: Progressing     Problem: Nutrition Deficit:  Goal: Optimize nutritional status  12/10/2024 2308 by Roxy Zamudio RN  Outcome: Progressing  12/10/2024 1141 by Jillian Rosales RN  Outcome: Progressing     Problem: Neurosensory - Adult  Goal: Achieves stable or improved neurological status  12/10/2024 2308 by Roxy Zamudio RN  Outcome: Progressing  12/10/2024 1141 by Jillian Rosales RN  Outcome: Progressing     Problem: Skin/Tissue Integrity - Adult  Goal: Skin integrity remains intact  12/10/2024 2308 by Roxy Zamudio RN  Outcome: Progressing  Flowsheets (Taken 12/10/2024 2307)  Skin Integrity Remains Intact:   Monitor for areas of redness and/or skin breakdown   Assess vascular access sites hourly  12/10/2024 1141 by Jillian Rosales RN  Outcome: Progressing  Goal: Incisions, wounds, or drain sites healing without S/S of infection  12/10/2024 2308 by Roxy Zamudio RN  Outcome: Progressing  12/10/2024 1141 by Jillian Rosales RN  Outcome: Progressing     Problem: Musculoskeletal -

## 2024-12-11 NOTE — PLAN OF CARE
Problem: Discharge Planning  Goal: Discharge to home or other facility with appropriate resources  Outcome: Progressing  Flowsheets (Taken 12/11/2024 0739)  Discharge to home or other facility with appropriate resources:   Identify barriers to discharge with patient and caregiver   Arrange for needed discharge resources and transportation as appropriate   Identify discharge learning needs (meds, wound care, etc)     Problem: Safety - Adult  Goal: Free from fall injury  Outcome: Progressing     Problem: Skin/Tissue Integrity  Goal: Absence of new skin breakdown  Description: 1.  Monitor for areas of redness and/or skin breakdown  Outcome: Progressing     Problem: Nutrition Deficit:  Goal: Optimize nutritional status  12/11/2024 1859 by Grisel Martinez, RN  Outcome: Progressing  Flowsheets (Taken 12/4/2024 0993)  Nutrient intake appropriate for improving, restoring, or maintaining nutritional needs:   Assess nutritional status and recommend course of action   Monitor oral intake, labs, and treatment plans   Recommend appropriate diets, oral nutritional supplements, and vitamin/mineral supplements   Order, calculate, and assess calorie counts as needed   Recommend, monitor, and adjust tube feedings and TPN/PPN based on assessed needs   Provide specific nutrition education to patient or family as appropriate     Problem: Neurosensory - Adult  Goal: Achieves stable or improved neurological status  Outcome: Progressing  Flowsheets (Taken 12/11/2024 0739)  Achieves stable or improved neurological status: Assess for and report changes in neurological status     Problem: Skin/Tissue Integrity - Adult  Goal: Skin integrity remains intact  Outcome: Progressing  Flowsheets (Taken 12/11/2024 0739)  Skin Integrity Remains Intact: Monitor for areas of redness and/or skin breakdown     Problem: Musculoskeletal - Adult  Goal: Return mobility to safest level of function  Outcome: Progressing  Flowsheets (Taken 12/11/2024

## 2024-12-11 NOTE — PROGRESS NOTES
Comprehensive Nutrition Assessment    Type and Reason for Visit:  Reassess    Nutrition Recommendations/Plan:   Continue current diet with magic cups BID.     Malnutrition Assessment:  Malnutrition Status:  Moderate malnutrition (12/06/24 1636)    Context:  Acute Illness     Findings of the 6 clinical characteristics of malnutrition:  Energy Intake:  75% or less of estimated energy requirements for 7 or more days  Weight Loss:  Greater than 5% over 1 month       Nutrition Assessment:    FU - PO continues to vary, mostly inadequate. Magic cup started, intakes TBD. Pt. has maintained weight stability since last RD assessment. No new recs at this time. Noted aggressive repleation of vitamin deficiencies. Pt. to complete IV thiamin prior to discharge. Pt. plans to transition to SNF at discharge pending neuro recs. Will continue to monitor nutritional adequacy and diet acceptance.    Nutrition Related Findings:    Na 133, K 3.4, BUN 5 Cr 0.5, Ca 8.2. LBM Wound Type: None       Current Nutrition Intake & Therapies:    Average Meal Intake: 1-25%, 26-50%, 51-75%  Average Supplements Intake: Unable to assess  ADULT DIET; Regular  ADULT ORAL NUTRITION SUPPLEMENT; Lunch, Dinner; Frozen Oral Supplement    Anthropometric Measures:  Height: 182.9 cm (6' 0.01\")  Ideal Body Weight (IBW): 178 lbs (81 kg)       Current Body Weight: 80.1 kg (176 lb 9.4 oz), 104.4 % IBW.    Current BMI (kg/m2): 23.9                             BMI Categories: Normal Weight (BMI 22.0 to 24.9) age over 65    Estimated Daily Nutrient Needs:  Energy Requirements Based On: Kcal/kg  Weight Used for Energy Requirements: Usual  Energy (kcal/day): 0946-3250 kcal (20-25 kcal/kg)  Weight Used for Protein Requirements: Usual  Protein (g/day):  g (1-1.2g/kg)  Method Used for Fluid Requirements: 1 ml/kcal  Fluid (ml/day): Or per provider    Nutrition Diagnosis:   Inadequate oral intake related to inadequate protein-energy intake as evidenced by intake 0-25%,

## 2024-12-12 VITALS
HEIGHT: 72 IN | SYSTOLIC BLOOD PRESSURE: 128 MMHG | BODY MASS INDEX: 26.01 KG/M2 | DIASTOLIC BLOOD PRESSURE: 88 MMHG | TEMPERATURE: 97.8 F | OXYGEN SATURATION: 96 % | HEART RATE: 109 BPM | WEIGHT: 192 LBS | RESPIRATION RATE: 17 BRPM

## 2024-12-12 LAB
ALBUMIN SERPL-MCNC: 3.2 G/DL (ref 3.4–5)
ALBUMIN/GLOB SERPL: 1.2 {RATIO} (ref 1.1–2.2)
ALP SERPL-CCNC: 120 U/L (ref 40–129)
ALT SERPL-CCNC: 27 U/L (ref 10–40)
ANION GAP SERPL CALCULATED.3IONS-SCNC: 11 MMOL/L (ref 3–16)
AST SERPL-CCNC: 44 U/L (ref 15–37)
BILIRUB SERPL-MCNC: 0.7 MG/DL (ref 0–1)
BUN SERPL-MCNC: 5 MG/DL (ref 7–20)
CALCIUM SERPL-MCNC: 8.9 MG/DL (ref 8.3–10.6)
CHLORIDE SERPL-SCNC: 105 MMOL/L (ref 99–110)
CO2 SERPL-SCNC: 23 MMOL/L (ref 21–32)
CREAT SERPL-MCNC: 0.6 MG/DL (ref 0.8–1.3)
GFR SERPLBLD CREATININE-BSD FMLA CKD-EPI: >90 ML/MIN/{1.73_M2}
GLUCOSE SERPL-MCNC: 102 MG/DL (ref 70–99)
POTASSIUM SERPL-SCNC: 3.9 MMOL/L (ref 3.5–5.1)
PROT SERPL-MCNC: 5.8 G/DL (ref 6.4–8.2)
SODIUM SERPL-SCNC: 139 MMOL/L (ref 136–145)

## 2024-12-12 PROCEDURE — 51701 INSERT BLADDER CATHETER: CPT

## 2024-12-12 PROCEDURE — 2580000003 HC RX 258: Performed by: NURSE PRACTITIONER

## 2024-12-12 PROCEDURE — 36415 COLL VENOUS BLD VENIPUNCTURE: CPT

## 2024-12-12 PROCEDURE — 6360000002 HC RX W HCPCS

## 2024-12-12 PROCEDURE — 51798 US URINE CAPACITY MEASURE: CPT

## 2024-12-12 PROCEDURE — 6370000000 HC RX 637 (ALT 250 FOR IP): Performed by: HOSPITALIST

## 2024-12-12 PROCEDURE — 80053 COMPREHEN METABOLIC PANEL: CPT

## 2024-12-12 PROCEDURE — 94760 N-INVAS EAR/PLS OXIMETRY 1: CPT

## 2024-12-12 PROCEDURE — 6370000000 HC RX 637 (ALT 250 FOR IP): Performed by: STUDENT IN AN ORGANIZED HEALTH CARE EDUCATION/TRAINING PROGRAM

## 2024-12-12 PROCEDURE — 6360000002 HC RX W HCPCS: Performed by: NURSE PRACTITIONER

## 2024-12-12 RX ORDER — THIAMINE MONONITRATE (VIT B1) 100 MG
100 TABLET ORAL DAILY
Qty: 30 TABLET | Refills: 3
Start: 2024-12-12

## 2024-12-12 RX ORDER — QUETIAPINE FUMARATE 25 MG/1
25 TABLET, FILM COATED ORAL NIGHTLY
Qty: 60 TABLET | Refills: 1
Start: 2024-12-12

## 2024-12-12 RX ADMIN — TAMSULOSIN HYDROCHLORIDE 0.8 MG: 0.4 CAPSULE ORAL at 08:19

## 2024-12-12 RX ADMIN — FOLIC ACID 1 MG: 1 TABLET ORAL at 08:19

## 2024-12-12 RX ADMIN — Medication 2000 UNITS: at 08:19

## 2024-12-12 RX ADMIN — ENOXAPARIN SODIUM 40 MG: 100 INJECTION SUBCUTANEOUS at 08:19

## 2024-12-12 RX ADMIN — THIAMINE HYDROCHLORIDE 250 MG: 100 INJECTION, SOLUTION INTRAMUSCULAR; INTRAVENOUS at 08:50

## 2024-12-12 NOTE — DISCHARGE SUMMARY
Hospital Medicine Discharge Summary    Patient ID: Chavez Hyatt      Patient's PCP: No, Pcp    Admit Date: 12/2/2024     Discharge Date:   12/12/24    Admitting Physician: Kacy Daniel MD     Discharging Provider: Cora Whyte PA-C       Hospital Course: 64 year old male with a remote history of alcohol abuse who presented to the ED on 12/2 with concern for altered mental status. Per family report, patient has been getting progressively weaker over the last month, and over the past week, has become significantly more confused, falling, and minimally eating and drinking. Patient was dehydrated on ED arrival as evidenced by dry mucous membranes, tachycardia, and lactic acidosis.  Urinalysis was positive for nitrates and bilirubin and liver enzymes were elevated. CT imaging of the head and cervical spine, and x-ray of the chest were negative. Patient was treated with IV fluids and started on antibiotics and admitted for further evaluation and management. Urine culture was ultimately negative. Patient completed 3 days of antibiotics. Further workup including MRI brain and EEG were negative. Patient was noted to have multiple vitamin deficiencies including Vitamin D, thiamine and folate. Neurology was consulted and suspect a subacute worsening of cognition which may be related to his nutritional deficiencies and history of alcohol abuse. They recommended aggressive repletion of vitamins and outpatient follow up for further neurocognitive testing. Patient completed recommended course of IV thiamine and was transitioned to oral thiamine in addition to oral vitamin D and folate. He was evaluated by PT/OT who recommended SNF placement. Case management facilitated placement at [facility]. Patient's sister, Coty, was updated on course of care and recommendations for further testing outpatient.       Acute metabolic encephalopathy   -CT head negative  -ETOH, CK, ammonia within normal range  -TSH  questions or concerns please feel free to contact me.    NOTE:  This report was transcribed using voice recognition software.  Every effort was made to ensure accuracy; however, inadvertent computerized transcription errors may be present.

## 2024-12-12 NOTE — PROGRESS NOTES
Pt has intermittent retention, when encouraged to try again, he urinates more.  Pt urinated 100ml today and bladder scan showed 447.  When encouraged to try to get more out pt got approximately 100 ml more.  When re scanned, showed 338.

## 2024-12-12 NOTE — CARE COORDINATION
DISCHARGE SUMMARY     DATE OF DISCHARGE: 12/12/2024    DISCHARGE DESTINATION: skilled unit      FACILITY:   Discharging to Facility/ Agency   Name: [facility]  Address:  [address] [place] [address]  Phone:  [phone]  Fax:  [phone]    INSURANCE PRECERT OBTAINED: n/a    ARAM/UMM COMPLETED: yes    TRANSPORTATION:     Company Name:  [facility]      Time: 430pm    Phone Number: [phone]      COMMENTS: Informed patient's sisters, Coty and Zoila regarding discharge today to [facility] and transport time of 430pm.   Informed Luis Carlos at [facility].   Rn aware. Report number [phone]    Electronically signed by CHELSEA Pisano, LISW, Case Management on 12/12/2024 at 12:17 PM  [place] [phone]

## 2024-12-12 NOTE — PROGRESS NOTES
Pt discharged to [facility].  Iv removed.  Report called to HT.  SHAILESH BRAN in packet for transport.  CMT here to transport pt.

## 2024-12-12 NOTE — PROGRESS NOTES
Physician Progress Note      PATIENT:               HEIDI MCKINNON  CSN #:                  [id number]  :                       1960  ADMIT DATE:       2024 6:08 PM  DISCH DATE:  RESPONDING  PROVIDER #:        Cora Whyte PA-C          QUERY TEXT:    Pt admitted with encephalopathy. Pt noted to have \"Acute cystitis without   hematuria\" by IM. If possible, please document in the progress notes and   discharge summary if you are evaluating and/or treating any of the following:    The medical record reflects the following:  Risk Factors: 64 year old male  Clinical Indicators:  IM : \"Acute cystitis without hematuria-UA positive for nitrites. Treated   empirically with Rocephin. Urine culture ultimately showing NGTD. Completed 3   days of IV antibiotics.\"    Treatment: IV Rocephin -, urinalysis with culture  Options provided:  -- Treated for Urinary Tract Infection despite negative culture  -- UTI was ruled out  -- Other - I will add my own diagnosis  -- Disagree - Not applicable / Not valid  -- Disagree - Clinically unable to determine / Unknown  -- Refer to Clinical Documentation Reviewer    PROVIDER RESPONSE TEXT:    This patient was treated for UTI despite negative culture.    Query created by: Roxy Lake on 2024 2:08 PM      Electronically signed by:  Cora Whyte PA-C 2024 2:12 PM

## 2024-12-12 NOTE — PLAN OF CARE
Problem: Discharge Planning  Goal: Discharge to home or other facility with appropriate resources  Outcome: Completed     Problem: Safety - Adult  Goal: Free from fall injury  Outcome: Completed     Problem: Skin/Tissue Integrity  Goal: Absence of new skin breakdown  Description: 1.  Monitor for areas of redness and/or skin breakdown  2.  Assess vascular access sites hourly  3.  Every 4-6 hours minimum:  Change oxygen saturation probe site  4.  Every 4-6 hours:  If on nasal continuous positive airway pressure, respiratory therapy assess nares and determine need for appliance change or resting period.  Outcome: Completed     Problem: Nutrition Deficit:  Goal: Optimize nutritional status  Outcome: Completed     Problem: Neurosensory - Adult  Goal: Achieves stable or improved neurological status  Outcome: Completed     Problem: Skin/Tissue Integrity - Adult  Goal: Skin integrity remains intact  Outcome: Completed  Goal: Incisions, wounds, or drain sites healing without S/S of infection  Outcome: Completed     Problem: Musculoskeletal - Adult  Goal: Return mobility to safest level of function  Outcome: Completed     Problem: Gastrointestinal - Adult  Goal: Minimal or absence of nausea and vomiting  Outcome: Completed  Goal: Maintains adequate nutritional intake  Outcome: Completed     Problem: Genitourinary - Adult  Goal: Absence of urinary retention  Outcome: Completed     Problem: Cardiovascular - Adult  Goal: Maintains optimal cardiac output and hemodynamic stability  Outcome: Completed  Goal: Absence of cardiac dysrhythmias or at baseline  Outcome: Completed     Problem: Anxiety  Goal: Will report anxiety at manageable levels  Description: INTERVENTIONS:  1. Administer medication as ordered  2. Teach and rehearse alternative coping skills  3. Provide emotional support with 1:1 interaction with staff  Outcome: Completed     Problem: Confusion  Goal: Confusion, delirium, dementia, or psychosis is improved or at  baseline  Description: INTERVENTIONS:  1. Assess for possible contributors to thought disturbance, including medications, impaired vision or hearing, underlying metabolic abnormalities, dehydration, psychiatric diagnoses, and notify attending LIP  2. [place] high risk fall precautions, as indicated  3. Provide frequent short contacts to provide reality reorientation, refocusing and direction  4. Decrease environmental stimuli, including noise as appropriate  5. Monitor and intervene to maintain adequate nutrition, hydration, elimination, sleep and activity  6. If unable to ensure safety without constant attention obtain sitter and review sitter guidelines with assigned personnel  7. Initiate Psychosocial CNS and Spiritual Care consult, as indicated  Outcome: Completed     Problem: Behavior  Goal: Pt/Family maintain appropriate behavior and adhere to behavioral management agreement, if implemented  Description: INTERVENTIONS:  1. Assess patient/family's coping skills and  non-compliant behavior (including use of illegal substances)  2. Notify security of behavior or suspected illegal substances which indicate the need for search of the family and/or belongings  3. Encourage verbalization of thoughts and concerns in a socially appropriate manner  4. Utilize positive, consistent limit setting strategies supporting safety of patient, staff and others  5. Encourage participation in the decision making process about the behavioral management agreement  6. If a visitor's behavior poses a threat to safety call refer to organization policy.  7. Initiate consult with , Psychosocial CNS, Spiritual Care as appropriate  Outcome: Completed

## 2024-12-12 NOTE — PLAN OF CARE
Problem: Discharge Planning  Goal: Discharge to home or other facility with appropriate resources  12/11/2024 2229 by Roxy Zamudio RN  Outcome: Progressing  12/11/2024 1859 by Grisel Martinez RN  Outcome: Progressing  Flowsheets (Taken 12/11/2024 0739)  Discharge to home or other facility with appropriate resources:   Identify barriers to discharge with patient and caregiver   Arrange for needed discharge resources and transportation as appropriate   Identify discharge learning needs (meds, wound care, etc)     Problem: Safety - Adult  Goal: Free from fall injury  12/11/2024 2229 by Roxy Zamudio RN  Outcome: Progressing  12/11/2024 1859 by Grisel Martinez RN  Outcome: Progressing  Flowsheets (Taken 12/11/2024 0739)  Free From Fall Injury: Instruct family/caregiver on patient safety     Problem: Skin/Tissue Integrity  Goal: Absence of new skin breakdown  Description: 1.  Monitor for areas of redness and/or skin breakdown  2.  Assess vascular access sites hourly  3.  Every 4-6 hours minimum:  Change oxygen saturation probe site  4.  Every 4-6 hours:  If on nasal continuous positive airway pressure, respiratory therapy assess nares and determine need for appliance change or resting period.  12/11/2024 2229 by Roxy Zamudio RN  Outcome: Progressing  12/11/2024 1859 by Grisel Martinez RN  Outcome: Progressing     Problem: Nutrition Deficit:  Goal: Optimize nutritional status  12/11/2024 2229 by Roxy Zamudio RN  Outcome: Progressing  12/11/2024 1859 by Grisel Martinez RN  Outcome: Progressing  12/11/2024 1129 by Claribel Erickson RD  Outcome: Progressing  Flowsheets (Taken 12/4/2024 0938)  Nutrient intake appropriate for improving, restoring, or maintaining nutritional needs:   Assess nutritional status and recommend course of action   Monitor oral intake, labs, and treatment plans   Recommend appropriate diets, oral nutritional supplements, and vitamin/mineral supplements   Order,  to behavioral management agreement, if implemented  Description: INTERVENTIONS:  1. Assess patient/family's coping skills and  non-compliant behavior (including use of illegal substances)  2. Notify security of behavior or suspected illegal substances which indicate the need for search of the family and/or belongings  3. Encourage verbalization of thoughts and concerns in a socially appropriate manner  4. Utilize positive, consistent limit setting strategies supporting safety of patient, staff and others  5. Encourage participation in the decision making process about the behavioral management agreement  6. If a visitor's behavior poses a threat to safety call refer to organization policy.  7. Initiate consult with , Psychosocial CNS, Spiritual Care as appropriate  12/11/2024 2229 by Roxy Zamudio, RN  Outcome: Progressing  12/11/2024 9619 by Grisel Martinez, RN  Outcome: Progressing  Flowsheets (Taken 12/11/2024 0194)  Patient/family maintains appropriate behavior and adheres to behavioral management agreement, if implemented: Assess patient/family’s coping skills and  non-compliant behavior (including use of illegal substances)

## 2024-12-19 NOTE — PROGRESS NOTES
Physician Progress Note      PATIENT:               HEIDI MCKINNON  CSN #:                  [id number]  :                       1960  ADMIT DATE:       2024 6:08 PM  DISCH DATE:        2024 5:00 PM  RESPONDING  PROVIDER #:        Cora Whyte PA-C          QUERY TEXT:    Pt admitted - with encephalopathy. Noted to be metabolic   encephalopathy. If possible, please document in the progress notes the   etiology of the encephalopathy:    The medical record reflects the following:  Risk Factors: 64 year old male PMH alcohol abuse  Clinical Indicators:  DC Summary: \"Acute metabolic encephalopathy. -ETOH, CK, ammonia within normal   range-TSH elevated with mildly decreased T4, likely not significant enough to   contribute to symptoms-MRI brain negative-EEG negative-Vitamin D, thiamine and   folate low.-Neurology consulted . Suspect subacute progressive   encephalopathy which my be related to history of alcohol use. Recommended   aggressive repletion of vitamin deficiencies  -Started on vitamin D and folate supplement and thiamine as follows: IV   thaimine 200mg TID x 3 days then IV thiamine 250mg daily x 4 days then after   completing IV dosed thiamine continue on 100mg daily PO -Patient had   waxing/waning mentation throughout remainder of admission. Needs further   neurocognitive testing as outpatient.\"  Neurology : \"Acute to subacute progressive encephalopathy. There is   suspicion for multiple underlying metabolic/vitamin deficiencies that could be   contributory. I wonder if UTI concerns are what caused his more rapid decline   though UC with NGTD. I do wonder if he has an underlying neuro cognitive   impairment that has been ongoing\"  RD : \"Moderate malnutrition (24 [id number]).\"    Treatment: IV antibiotics, aggressive repletion of vitamins, neurology   consult, MRI brain, CThead, EEG  Options provided:  -- Metabolic encephalopathy due to multiple underlying

## (undated) DEVICE — SUTURE VCRL SZ 4-0 L27IN ABSRB UD L17MM RB-1 1/2 CIR J214H

## (undated) DEVICE — SYRINGE,EAR/ULCER, 2 OZ, STERILE: Brand: MEDLINE

## (undated) DEVICE — GLOVE ORANGE PI 7 1/2   MSG9075

## (undated) DEVICE — SOLUTION IV IRRIG 250ML ST LF 0.9% SODIUM 2F7122

## (undated) DEVICE — STAPLER SKIN H3.9MM WIRE DIA0.58MM CRWN 6.9MM 35 STPL ROT

## (undated) DEVICE — INTENDED FOR TISSUE SEPARATION, AND OTHER PROCEDURES THAT REQUIRE A SHARP SURGICAL BLADE TO PUNCTURE OR CUT.: Brand: BARD-PARKER ® STAINLESS STEEL BLADES

## (undated) DEVICE — SKIN MARKER REGULAR TIP WITH RULER CAP AND LABELS: Brand: DEVON

## (undated) DEVICE — DRESSING EAR AD 5.5IN GLSCOCK

## (undated) DEVICE — ELECTRODE PT RET AD L9FT HI MOIST COND ADH HYDRGEL CORDED

## (undated) DEVICE — MERCY HEALTH WEST TURNOVER: Brand: MEDLINE INDUSTRIES, INC.

## (undated) DEVICE — PREMIUM DRY TRAY LF: Brand: MEDLINE INDUSTRIES, INC.

## (undated) DEVICE — SUTURE VCRL SZ 5-0 L18IN ABSRB UD L13MM P-3 3/8 CIR PRIM J493G

## (undated) DEVICE — SUTURE VCRL SZ 5-0 L18IN ABSRB UD P-3 L13MM 3/8 CIR PRIM J493H

## (undated) DEVICE — SUTURE VCRL + SZ 4-0 L18IN ABSRB UD P-3 3/8 CIR REV CUT NDL VCP494H

## (undated) DEVICE — SOLUTION PREP POVIDONE IOD FOR SKIN MUCOUS MEM PRIOR TO

## (undated) DEVICE — AIR/WATER SYRINGE PROTECTOR / 500 PER BO: Brand: PALMERO HEALTHCARE, LLC

## (undated) DEVICE — SOLUTION SCRB 4OZ 10% POVIDONE IOD ANTIMIC BTL

## (undated) DEVICE — SUTURE VCRL + SZ 3-0 L18IN ABSRB UD SH 1/2 CIR TAPERCUT NDL VCP864D

## (undated) DEVICE — ENT I-LF: Brand: MEDLINE INDUSTRIES, INC.

## (undated) DEVICE — TOWEL,OR,DSP,ST,BLUE,STD,4/PK,20PK/CS: Brand: MEDLINE